# Patient Record
Sex: FEMALE | Race: WHITE | NOT HISPANIC OR LATINO | Employment: FULL TIME | URBAN - METROPOLITAN AREA
[De-identification: names, ages, dates, MRNs, and addresses within clinical notes are randomized per-mention and may not be internally consistent; named-entity substitution may affect disease eponyms.]

---

## 2022-01-01 ENCOUNTER — ANESTHESIA EVENT (OUTPATIENT)
Dept: SURGERY | Facility: MEDICAL CENTER | Age: 67
DRG: 871 | End: 2022-01-01
Payer: COMMERCIAL

## 2022-01-01 ENCOUNTER — APPOINTMENT (OUTPATIENT)
Dept: ONCOLOGY | Facility: MEDICAL CENTER | Age: 67
End: 2022-01-01
Attending: STUDENT IN AN ORGANIZED HEALTH CARE EDUCATION/TRAINING PROGRAM
Payer: MEDICARE

## 2022-01-01 ENCOUNTER — OUTPATIENT INFUSION SERVICES (OUTPATIENT)
Dept: ONCOLOGY | Facility: MEDICAL CENTER | Age: 67
End: 2022-01-01
Attending: INTERNAL MEDICINE
Payer: COMMERCIAL

## 2022-01-01 ENCOUNTER — DOCUMENTATION (OUTPATIENT)
Dept: ONCOLOGY | Facility: MEDICAL CENTER | Age: 67
End: 2022-01-01
Payer: COMMERCIAL

## 2022-01-01 ENCOUNTER — TELEPHONE (OUTPATIENT)
Dept: HEMATOLOGY ONCOLOGY | Facility: MEDICAL CENTER | Age: 67
End: 2022-01-01
Payer: MEDICARE

## 2022-01-01 ENCOUNTER — OUTPATIENT INFUSION SERVICES (OUTPATIENT)
Dept: ONCOLOGY | Facility: MEDICAL CENTER | Age: 67
End: 2022-01-01
Attending: STUDENT IN AN ORGANIZED HEALTH CARE EDUCATION/TRAINING PROGRAM
Payer: COMMERCIAL

## 2022-01-01 ENCOUNTER — PATIENT OUTREACH (OUTPATIENT)
Dept: ONCOLOGY | Facility: MEDICAL CENTER | Age: 67
End: 2022-01-01
Payer: MEDICARE

## 2022-01-01 ENCOUNTER — PHARMACY VISIT (OUTPATIENT)
Dept: PHARMACY | Facility: MEDICAL CENTER | Age: 67
End: 2022-01-01
Payer: COMMERCIAL

## 2022-01-01 ENCOUNTER — HOSPITAL ENCOUNTER (OUTPATIENT)
Facility: MEDICAL CENTER | Age: 67
End: 2022-06-30
Attending: INTERNAL MEDICINE
Payer: COMMERCIAL

## 2022-01-01 ENCOUNTER — OUTPATIENT INFUSION SERVICES (OUTPATIENT)
Dept: ONCOLOGY | Facility: MEDICAL CENTER | Age: 67
End: 2022-01-01
Attending: INTERNAL MEDICINE
Payer: MEDICARE

## 2022-01-01 ENCOUNTER — HOSPITAL ENCOUNTER (OUTPATIENT)
Dept: RADIOLOGY | Facility: MEDICAL CENTER | Age: 67
End: 2022-06-29
Attending: SURGERY
Payer: COMMERCIAL

## 2022-01-01 ENCOUNTER — APPOINTMENT (OUTPATIENT)
Dept: HEMATOLOGY ONCOLOGY | Facility: MEDICAL CENTER | Age: 67
End: 2022-01-01
Payer: COMMERCIAL

## 2022-01-01 ENCOUNTER — PRE-ADMISSION TESTING (OUTPATIENT)
Dept: ADMISSIONS | Facility: MEDICAL CENTER | Age: 67
End: 2022-01-01
Attending: INTERNAL MEDICINE
Payer: COMMERCIAL

## 2022-01-01 ENCOUNTER — HOSPITAL ENCOUNTER (OUTPATIENT)
Dept: HEMATOLOGY ONCOLOGY | Facility: MEDICAL CENTER | Age: 67
End: 2022-07-15
Attending: INTERNAL MEDICINE
Payer: COMMERCIAL

## 2022-01-01 ENCOUNTER — APPOINTMENT (OUTPATIENT)
Dept: RADIOLOGY | Facility: MEDICAL CENTER | Age: 67
DRG: 871 | End: 2022-01-01
Attending: INTERNAL MEDICINE
Payer: COMMERCIAL

## 2022-01-01 ENCOUNTER — HOSPITAL ENCOUNTER (INPATIENT)
Facility: MEDICAL CENTER | Age: 67
LOS: 1 days | DRG: 424 | End: 2022-06-30
Attending: SURGERY | Admitting: SURGERY
Payer: COMMERCIAL

## 2022-01-01 ENCOUNTER — PATIENT OUTREACH (OUTPATIENT)
Dept: OTHER | Facility: MEDICAL CENTER | Age: 67
End: 2022-01-01
Payer: COMMERCIAL

## 2022-01-01 ENCOUNTER — OFFICE VISIT (OUTPATIENT)
Dept: HEMATOLOGY ONCOLOGY | Facility: MEDICAL CENTER | Age: 67
End: 2022-01-01
Payer: COMMERCIAL

## 2022-01-01 ENCOUNTER — HOSPITAL ENCOUNTER (OUTPATIENT)
Dept: RADIOLOGY | Facility: MEDICAL CENTER | Age: 67
End: 2022-10-14
Attending: STUDENT IN AN ORGANIZED HEALTH CARE EDUCATION/TRAINING PROGRAM
Payer: COMMERCIAL

## 2022-01-01 ENCOUNTER — HOSPITAL ENCOUNTER (OUTPATIENT)
Dept: RADIOLOGY | Facility: MEDICAL CENTER | Age: 67
End: 2022-06-14

## 2022-01-01 ENCOUNTER — HOSPITAL ENCOUNTER (OUTPATIENT)
Dept: HEMATOLOGY ONCOLOGY | Facility: MEDICAL CENTER | Age: 67
End: 2022-07-29
Attending: NURSE PRACTITIONER
Payer: COMMERCIAL

## 2022-01-01 ENCOUNTER — HOSPITAL ENCOUNTER (OUTPATIENT)
Dept: HEMATOLOGY ONCOLOGY | Facility: MEDICAL CENTER | Age: 67
End: 2022-08-26
Attending: STUDENT IN AN ORGANIZED HEALTH CARE EDUCATION/TRAINING PROGRAM
Payer: COMMERCIAL

## 2022-01-01 ENCOUNTER — APPOINTMENT (OUTPATIENT)
Dept: RADIOLOGY | Facility: MEDICAL CENTER | Age: 67
DRG: 871 | End: 2022-01-01
Attending: EMERGENCY MEDICINE
Payer: COMMERCIAL

## 2022-01-01 ENCOUNTER — TELEPHONE (OUTPATIENT)
Dept: HEMATOLOGY ONCOLOGY | Facility: MEDICAL CENTER | Age: 67
End: 2022-01-01
Payer: COMMERCIAL

## 2022-01-01 ENCOUNTER — HOSPITAL ENCOUNTER (OUTPATIENT)
Dept: HEMATOLOGY ONCOLOGY | Facility: MEDICAL CENTER | Age: 67
End: 2022-10-19
Attending: STUDENT IN AN ORGANIZED HEALTH CARE EDUCATION/TRAINING PROGRAM
Payer: COMMERCIAL

## 2022-01-01 ENCOUNTER — HOSPITAL ENCOUNTER (OUTPATIENT)
Facility: MEDICAL CENTER | Age: 67
End: 2022-05-20
Attending: INTERNAL MEDICINE | Admitting: INTERNAL MEDICINE
Payer: COMMERCIAL

## 2022-01-01 ENCOUNTER — ANESTHESIA EVENT (OUTPATIENT)
Dept: SURGERY | Facility: MEDICAL CENTER | Age: 67
End: 2022-01-01
Payer: COMMERCIAL

## 2022-01-01 ENCOUNTER — DOCUMENTATION (OUTPATIENT)
Dept: ONCOLOGY | Facility: MEDICAL CENTER | Age: 67
End: 2022-01-01

## 2022-01-01 ENCOUNTER — HOSPITAL ENCOUNTER (INPATIENT)
Facility: MEDICAL CENTER | Age: 67
LOS: 7 days | DRG: 871 | End: 2022-10-28
Attending: EMERGENCY MEDICINE | Admitting: INTERNAL MEDICINE
Payer: COMMERCIAL

## 2022-01-01 ENCOUNTER — TELEPHONE (OUTPATIENT)
Dept: SURGICAL ONCOLOGY | Facility: MEDICAL CENTER | Age: 67
End: 2022-01-01
Payer: COMMERCIAL

## 2022-01-01 ENCOUNTER — APPOINTMENT (OUTPATIENT)
Dept: RADIOLOGY | Facility: MEDICAL CENTER | Age: 67
DRG: 871 | End: 2022-01-01
Attending: NURSE PRACTITIONER
Payer: COMMERCIAL

## 2022-01-01 ENCOUNTER — ANESTHESIA (OUTPATIENT)
Dept: SURGERY | Facility: MEDICAL CENTER | Age: 67
DRG: 871 | End: 2022-01-01
Payer: COMMERCIAL

## 2022-01-01 ENCOUNTER — ANESTHESIA EVENT (OUTPATIENT)
Dept: SURGERY | Facility: MEDICAL CENTER | Age: 67
DRG: 424 | End: 2022-01-01
Payer: COMMERCIAL

## 2022-01-01 ENCOUNTER — APPOINTMENT (OUTPATIENT)
Dept: RADIOLOGY | Facility: MEDICAL CENTER | Age: 67
DRG: 871 | End: 2022-01-01
Attending: HOSPITALIST
Payer: COMMERCIAL

## 2022-01-01 ENCOUNTER — PRE-ADMISSION TESTING (OUTPATIENT)
Dept: ADMISSIONS | Facility: MEDICAL CENTER | Age: 67
DRG: 424 | End: 2022-01-01
Attending: SURGERY
Payer: COMMERCIAL

## 2022-01-01 ENCOUNTER — ANESTHESIA (OUTPATIENT)
Dept: SURGERY | Facility: MEDICAL CENTER | Age: 67
DRG: 424 | End: 2022-01-01
Payer: COMMERCIAL

## 2022-01-01 ENCOUNTER — APPOINTMENT (OUTPATIENT)
Dept: RADIOLOGY | Facility: MEDICAL CENTER | Age: 67
DRG: 424 | End: 2022-01-01
Attending: SURGERY
Payer: COMMERCIAL

## 2022-01-01 ENCOUNTER — OFFICE VISIT (OUTPATIENT)
Dept: SURGICAL ONCOLOGY | Facility: MEDICAL CENTER | Age: 67
End: 2022-01-01
Payer: COMMERCIAL

## 2022-01-01 ENCOUNTER — ANESTHESIA (OUTPATIENT)
Dept: SURGERY | Facility: MEDICAL CENTER | Age: 67
End: 2022-01-01
Payer: COMMERCIAL

## 2022-01-01 ENCOUNTER — PATIENT MESSAGE (OUTPATIENT)
Dept: HEALTH INFORMATION MANAGEMENT | Facility: OTHER | Age: 67
End: 2022-01-01

## 2022-01-01 ENCOUNTER — HOSPITAL ENCOUNTER (OUTPATIENT)
Dept: HEMATOLOGY ONCOLOGY | Facility: MEDICAL CENTER | Age: 67
End: 2022-08-12
Payer: COMMERCIAL

## 2022-01-01 ENCOUNTER — HOSPITAL ENCOUNTER (OUTPATIENT)
Dept: HEMATOLOGY ONCOLOGY | Facility: MEDICAL CENTER | Age: 67
End: 2022-09-23
Attending: NURSE PRACTITIONER
Payer: COMMERCIAL

## 2022-01-01 ENCOUNTER — HOSPITAL ENCOUNTER (OUTPATIENT)
Dept: HEMATOLOGY ONCOLOGY | Facility: MEDICAL CENTER | Age: 67
End: 2022-09-09
Attending: STUDENT IN AN ORGANIZED HEALTH CARE EDUCATION/TRAINING PROGRAM
Payer: COMMERCIAL

## 2022-01-01 VITALS
BODY MASS INDEX: 21.87 KG/M2 | DIASTOLIC BLOOD PRESSURE: 92 MMHG | WEIGHT: 108.47 LBS | TEMPERATURE: 96.7 F | HEIGHT: 59 IN | RESPIRATION RATE: 18 BRPM | HEART RATE: 102 BPM | OXYGEN SATURATION: 95 % | SYSTOLIC BLOOD PRESSURE: 132 MMHG

## 2022-01-01 VITALS
RESPIRATION RATE: 18 BRPM | BODY MASS INDEX: 20.18 KG/M2 | DIASTOLIC BLOOD PRESSURE: 95 MMHG | HEIGHT: 59 IN | SYSTOLIC BLOOD PRESSURE: 148 MMHG | TEMPERATURE: 97.1 F | HEART RATE: 106 BPM | OXYGEN SATURATION: 97 % | WEIGHT: 100.09 LBS

## 2022-01-01 VITALS
RESPIRATION RATE: 18 BRPM | WEIGHT: 102.29 LBS | SYSTOLIC BLOOD PRESSURE: 124 MMHG | BODY MASS INDEX: 20.62 KG/M2 | TEMPERATURE: 96.8 F | DIASTOLIC BLOOD PRESSURE: 89 MMHG | OXYGEN SATURATION: 97 % | HEART RATE: 101 BPM | HEIGHT: 59 IN

## 2022-01-01 VITALS
WEIGHT: 124.01 LBS | HEART RATE: 71 BPM | DIASTOLIC BLOOD PRESSURE: 103 MMHG | TEMPERATURE: 97.2 F | BODY MASS INDEX: 25 KG/M2 | RESPIRATION RATE: 18 BRPM | OXYGEN SATURATION: 93 % | SYSTOLIC BLOOD PRESSURE: 154 MMHG | HEIGHT: 59 IN

## 2022-01-01 VITALS
TEMPERATURE: 98.5 F | WEIGHT: 128.53 LBS | BODY MASS INDEX: 26.98 KG/M2 | HEART RATE: 87 BPM | HEIGHT: 58 IN | DIASTOLIC BLOOD PRESSURE: 38 MMHG | RESPIRATION RATE: 18 BRPM | SYSTOLIC BLOOD PRESSURE: 63 MMHG | OXYGEN SATURATION: 92 %

## 2022-01-01 VITALS
RESPIRATION RATE: 16 BRPM | WEIGHT: 98.11 LBS | TEMPERATURE: 96.9 F | HEIGHT: 58 IN | BODY MASS INDEX: 20.59 KG/M2 | OXYGEN SATURATION: 96 % | SYSTOLIC BLOOD PRESSURE: 122 MMHG | HEART RATE: 104 BPM | DIASTOLIC BLOOD PRESSURE: 84 MMHG

## 2022-01-01 VITALS
HEART RATE: 93 BPM | WEIGHT: 113.2 LBS | SYSTOLIC BLOOD PRESSURE: 118 MMHG | DIASTOLIC BLOOD PRESSURE: 76 MMHG | OXYGEN SATURATION: 97 % | TEMPERATURE: 97.3 F | RESPIRATION RATE: 14 BRPM | BODY MASS INDEX: 22.82 KG/M2 | HEIGHT: 59 IN

## 2022-01-01 VITALS
OXYGEN SATURATION: 96 % | TEMPERATURE: 97.4 F | HEART RATE: 107 BPM | SYSTOLIC BLOOD PRESSURE: 128 MMHG | RESPIRATION RATE: 15 BRPM | WEIGHT: 104.72 LBS | DIASTOLIC BLOOD PRESSURE: 96 MMHG | HEIGHT: 59 IN | BODY MASS INDEX: 21.11 KG/M2

## 2022-01-01 VITALS
HEART RATE: 80 BPM | DIASTOLIC BLOOD PRESSURE: 97 MMHG | OXYGEN SATURATION: 94 % | RESPIRATION RATE: 18 BRPM | SYSTOLIC BLOOD PRESSURE: 143 MMHG | TEMPERATURE: 98 F

## 2022-01-01 VITALS
SYSTOLIC BLOOD PRESSURE: 108 MMHG | BODY MASS INDEX: 20.68 KG/M2 | TEMPERATURE: 97.6 F | DIASTOLIC BLOOD PRESSURE: 62 MMHG | HEART RATE: 104 BPM | OXYGEN SATURATION: 92 % | WEIGHT: 102.6 LBS | RESPIRATION RATE: 16 BRPM | HEIGHT: 59 IN

## 2022-01-01 VITALS
RESPIRATION RATE: 18 BRPM | BODY MASS INDEX: 22.8 KG/M2 | DIASTOLIC BLOOD PRESSURE: 95 MMHG | OXYGEN SATURATION: 97 % | WEIGHT: 113.1 LBS | TEMPERATURE: 96.3 F | HEART RATE: 82 BPM | HEIGHT: 59 IN | SYSTOLIC BLOOD PRESSURE: 131 MMHG

## 2022-01-01 VITALS
OXYGEN SATURATION: 97 % | WEIGHT: 124 LBS | HEART RATE: 93 BPM | DIASTOLIC BLOOD PRESSURE: 80 MMHG | BODY MASS INDEX: 26.03 KG/M2 | HEIGHT: 58 IN | SYSTOLIC BLOOD PRESSURE: 118 MMHG | TEMPERATURE: 97.3 F

## 2022-01-01 VITALS
SYSTOLIC BLOOD PRESSURE: 133 MMHG | DIASTOLIC BLOOD PRESSURE: 89 MMHG | HEART RATE: 81 BPM | TEMPERATURE: 97 F | WEIGHT: 102.51 LBS | BODY MASS INDEX: 21.52 KG/M2 | RESPIRATION RATE: 18 BRPM | HEIGHT: 58 IN | OXYGEN SATURATION: 96 %

## 2022-01-01 VITALS
TEMPERATURE: 97.5 F | DIASTOLIC BLOOD PRESSURE: 66 MMHG | SYSTOLIC BLOOD PRESSURE: 110 MMHG | WEIGHT: 128.97 LBS | HEIGHT: 58 IN | BODY MASS INDEX: 27.07 KG/M2 | OXYGEN SATURATION: 95 % | HEART RATE: 95 BPM | RESPIRATION RATE: 16 BRPM

## 2022-01-01 VITALS
BODY MASS INDEX: 21.09 KG/M2 | SYSTOLIC BLOOD PRESSURE: 110 MMHG | WEIGHT: 104.6 LBS | OXYGEN SATURATION: 97 % | RESPIRATION RATE: 16 BRPM | HEART RATE: 100 BPM | TEMPERATURE: 96.6 F | DIASTOLIC BLOOD PRESSURE: 52 MMHG | HEIGHT: 59 IN

## 2022-01-01 VITALS
TEMPERATURE: 97 F | HEIGHT: 58 IN | OXYGEN SATURATION: 96 % | HEART RATE: 110 BPM | WEIGHT: 98.4 LBS | SYSTOLIC BLOOD PRESSURE: 98 MMHG | BODY MASS INDEX: 20.65 KG/M2 | DIASTOLIC BLOOD PRESSURE: 60 MMHG | RESPIRATION RATE: 14 BRPM

## 2022-01-01 VITALS
SYSTOLIC BLOOD PRESSURE: 139 MMHG | HEART RATE: 99 BPM | DIASTOLIC BLOOD PRESSURE: 87 MMHG | OXYGEN SATURATION: 95 % | TEMPERATURE: 97.1 F | BODY MASS INDEX: 23.78 KG/M2 | WEIGHT: 117.95 LBS | HEIGHT: 59 IN | RESPIRATION RATE: 18 BRPM

## 2022-01-01 VITALS
RESPIRATION RATE: 18 BRPM | SYSTOLIC BLOOD PRESSURE: 130 MMHG | HEART RATE: 112 BPM | OXYGEN SATURATION: 100 % | DIASTOLIC BLOOD PRESSURE: 94 MMHG | TEMPERATURE: 97.1 F

## 2022-01-01 VITALS
HEART RATE: 101 BPM | WEIGHT: 103 LBS | OXYGEN SATURATION: 96 % | TEMPERATURE: 97.4 F | DIASTOLIC BLOOD PRESSURE: 60 MMHG | RESPIRATION RATE: 14 BRPM | BODY MASS INDEX: 21.62 KG/M2 | HEIGHT: 58 IN | SYSTOLIC BLOOD PRESSURE: 112 MMHG

## 2022-01-01 VITALS
OXYGEN SATURATION: 98 % | RESPIRATION RATE: 18 BRPM | TEMPERATURE: 98 F | DIASTOLIC BLOOD PRESSURE: 92 MMHG | SYSTOLIC BLOOD PRESSURE: 125 MMHG | HEART RATE: 133 BPM

## 2022-01-01 VITALS
HEIGHT: 58 IN | SYSTOLIC BLOOD PRESSURE: 144 MMHG | BODY MASS INDEX: 25.59 KG/M2 | RESPIRATION RATE: 16 BRPM | DIASTOLIC BLOOD PRESSURE: 87 MMHG | HEART RATE: 68 BPM | WEIGHT: 121.91 LBS | OXYGEN SATURATION: 96 % | TEMPERATURE: 97.6 F

## 2022-01-01 VITALS
TEMPERATURE: 97.6 F | SYSTOLIC BLOOD PRESSURE: 141 MMHG | HEART RATE: 109 BPM | BODY MASS INDEX: 23.78 KG/M2 | OXYGEN SATURATION: 98 % | RESPIRATION RATE: 18 BRPM | WEIGHT: 117.95 LBS | DIASTOLIC BLOOD PRESSURE: 108 MMHG

## 2022-01-01 VITALS
BODY MASS INDEX: 21.89 KG/M2 | RESPIRATION RATE: 16 BRPM | SYSTOLIC BLOOD PRESSURE: 122 MMHG | TEMPERATURE: 96.9 F | HEART RATE: 96 BPM | DIASTOLIC BLOOD PRESSURE: 82 MMHG | HEIGHT: 59 IN | OXYGEN SATURATION: 97 % | WEIGHT: 108.6 LBS

## 2022-01-01 DIAGNOSIS — C25.9 MALIGNANT NEOPLASM DETERMINED BY BIOPSY OF PANCREAS (HCC): ICD-10-CM

## 2022-01-01 DIAGNOSIS — C25.9 PANCREATIC ADENOCARCINOMA (HCC): ICD-10-CM

## 2022-01-01 DIAGNOSIS — C25.8 OVERLAPPING MALIGNANT NEOPLASM OF PANCREAS (HCC): ICD-10-CM

## 2022-01-01 DIAGNOSIS — R93.5 ABDOMINAL ULTRASOUND, ABNORMAL: ICD-10-CM

## 2022-01-01 DIAGNOSIS — Z79.899 ENCOUNTER FOR LONG-TERM CURRENT USE OF HIGH RISK MEDICATION: ICD-10-CM

## 2022-01-01 DIAGNOSIS — C25.9 PANCREATIC CARCINOMA (HCC): ICD-10-CM

## 2022-01-01 DIAGNOSIS — R11.2 CHEMOTHERAPY INDUCED NAUSEA AND VOMITING: ICD-10-CM

## 2022-01-01 DIAGNOSIS — R63.4 WEIGHT LOSS: ICD-10-CM

## 2022-01-01 DIAGNOSIS — Z01.810 PRE-OPERATIVE CARDIOVASCULAR EXAMINATION: ICD-10-CM

## 2022-01-01 DIAGNOSIS — Z95.828 PORT-A-CATH IN PLACE: ICD-10-CM

## 2022-01-01 DIAGNOSIS — C78.7 LIVER METASTASES: ICD-10-CM

## 2022-01-01 DIAGNOSIS — R10.10 PAIN OF UPPER ABDOMEN: ICD-10-CM

## 2022-01-01 DIAGNOSIS — C25.9 PANCREATIC CARCINOMA (HCC): Primary | ICD-10-CM

## 2022-01-01 DIAGNOSIS — Z85.89 HISTORY OF KNOWN METASTASIS TO LIVER: ICD-10-CM

## 2022-01-01 DIAGNOSIS — K52.1 CHEMOTHERAPY INDUCED DIARRHEA: ICD-10-CM

## 2022-01-01 DIAGNOSIS — Z01.812 PRE-OPERATIVE LABORATORY EXAMINATION: ICD-10-CM

## 2022-01-01 DIAGNOSIS — T45.1X5A CHEMOTHERAPY INDUCED DIARRHEA: ICD-10-CM

## 2022-01-01 DIAGNOSIS — T45.1X5A CHEMOTHERAPY INDUCED NAUSEA AND VOMITING: ICD-10-CM

## 2022-01-01 DIAGNOSIS — K83.1 BILIARY OBSTRUCTION: ICD-10-CM

## 2022-01-01 LAB
ABO + RH BLD: NORMAL
ABO GROUP BLD: NORMAL
ALBUMIN SERPL BCP-MCNC: 2 G/DL (ref 3.2–4.9)
ALBUMIN SERPL BCP-MCNC: 2.1 G/DL (ref 3.2–4.9)
ALBUMIN SERPL BCP-MCNC: 2.3 G/DL (ref 3.2–4.9)
ALBUMIN SERPL BCP-MCNC: 2.5 G/DL (ref 3.2–4.9)
ALBUMIN SERPL BCP-MCNC: 2.6 G/DL (ref 3.2–4.9)
ALBUMIN SERPL BCP-MCNC: 2.9 G/DL (ref 3.2–4.9)
ALBUMIN SERPL BCP-MCNC: 3 G/DL (ref 3.2–4.9)
ALBUMIN SERPL BCP-MCNC: 3.6 G/DL (ref 3.2–4.9)
ALBUMIN SERPL BCP-MCNC: 3.8 G/DL (ref 3.2–4.9)
ALBUMIN SERPL BCP-MCNC: 3.9 G/DL (ref 3.2–4.9)
ALBUMIN SERPL BCP-MCNC: 4.3 G/DL (ref 3.2–4.9)
ALBUMIN SERPL BCP-MCNC: 4.4 G/DL (ref 3.2–4.9)
ALBUMIN/GLOB SERPL: 0.6 G/DL
ALBUMIN/GLOB SERPL: 0.7 G/DL
ALBUMIN/GLOB SERPL: 0.8 G/DL
ALBUMIN/GLOB SERPL: 0.9 G/DL
ALBUMIN/GLOB SERPL: 0.9 G/DL
ALBUMIN/GLOB SERPL: 1 G/DL
ALBUMIN/GLOB SERPL: 1.2 G/DL
ALBUMIN/GLOB SERPL: 1.2 G/DL
ALBUMIN/GLOB SERPL: 1.3 G/DL
ALP SERPL-CCNC: 137 U/L (ref 30–99)
ALP SERPL-CCNC: 209 U/L (ref 30–99)
ALP SERPL-CCNC: 306 U/L (ref 30–99)
ALP SERPL-CCNC: 420 U/L (ref 30–99)
ALP SERPL-CCNC: 475 U/L (ref 30–99)
ALP SERPL-CCNC: 477 U/L (ref 30–99)
ALP SERPL-CCNC: 521 U/L (ref 30–99)
ALP SERPL-CCNC: 600 U/L (ref 30–99)
ALP SERPL-CCNC: 623 U/L (ref 30–99)
ALP SERPL-CCNC: 677 U/L (ref 30–99)
ALP SERPL-CCNC: 696 U/L (ref 30–99)
ALP SERPL-CCNC: 698 U/L (ref 30–99)
ALP SERPL-CCNC: 764 U/L (ref 30–99)
ALP SERPL-CCNC: 812 U/L (ref 30–99)
ALT SERPL-CCNC: 30 U/L (ref 2–50)
ALT SERPL-CCNC: 36 U/L (ref 2–50)
ALT SERPL-CCNC: 44 U/L (ref 2–50)
ALT SERPL-CCNC: 47 U/L (ref 2–50)
ALT SERPL-CCNC: 47 U/L (ref 2–50)
ALT SERPL-CCNC: 49 U/L (ref 2–50)
ALT SERPL-CCNC: 51 U/L (ref 2–50)
ALT SERPL-CCNC: 53 U/L (ref 2–50)
ALT SERPL-CCNC: 54 U/L (ref 2–50)
ALT SERPL-CCNC: 55 U/L (ref 2–50)
ALT SERPL-CCNC: 56 U/L (ref 2–50)
ALT SERPL-CCNC: 58 U/L (ref 2–50)
ALT SERPL-CCNC: 59 U/L (ref 2–50)
ALT SERPL-CCNC: 62 U/L (ref 2–50)
ANION GAP SERPL CALC-SCNC: 13 MMOL/L (ref 7–16)
ANION GAP SERPL CALC-SCNC: 13 MMOL/L (ref 7–16)
ANION GAP SERPL CALC-SCNC: 16 MMOL/L (ref 7–16)
ANION GAP SERPL CALC-SCNC: 17 MMOL/L (ref 7–16)
ANION GAP SERPL CALC-SCNC: 18 MMOL/L (ref 7–16)
ANION GAP SERPL CALC-SCNC: 18 MMOL/L (ref 7–16)
ANION GAP SERPL CALC-SCNC: 19 MMOL/L (ref 7–16)
ANISOCYTOSIS BLD QL SMEAR: ABNORMAL
ANISOCYTOSIS BLD QL SMEAR: ABNORMAL
AST SERPL-CCNC: 100 U/L (ref 12–45)
AST SERPL-CCNC: 122 U/L (ref 12–45)
AST SERPL-CCNC: 124 U/L (ref 12–45)
AST SERPL-CCNC: 142 U/L (ref 12–45)
AST SERPL-CCNC: 145 U/L (ref 12–45)
AST SERPL-CCNC: 150 U/L (ref 12–45)
AST SERPL-CCNC: 155 U/L (ref 12–45)
AST SERPL-CCNC: 167 U/L (ref 12–45)
AST SERPL-CCNC: 43 U/L (ref 12–45)
AST SERPL-CCNC: 47 U/L (ref 12–45)
AST SERPL-CCNC: 72 U/L (ref 12–45)
AST SERPL-CCNC: 78 U/L (ref 12–45)
AST SERPL-CCNC: 81 U/L (ref 12–45)
AST SERPL-CCNC: 85 U/L (ref 12–45)
BACTERIA BLD CULT: NORMAL
BACTERIA BLD CULT: NORMAL
BASOPHILS # BLD AUTO: 0 % (ref 0–1.8)
BASOPHILS # BLD AUTO: 0.2 % (ref 0–1.8)
BASOPHILS # BLD AUTO: 0.2 % (ref 0–1.8)
BASOPHILS # BLD AUTO: 0.3 % (ref 0–1.8)
BASOPHILS # BLD AUTO: 0.3 % (ref 0–1.8)
BASOPHILS # BLD AUTO: 0.5 % (ref 0–1.8)
BASOPHILS # BLD AUTO: 0.5 % (ref 0–1.8)
BASOPHILS # BLD AUTO: 0.6 % (ref 0–1.8)
BASOPHILS # BLD AUTO: 0.6 % (ref 0–1.8)
BASOPHILS # BLD AUTO: 0.9 % (ref 0–1.8)
BASOPHILS # BLD: 0 K/UL (ref 0–0.12)
BASOPHILS # BLD: 0.01 K/UL (ref 0–0.12)
BASOPHILS # BLD: 0.04 K/UL (ref 0–0.12)
BASOPHILS # BLD: 0.05 K/UL (ref 0–0.12)
BASOPHILS # BLD: 0.06 K/UL (ref 0–0.12)
BASOPHILS # BLD: 0.07 K/UL (ref 0–0.12)
BASOPHILS # BLD: 0.07 K/UL (ref 0–0.12)
BASOPHILS # BLD: 0.08 K/UL (ref 0–0.12)
BASOPHILS # BLD: 0.1 K/UL (ref 0–0.12)
BASOPHILS # BLD: 0.15 K/UL (ref 0–0.12)
BILIRUB CONJ SERPL-MCNC: 7.9 MG/DL (ref 0.1–0.5)
BILIRUB INDIRECT SERPL-MCNC: 0.9 MG/DL (ref 0–1)
BILIRUB SERPL-MCNC: 0.7 MG/DL (ref 0.1–1.5)
BILIRUB SERPL-MCNC: 0.7 MG/DL (ref 0.1–1.5)
BILIRUB SERPL-MCNC: 0.8 MG/DL (ref 0.1–1.5)
BILIRUB SERPL-MCNC: 0.8 MG/DL (ref 0.1–1.5)
BILIRUB SERPL-MCNC: 0.9 MG/DL (ref 0.1–1.5)
BILIRUB SERPL-MCNC: 1.2 MG/DL (ref 0.1–1.5)
BILIRUB SERPL-MCNC: 1.5 MG/DL (ref 0.1–1.5)
BILIRUB SERPL-MCNC: 10 MG/DL (ref 0.1–1.5)
BILIRUB SERPL-MCNC: 10.1 MG/DL (ref 0.1–1.5)
BILIRUB SERPL-MCNC: 10.2 MG/DL (ref 0.1–1.5)
BILIRUB SERPL-MCNC: 8 MG/DL (ref 0.1–1.5)
BILIRUB SERPL-MCNC: 8.7 MG/DL (ref 0.1–1.5)
BILIRUB SERPL-MCNC: 8.8 MG/DL (ref 0.1–1.5)
BILIRUB SERPL-MCNC: 9.1 MG/DL (ref 0.1–1.5)
BLD GP AB SCN SERPL QL: NORMAL
BUN SERPL-MCNC: 10 MG/DL (ref 8–22)
BUN SERPL-MCNC: 14 MG/DL (ref 8–22)
BUN SERPL-MCNC: 16 MG/DL (ref 8–22)
BUN SERPL-MCNC: 18 MG/DL (ref 8–22)
BUN SERPL-MCNC: 18 MG/DL (ref 8–22)
BUN SERPL-MCNC: 19 MG/DL (ref 8–22)
BUN SERPL-MCNC: 22 MG/DL (ref 8–22)
BUN SERPL-MCNC: 24 MG/DL (ref 8–22)
BUN SERPL-MCNC: 31 MG/DL (ref 8–22)
BUN SERPL-MCNC: 37 MG/DL (ref 8–22)
BUN SERPL-MCNC: 7 MG/DL (ref 8–22)
BUN SERPL-MCNC: 9 MG/DL (ref 8–22)
BURR CELLS BLD QL SMEAR: NORMAL
CALCIUM SERPL-MCNC: 8 MG/DL (ref 8.4–10.2)
CALCIUM SERPL-MCNC: 8.2 MG/DL (ref 8.4–10.2)
CALCIUM SERPL-MCNC: 8.5 MG/DL (ref 8.4–10.2)
CALCIUM SERPL-MCNC: 8.8 MG/DL (ref 8.4–10.2)
CALCIUM SERPL-MCNC: 8.8 MG/DL (ref 8.5–10.5)
CALCIUM SERPL-MCNC: 8.8 MG/DL (ref 8.5–10.5)
CALCIUM SERPL-MCNC: 9 MG/DL (ref 8.5–10.5)
CALCIUM SERPL-MCNC: 9.3 MG/DL (ref 8.5–10.5)
CALCIUM SERPL-MCNC: 9.6 MG/DL (ref 8.5–10.5)
CALCIUM SERPL-MCNC: 9.6 MG/DL (ref 8.5–10.5)
CALCIUM SERPL-MCNC: 9.7 MG/DL (ref 8.5–10.5)
CALCIUM SERPL-MCNC: 9.9 MG/DL (ref 8.5–10.5)
CANCER AG19-9 SERPL-ACNC: 228 U/ML (ref 0–35)
CANCER AG19-9 SERPL-ACNC: 284 U/ML (ref 0–35)
CANCER AG19-9 SERPL-ACNC: 312 U/ML (ref 0–35)
CANCER AG19-9 SERPL-ACNC: 418 U/ML (ref 0–35)
CANCER AG19-9 SERPL-ACNC: 476 U/ML (ref 0–35)
CANCER AG19-9 SERPL-ACNC: 513 U/ML (ref 0–35)
CANCER AG19-9 SERPL-ACNC: 575 U/ML (ref 0–35)
CEA SERPL-MCNC: 2172 NG/ML (ref 0–3)
CEA SERPL-MCNC: 3117 NG/ML (ref 0–3)
CEA SERPL-MCNC: 3641 NG/ML (ref 0–3)
CEA SERPL-MCNC: 3821 NG/ML (ref 0–3)
CEA SERPL-MCNC: 4190 NG/ML (ref 0–3)
CEA SERPL-MCNC: 5576 NG/ML (ref 0–3)
CHLORIDE SERPL-SCNC: 100 MMOL/L (ref 96–112)
CHLORIDE SERPL-SCNC: 87 MMOL/L (ref 96–112)
CHLORIDE SERPL-SCNC: 89 MMOL/L (ref 96–112)
CHLORIDE SERPL-SCNC: 89 MMOL/L (ref 96–112)
CHLORIDE SERPL-SCNC: 93 MMOL/L (ref 96–112)
CHLORIDE SERPL-SCNC: 93 MMOL/L (ref 96–112)
CHLORIDE SERPL-SCNC: 95 MMOL/L (ref 96–112)
CHLORIDE SERPL-SCNC: 96 MMOL/L (ref 96–112)
CHLORIDE SERPL-SCNC: 97 MMOL/L (ref 96–112)
CHLORIDE SERPL-SCNC: 97 MMOL/L (ref 96–112)
CHLORIDE SERPL-SCNC: 98 MMOL/L (ref 96–112)
CHLORIDE SERPL-SCNC: 99 MMOL/L (ref 96–112)
CO2 SERPL-SCNC: 13 MMOL/L (ref 20–33)
CO2 SERPL-SCNC: 14 MMOL/L (ref 20–33)
CO2 SERPL-SCNC: 15 MMOL/L (ref 20–33)
CO2 SERPL-SCNC: 16 MMOL/L (ref 20–33)
CO2 SERPL-SCNC: 20 MMOL/L (ref 20–33)
CO2 SERPL-SCNC: 21 MMOL/L (ref 20–33)
CO2 SERPL-SCNC: 22 MMOL/L (ref 20–33)
CO2 SERPL-SCNC: 22 MMOL/L (ref 20–33)
CO2 SERPL-SCNC: 24 MMOL/L (ref 20–33)
CO2 SERPL-SCNC: 25 MMOL/L (ref 20–33)
CO2 SERPL-SCNC: 25 MMOL/L (ref 20–33)
CO2 SERPL-SCNC: 26 MMOL/L (ref 20–33)
COMMENT 1642: NORMAL
CREAT SERPL-MCNC: 0.39 MG/DL (ref 0.5–1.4)
CREAT SERPL-MCNC: 0.47 MG/DL (ref 0.5–1.4)
CREAT SERPL-MCNC: 0.49 MG/DL (ref 0.5–1.4)
CREAT SERPL-MCNC: 0.51 MG/DL (ref 0.5–1.4)
CREAT SERPL-MCNC: 0.53 MG/DL (ref 0.5–1.4)
CREAT SERPL-MCNC: 0.53 MG/DL (ref 0.5–1.4)
CREAT SERPL-MCNC: 0.54 MG/DL (ref 0.5–1.4)
CREAT SERPL-MCNC: 0.6 MG/DL (ref 0.5–1.4)
CREAT SERPL-MCNC: 0.64 MG/DL (ref 0.5–1.4)
CREAT SERPL-MCNC: 0.64 MG/DL (ref 0.5–1.4)
CREAT SERPL-MCNC: 0.68 MG/DL (ref 0.5–1.4)
CREAT SERPL-MCNC: 0.76 MG/DL (ref 0.5–1.4)
CREAT SERPL-MCNC: 0.95 MG/DL (ref 0.5–1.4)
CREAT SERPL-MCNC: 1.19 MG/DL (ref 0.5–1.4)
EKG IMPRESSION: NORMAL
EOSINOPHIL # BLD AUTO: 0 K/UL (ref 0–0.51)
EOSINOPHIL # BLD AUTO: 0 K/UL (ref 0–0.51)
EOSINOPHIL # BLD AUTO: 0.01 K/UL (ref 0–0.51)
EOSINOPHIL # BLD AUTO: 0.04 K/UL (ref 0–0.51)
EOSINOPHIL # BLD AUTO: 0.11 K/UL (ref 0–0.51)
EOSINOPHIL # BLD AUTO: 0.13 K/UL (ref 0–0.51)
EOSINOPHIL # BLD AUTO: 0.16 K/UL (ref 0–0.51)
EOSINOPHIL # BLD AUTO: 0.19 K/UL (ref 0–0.51)
EOSINOPHIL # BLD AUTO: 0.25 K/UL (ref 0–0.51)
EOSINOPHIL # BLD AUTO: 0.27 K/UL (ref 0–0.51)
EOSINOPHIL NFR BLD: 0 % (ref 0–6.9)
EOSINOPHIL NFR BLD: 0 % (ref 0–6.9)
EOSINOPHIL NFR BLD: 0.2 % (ref 0–6.9)
EOSINOPHIL NFR BLD: 0.4 % (ref 0–6.9)
EOSINOPHIL NFR BLD: 0.5 % (ref 0–6.9)
EOSINOPHIL NFR BLD: 0.6 % (ref 0–6.9)
EOSINOPHIL NFR BLD: 0.8 % (ref 0–6.9)
EOSINOPHIL NFR BLD: 1 % (ref 0–6.9)
EOSINOPHIL NFR BLD: 1.5 % (ref 0–6.9)
EOSINOPHIL NFR BLD: 3 % (ref 0–6.9)
ERYTHROCYTE [DISTWIDTH] IN BLOOD BY AUTOMATED COUNT: 47.5 FL (ref 35.9–50)
ERYTHROCYTE [DISTWIDTH] IN BLOOD BY AUTOMATED COUNT: 47.7 FL (ref 35.9–50)
ERYTHROCYTE [DISTWIDTH] IN BLOOD BY AUTOMATED COUNT: 51.8 FL (ref 35.9–50)
ERYTHROCYTE [DISTWIDTH] IN BLOOD BY AUTOMATED COUNT: 57.4 FL (ref 35.9–50)
ERYTHROCYTE [DISTWIDTH] IN BLOOD BY AUTOMATED COUNT: 59 FL (ref 35.9–50)
ERYTHROCYTE [DISTWIDTH] IN BLOOD BY AUTOMATED COUNT: 61.2 FL (ref 35.9–50)
ERYTHROCYTE [DISTWIDTH] IN BLOOD BY AUTOMATED COUNT: 69.4 FL (ref 35.9–50)
ERYTHROCYTE [DISTWIDTH] IN BLOOD BY AUTOMATED COUNT: 86.6 FL (ref 35.9–50)
ERYTHROCYTE [DISTWIDTH] IN BLOOD BY AUTOMATED COUNT: 87.7 FL (ref 35.9–50)
ERYTHROCYTE [DISTWIDTH] IN BLOOD BY AUTOMATED COUNT: 92.8 FL (ref 35.9–50)
ERYTHROCYTE [DISTWIDTH] IN BLOOD BY AUTOMATED COUNT: 93.9 FL (ref 35.9–50)
ERYTHROCYTE [DISTWIDTH] IN BLOOD BY AUTOMATED COUNT: 94 FL (ref 35.9–50)
ERYTHROCYTE [DISTWIDTH] IN BLOOD BY AUTOMATED COUNT: 94.4 FL (ref 35.9–50)
ERYTHROCYTE [DISTWIDTH] IN BLOOD BY AUTOMATED COUNT: 94.6 FL (ref 35.9–50)
GFR SERPLBLD CREATININE-BSD FMLA CKD-EPI: 101 ML/MIN/1.73 M 2
GFR SERPLBLD CREATININE-BSD FMLA CKD-EPI: 102 ML/MIN/1.73 M 2
GFR SERPLBLD CREATININE-BSD FMLA CKD-EPI: 103 ML/MIN/1.73 M 2
GFR SERPLBLD CREATININE-BSD FMLA CKD-EPI: 104 ML/MIN/1.73 M 2
GFR SERPLBLD CREATININE-BSD FMLA CKD-EPI: 109 ML/MIN/1.73 M 2
GFR SERPLBLD CREATININE-BSD FMLA CKD-EPI: 50 ML/MIN/1.73 M 2
GFR SERPLBLD CREATININE-BSD FMLA CKD-EPI: 66 ML/MIN/1.73 M 2
GFR SERPLBLD CREATININE-BSD FMLA CKD-EPI: 86 ML/MIN/1.73 M 2
GFR SERPLBLD CREATININE-BSD FMLA CKD-EPI: 96 ML/MIN/1.73 M 2
GFR SERPLBLD CREATININE-BSD FMLA CKD-EPI: 97 ML/MIN/1.73 M 2
GFR SERPLBLD CREATININE-BSD FMLA CKD-EPI: 97 ML/MIN/1.73 M 2
GFR SERPLBLD CREATININE-BSD FMLA CKD-EPI: 98 ML/MIN/1.73 M 2
GLOBULIN SER CALC-MCNC: 2.7 G/DL (ref 1.9–3.5)
GLOBULIN SER CALC-MCNC: 3 G/DL (ref 1.9–3.5)
GLOBULIN SER CALC-MCNC: 3.1 G/DL (ref 1.9–3.5)
GLOBULIN SER CALC-MCNC: 3.2 G/DL (ref 1.9–3.5)
GLOBULIN SER CALC-MCNC: 3.3 G/DL (ref 1.9–3.5)
GLOBULIN SER CALC-MCNC: 3.4 G/DL (ref 1.9–3.5)
GLUCOSE SERPL-MCNC: 105 MG/DL (ref 65–99)
GLUCOSE SERPL-MCNC: 108 MG/DL (ref 65–99)
GLUCOSE SERPL-MCNC: 136 MG/DL (ref 65–99)
GLUCOSE SERPL-MCNC: 143 MG/DL (ref 65–99)
GLUCOSE SERPL-MCNC: 147 MG/DL (ref 65–99)
GLUCOSE SERPL-MCNC: 150 MG/DL (ref 65–99)
GLUCOSE SERPL-MCNC: 153 MG/DL (ref 65–99)
GLUCOSE SERPL-MCNC: 163 MG/DL (ref 65–99)
GLUCOSE SERPL-MCNC: 165 MG/DL (ref 65–99)
GLUCOSE SERPL-MCNC: 67 MG/DL (ref 65–99)
GLUCOSE SERPL-MCNC: 74 MG/DL (ref 65–99)
GLUCOSE SERPL-MCNC: 78 MG/DL (ref 65–99)
GLUCOSE SERPL-MCNC: 78 MG/DL (ref 65–99)
GLUCOSE SERPL-MCNC: 92 MG/DL (ref 65–99)
HCT VFR BLD AUTO: 33.8 % (ref 37–47)
HCT VFR BLD AUTO: 35.9 % (ref 37–47)
HCT VFR BLD AUTO: 37.3 % (ref 37–47)
HCT VFR BLD AUTO: 38 % (ref 37–47)
HCT VFR BLD AUTO: 38.1 % (ref 37–47)
HCT VFR BLD AUTO: 38.2 % (ref 37–47)
HCT VFR BLD AUTO: 38.4 % (ref 37–47)
HCT VFR BLD AUTO: 39.4 % (ref 37–47)
HCT VFR BLD AUTO: 41.4 % (ref 37–47)
HCT VFR BLD AUTO: 41.9 % (ref 37–47)
HCT VFR BLD AUTO: 42.5 % (ref 37–47)
HCT VFR BLD AUTO: 42.8 % (ref 37–47)
HGB BLD-MCNC: 11.4 G/DL (ref 12–16)
HGB BLD-MCNC: 11.7 G/DL (ref 12–16)
HGB BLD-MCNC: 12.1 G/DL (ref 12–16)
HGB BLD-MCNC: 12.6 G/DL (ref 12–16)
HGB BLD-MCNC: 12.7 G/DL (ref 12–16)
HGB BLD-MCNC: 12.8 G/DL (ref 12–16)
HGB BLD-MCNC: 13 G/DL (ref 12–16)
HGB BLD-MCNC: 13 G/DL (ref 12–16)
HGB BLD-MCNC: 13.6 G/DL (ref 12–16)
HGB BLD-MCNC: 13.8 G/DL (ref 12–16)
HGB BLD-MCNC: 14.5 G/DL (ref 12–16)
HGB BLD-MCNC: 14.6 G/DL (ref 12–16)
IMM GRANULOCYTES # BLD AUTO: 0.01 K/UL (ref 0–0.11)
IMM GRANULOCYTES # BLD AUTO: 0.03 K/UL (ref 0–0.11)
IMM GRANULOCYTES # BLD AUTO: 0.04 K/UL (ref 0–0.11)
IMM GRANULOCYTES # BLD AUTO: 0.09 K/UL (ref 0–0.11)
IMM GRANULOCYTES # BLD AUTO: 0.14 K/UL (ref 0–0.11)
IMM GRANULOCYTES # BLD AUTO: 0.3 K/UL (ref 0–0.11)
IMM GRANULOCYTES # BLD AUTO: 0.51 K/UL (ref 0–0.11)
IMM GRANULOCYTES # BLD AUTO: 0.79 K/UL (ref 0–0.11)
IMM GRANULOCYTES # BLD AUTO: 2.12 K/UL (ref 0–0.11)
IMM GRANULOCYTES NFR BLD AUTO: 0.4 % (ref 0–0.9)
IMM GRANULOCYTES NFR BLD AUTO: 0.4 % (ref 0–0.9)
IMM GRANULOCYTES NFR BLD AUTO: 0.5 % (ref 0–0.9)
IMM GRANULOCYTES NFR BLD AUTO: 0.6 % (ref 0–0.9)
IMM GRANULOCYTES NFR BLD AUTO: 0.7 % (ref 0–0.9)
IMM GRANULOCYTES NFR BLD AUTO: 1.2 % (ref 0–0.9)
IMM GRANULOCYTES NFR BLD AUTO: 2.4 % (ref 0–0.9)
IMM GRANULOCYTES NFR BLD AUTO: 3.1 % (ref 0–0.9)
IMM GRANULOCYTES NFR BLD AUTO: 6.3 % (ref 0–0.9)
INR PPP: 1.05 (ref 0.87–1.13)
INR PPP: 1.5 (ref 0.87–1.13)
LACTATE SERPL-SCNC: 3.5 MMOL/L (ref 0.5–2)
LACTATE SERPL-SCNC: 3.6 MMOL/L (ref 0.5–2)
LACTATE SERPL-SCNC: 3.7 MMOL/L (ref 0.5–2)
LACTATE SERPL-SCNC: 3.8 MMOL/L (ref 0.5–2)
LACTATE SERPL-SCNC: 3.9 MMOL/L (ref 0.5–2)
LACTATE SERPL-SCNC: 4.3 MMOL/L (ref 0.5–2)
LACTATE SERPL-SCNC: 4.7 MMOL/L (ref 0.5–2)
LG PLATELETS BLD QL SMEAR: NORMAL
LIPASE SERPL-CCNC: 223 U/L (ref 7–58)
LIPASE SERPL-CCNC: 28 U/L (ref 7–58)
LYMPHOCYTES # BLD AUTO: 0.55 K/UL (ref 1–4.8)
LYMPHOCYTES # BLD AUTO: 1.29 K/UL (ref 1–4.8)
LYMPHOCYTES # BLD AUTO: 1.34 K/UL (ref 1–4.8)
LYMPHOCYTES # BLD AUTO: 1.38 K/UL (ref 1–4.8)
LYMPHOCYTES # BLD AUTO: 1.48 K/UL (ref 1–4.8)
LYMPHOCYTES # BLD AUTO: 1.71 K/UL (ref 1–4.8)
LYMPHOCYTES # BLD AUTO: 2.03 K/UL (ref 1–4.8)
LYMPHOCYTES # BLD AUTO: 2.23 K/UL (ref 1–4.8)
LYMPHOCYTES # BLD AUTO: 2.25 K/UL (ref 1–4.8)
LYMPHOCYTES # BLD AUTO: 2.48 K/UL (ref 1–4.8)
LYMPHOCYTES NFR BLD: 12.9 % (ref 22–41)
LYMPHOCYTES NFR BLD: 24.8 % (ref 22–41)
LYMPHOCYTES NFR BLD: 3.8 % (ref 22–41)
LYMPHOCYTES NFR BLD: 30.1 % (ref 22–41)
LYMPHOCYTES NFR BLD: 5.3 % (ref 22–41)
LYMPHOCYTES NFR BLD: 6.7 % (ref 22–41)
LYMPHOCYTES NFR BLD: 7 % (ref 22–41)
LYMPHOCYTES NFR BLD: 8.4 % (ref 22–41)
LYMPHOCYTES NFR BLD: 8.7 % (ref 22–41)
LYMPHOCYTES NFR BLD: 9.9 % (ref 22–41)
MACROCYTES BLD QL SMEAR: ABNORMAL
MACROCYTES BLD QL SMEAR: ABNORMAL
MANUAL DIFF BLD: NORMAL
MCH RBC QN AUTO: 31.9 PG (ref 27–33)
MCH RBC QN AUTO: 32.2 PG (ref 27–33)
MCH RBC QN AUTO: 33.2 PG (ref 27–33)
MCH RBC QN AUTO: 33.8 PG (ref 27–33)
MCH RBC QN AUTO: 33.9 PG (ref 27–33)
MCH RBC QN AUTO: 34.4 PG (ref 27–33)
MCH RBC QN AUTO: 34.5 PG (ref 27–33)
MCH RBC QN AUTO: 36 PG (ref 27–33)
MCH RBC QN AUTO: 36.2 PG (ref 27–33)
MCH RBC QN AUTO: 36.2 PG (ref 27–33)
MCH RBC QN AUTO: 36.6 PG (ref 27–33)
MCH RBC QN AUTO: 36.7 PG (ref 27–33)
MCH RBC QN AUTO: 36.8 PG (ref 27–33)
MCH RBC QN AUTO: 37 PG (ref 27–33)
MCHC RBC AUTO-ENTMCNC: 32.4 G/DL (ref 33.6–35)
MCHC RBC AUTO-ENTMCNC: 32.6 G/DL (ref 33.6–35)
MCHC RBC AUTO-ENTMCNC: 33 G/DL (ref 33.6–35)
MCHC RBC AUTO-ENTMCNC: 33.3 G/DL (ref 33.6–35)
MCHC RBC AUTO-ENTMCNC: 33.3 G/DL (ref 33.6–35)
MCHC RBC AUTO-ENTMCNC: 33.7 G/DL (ref 33.6–35)
MCHC RBC AUTO-ENTMCNC: 33.9 G/DL (ref 33.6–35)
MCHC RBC AUTO-ENTMCNC: 34.1 G/DL (ref 33.6–35)
MCHC RBC AUTO-ENTMCNC: 34.1 G/DL (ref 33.6–35)
MCHC RBC AUTO-ENTMCNC: 34.2 G/DL (ref 33.6–35)
MCHC RBC AUTO-ENTMCNC: 34.5 G/DL (ref 33.6–35)
MCHC RBC AUTO-ENTMCNC: 34.6 G/DL (ref 33.6–35)
MCHC RBC AUTO-ENTMCNC: 35 G/DL (ref 33.6–35)
MCHC RBC AUTO-ENTMCNC: 35.9 G/DL (ref 33.6–35)
MCV RBC AUTO: 101.6 FL (ref 81.4–97.8)
MCV RBC AUTO: 107.3 FL (ref 81.4–97.8)
MCV RBC AUTO: 107.3 FL (ref 81.4–97.8)
MCV RBC AUTO: 110.4 FL (ref 81.4–97.8)
MCV RBC AUTO: 110.5 FL (ref 81.4–97.8)
MCV RBC AUTO: 111 FL (ref 81.4–97.8)
MCV RBC AUTO: 111 FL (ref 81.4–97.8)
MCV RBC AUTO: 111.7 FL (ref 81.4–97.8)
MCV RBC AUTO: 92.1 FL (ref 81.4–97.8)
MCV RBC AUTO: 94.4 FL (ref 81.4–97.8)
MCV RBC AUTO: 94.7 FL (ref 81.4–97.8)
MCV RBC AUTO: 96 FL (ref 81.4–97.8)
MCV RBC AUTO: 98.3 FL (ref 81.4–97.8)
MCV RBC AUTO: 99.1 FL (ref 81.4–97.8)
METAMYELOCYTES NFR BLD MANUAL: 0.9 %
MONOCYTES # BLD AUTO: 0.36 K/UL (ref 0–0.85)
MONOCYTES # BLD AUTO: 0.76 K/UL (ref 0–0.85)
MONOCYTES # BLD AUTO: 0.83 K/UL (ref 0–0.85)
MONOCYTES # BLD AUTO: 1.11 K/UL (ref 0–0.85)
MONOCYTES # BLD AUTO: 1.45 K/UL (ref 0–0.85)
MONOCYTES # BLD AUTO: 1.48 K/UL (ref 0–0.85)
MONOCYTES # BLD AUTO: 1.6 K/UL (ref 0–0.85)
MONOCYTES # BLD AUTO: 1.95 K/UL (ref 0–0.85)
MONOCYTES # BLD AUTO: 2.31 K/UL (ref 0–0.85)
MONOCYTES # BLD AUTO: 4.75 K/UL (ref 0–0.85)
MONOCYTES NFR BLD AUTO: 10.1 % (ref 0–13.4)
MONOCYTES NFR BLD AUTO: 19.7 % (ref 0–13.4)
MONOCYTES NFR BLD AUTO: 5.8 % (ref 0–13.4)
MONOCYTES NFR BLD AUTO: 6.9 % (ref 0–13.4)
MONOCYTES NFR BLD AUTO: 7 % (ref 0–13.4)
MONOCYTES NFR BLD AUTO: 7 % (ref 0–13.4)
MONOCYTES NFR BLD AUTO: 7.1 % (ref 0–13.4)
MONOCYTES NFR BLD AUTO: 8 % (ref 0–13.4)
MONOCYTES NFR BLD AUTO: 8.1 % (ref 0–13.4)
MONOCYTES NFR BLD AUTO: 8.1 % (ref 0–13.4)
MORPHOLOGY BLD-IMP: NORMAL
MYELOCYTES NFR BLD MANUAL: 2.1 %
NEUTROPHILS # BLD AUTO: 0.89 K/UL (ref 2–7.15)
NEUTROPHILS # BLD AUTO: 13.35 K/UL (ref 2–7.15)
NEUTROPHILS # BLD AUTO: 16.07 K/UL (ref 2–7.15)
NEUTROPHILS # BLD AUTO: 17.52 K/UL (ref 2–7.15)
NEUTROPHILS # BLD AUTO: 20.17 K/UL (ref 2–7.15)
NEUTROPHILS # BLD AUTO: 20.71 K/UL (ref 2–7.15)
NEUTROPHILS # BLD AUTO: 26.7 K/UL (ref 2–7.15)
NEUTROPHILS # BLD AUTO: 49.63 K/UL (ref 2–7.15)
NEUTROPHILS # BLD AUTO: 5.01 K/UL (ref 2–7.15)
NEUTROPHILS # BLD AUTO: 8.25 K/UL (ref 2–7.15)
NEUTROPHILS NFR BLD: 48.7 % (ref 44–72)
NEUTROPHILS NFR BLD: 61.1 % (ref 44–72)
NEUTROPHILS NFR BLD: 77.2 % (ref 44–72)
NEUTROPHILS NFR BLD: 79.1 % (ref 44–72)
NEUTROPHILS NFR BLD: 80.2 % (ref 44–72)
NEUTROPHILS NFR BLD: 81.3 % (ref 44–72)
NEUTROPHILS NFR BLD: 82.7 % (ref 44–72)
NEUTROPHILS NFR BLD: 83.5 % (ref 44–72)
NEUTROPHILS NFR BLD: 84.7 % (ref 44–72)
NEUTROPHILS NFR BLD: 85 % (ref 44–72)
NRBC # BLD AUTO: 0 K/UL
NRBC # BLD AUTO: 0.02 K/UL
NRBC # BLD AUTO: 0.06 K/UL
NRBC # BLD AUTO: 0.17 K/UL
NRBC # BLD AUTO: 0.29 K/UL
NRBC # BLD AUTO: 0.35 K/UL
NRBC # BLD AUTO: 0.72 K/UL
NRBC BLD-RTO: 0 /100 WBC
NRBC BLD-RTO: 0.1 /100 WBC
NRBC BLD-RTO: 0.2 /100 WBC
NRBC BLD-RTO: 0.5 /100 WBC
NRBC BLD-RTO: 0.6 /100 WBC
NRBC BLD-RTO: 1.2 /100 WBC
NRBC BLD-RTO: 3.4 /100 WBC
OUTPT INFUS CBC COMMENT OICOM: ABNORMAL
PATH REV: NORMAL
PATH REV: NORMAL
PATHOLOGY CONSULT NOTE: NORMAL
PATHOLOGY CONSULT NOTE: NORMAL
PLATELET # BLD AUTO: 101 K/UL (ref 164–446)
PLATELET # BLD AUTO: 116 K/UL (ref 164–446)
PLATELET # BLD AUTO: 153 K/UL (ref 164–446)
PLATELET # BLD AUTO: 164 K/UL (ref 164–446)
PLATELET # BLD AUTO: 168 K/UL (ref 164–446)
PLATELET # BLD AUTO: 227 K/UL (ref 164–446)
PLATELET # BLD AUTO: 248 K/UL (ref 164–446)
PLATELET # BLD AUTO: 276 K/UL (ref 164–446)
PLATELET # BLD AUTO: 287 K/UL (ref 164–446)
PLATELET # BLD AUTO: 59 K/UL (ref 164–446)
PLATELET # BLD AUTO: 66 K/UL (ref 164–446)
PLATELET # BLD AUTO: 89 K/UL (ref 164–446)
PLATELET # BLD AUTO: 97 K/UL (ref 164–446)
PLATELET # BLD AUTO: 99 K/UL (ref 164–446)
PLATELET BLD QL SMEAR: NORMAL
PLATELET BLD QL SMEAR: NORMAL
PMV BLD AUTO: 10 FL (ref 9–12.9)
PMV BLD AUTO: 10.1 FL (ref 9–12.9)
PMV BLD AUTO: 10.3 FL (ref 9–12.9)
PMV BLD AUTO: 10.4 FL (ref 9–12.9)
PMV BLD AUTO: 10.4 FL (ref 9–12.9)
PMV BLD AUTO: 10.5 FL (ref 9–12.9)
PMV BLD AUTO: 10.5 FL (ref 9–12.9)
PMV BLD AUTO: 10.8 FL (ref 9–12.9)
PMV BLD AUTO: 9.6 FL (ref 9–12.9)
PMV BLD AUTO: 9.8 FL (ref 9–12.9)
PMV BLD AUTO: 9.9 FL (ref 9–12.9)
PMV BLD AUTO: 9.9 FL (ref 9–12.9)
POIKILOCYTOSIS BLD QL SMEAR: NORMAL
POLYCHROMASIA BLD QL SMEAR: NORMAL
POLYCHROMASIA BLD QL SMEAR: NORMAL
POTASSIUM SERPL-SCNC: 2.5 MMOL/L (ref 3.6–5.5)
POTASSIUM SERPL-SCNC: 2.9 MMOL/L (ref 3.6–5.5)
POTASSIUM SERPL-SCNC: 3.1 MMOL/L (ref 3.6–5.5)
POTASSIUM SERPL-SCNC: 3.2 MMOL/L (ref 3.6–5.5)
POTASSIUM SERPL-SCNC: 3.4 MMOL/L (ref 3.6–5.5)
POTASSIUM SERPL-SCNC: 3.5 MMOL/L (ref 3.6–5.5)
POTASSIUM SERPL-SCNC: 3.8 MMOL/L (ref 3.6–5.5)
POTASSIUM SERPL-SCNC: 3.9 MMOL/L (ref 3.6–5.5)
POTASSIUM SERPL-SCNC: 4 MMOL/L (ref 3.6–5.5)
PROCALCITONIN SERPL-MCNC: 0.91 NG/ML
PROMYELOCYTES NFR BLD MANUAL: 0.4 %
PROT SERPL-MCNC: 5 G/DL (ref 6–8.2)
PROT SERPL-MCNC: 5.1 G/DL (ref 6–8.2)
PROT SERPL-MCNC: 5.2 G/DL (ref 6–8.2)
PROT SERPL-MCNC: 5.2 G/DL (ref 6–8.2)
PROT SERPL-MCNC: 5.3 G/DL (ref 6–8.2)
PROT SERPL-MCNC: 5.7 G/DL (ref 6–8.2)
PROT SERPL-MCNC: 5.7 G/DL (ref 6–8.2)
PROT SERPL-MCNC: 6.1 G/DL (ref 6–8.2)
PROT SERPL-MCNC: 6.1 G/DL (ref 6–8.2)
PROT SERPL-MCNC: 6.6 G/DL (ref 6–8.2)
PROT SERPL-MCNC: 6.8 G/DL (ref 6–8.2)
PROT SERPL-MCNC: 7.1 G/DL (ref 6–8.2)
PROT SERPL-MCNC: 7.7 G/DL (ref 6–8.2)
PROT SERPL-MCNC: 7.7 G/DL (ref 6–8.2)
PROTHROMBIN TIME: 13.4 SEC (ref 12–14.6)
PROTHROMBIN TIME: 17.4 SEC (ref 12–14.6)
RBC # BLD AUTO: 3.15 M/UL (ref 4.2–5.4)
RBC # BLD AUTO: 3.25 M/UL (ref 4.2–5.4)
RBC # BLD AUTO: 3.34 M/UL (ref 4.2–5.4)
RBC # BLD AUTO: 3.44 M/UL (ref 4.2–5.4)
RBC # BLD AUTO: 3.45 M/UL (ref 4.2–5.4)
RBC # BLD AUTO: 3.46 M/UL (ref 4.2–5.4)
RBC # BLD AUTO: 3.54 M/UL (ref 4.2–5.4)
RBC # BLD AUTO: 3.78 M/UL (ref 4.2–5.4)
RBC # BLD AUTO: 4 M/UL (ref 4.2–5.4)
RBC # BLD AUTO: 4.01 M/UL (ref 4.2–5.4)
RBC # BLD AUTO: 4.32 M/UL (ref 4.2–5.4)
RBC # BLD AUTO: 4.37 M/UL (ref 4.2–5.4)
RBC # BLD AUTO: 4.5 M/UL (ref 4.2–5.4)
RBC # BLD AUTO: 4.55 M/UL (ref 4.2–5.4)
RBC BLD AUTO: PRESENT
RBC BLD AUTO: PRESENT
RH BLD: NORMAL
SIGNIFICANT IND 70042: NORMAL
SIGNIFICANT IND 70042: NORMAL
SITE SITE: NORMAL
SITE SITE: NORMAL
SODIUM SERPL-SCNC: 127 MMOL/L (ref 135–145)
SODIUM SERPL-SCNC: 128 MMOL/L (ref 135–145)
SODIUM SERPL-SCNC: 129 MMOL/L (ref 135–145)
SODIUM SERPL-SCNC: 130 MMOL/L (ref 135–145)
SODIUM SERPL-SCNC: 132 MMOL/L (ref 135–145)
SODIUM SERPL-SCNC: 133 MMOL/L (ref 135–145)
SODIUM SERPL-SCNC: 135 MMOL/L (ref 135–145)
SODIUM SERPL-SCNC: 137 MMOL/L (ref 135–145)
SOURCE SOURCE: NORMAL
SOURCE SOURCE: NORMAL
WBC # BLD AUTO: 1.8 K/UL (ref 4.8–10.8)
WBC # BLD AUTO: 10.7 K/UL (ref 4.8–10.8)
WBC # BLD AUTO: 14.7 K/UL (ref 4.8–10.8)
WBC # BLD AUTO: 16 K/UL (ref 4.8–10.8)
WBC # BLD AUTO: 16.2 K/UL (ref 4.8–10.8)
WBC # BLD AUTO: 16.8 K/UL (ref 4.8–10.8)
WBC # BLD AUTO: 19.8 K/UL (ref 4.8–10.8)
WBC # BLD AUTO: 21.2 K/UL (ref 4.8–10.8)
WBC # BLD AUTO: 21.3 K/UL (ref 4.8–10.8)
WBC # BLD AUTO: 24.4 K/UL (ref 4.8–10.8)
WBC # BLD AUTO: 25.2 K/UL (ref 4.8–10.8)
WBC # BLD AUTO: 33.7 K/UL (ref 4.8–10.8)
WBC # BLD AUTO: 58.6 K/UL (ref 4.8–10.8)
WBC # BLD AUTO: 8.2 K/UL (ref 4.8–10.8)

## 2022-01-01 PROCEDURE — 160046 HCHG PACU - 1ST 60 MINS PHASE II: Performed by: SURGERY

## 2022-01-01 PROCEDURE — 86301 IMMUNOASSAY TUMOR CA 19-9: CPT

## 2022-01-01 PROCEDURE — 700111 HCHG RX REV CODE 636 W/ 250 OVERRIDE (IP): Performed by: INTERNAL MEDICINE

## 2022-01-01 PROCEDURE — 99204 OFFICE O/P NEW MOD 45 MIN: CPT | Performed by: INTERNAL MEDICINE

## 2022-01-01 PROCEDURE — G0498 CHEMO EXTEND IV INFUS W/PUMP: HCPCS

## 2022-01-01 PROCEDURE — 160002 HCHG RECOVERY MINUTES (STAT): Performed by: SURGERY

## 2022-01-01 PROCEDURE — 96415 CHEMO IV INFUSION ADDL HR: CPT

## 2022-01-01 PROCEDURE — 85027 COMPLETE CBC AUTOMATED: CPT

## 2022-01-01 PROCEDURE — 83605 ASSAY OF LACTIC ACID: CPT | Mod: 91

## 2022-01-01 PROCEDURE — A9270 NON-COVERED ITEM OR SERVICE: HCPCS | Performed by: INTERNAL MEDICINE

## 2022-01-01 PROCEDURE — 700117 HCHG RX CONTRAST REV CODE 255: Performed by: EMERGENCY MEDICINE

## 2022-01-01 PROCEDURE — 700105 HCHG RX REV CODE 258: Performed by: INTERNAL MEDICINE

## 2022-01-01 PROCEDURE — RXMED WILLOW AMBULATORY MEDICATION CHARGE: Performed by: INTERNAL MEDICINE

## 2022-01-01 PROCEDURE — 85025 COMPLETE CBC W/AUTO DIFF WBC: CPT

## 2022-01-01 PROCEDURE — 700111 HCHG RX REV CODE 636 W/ 250 OVERRIDE (IP)

## 2022-01-01 PROCEDURE — 80053 COMPREHEN METABOLIC PANEL: CPT

## 2022-01-01 PROCEDURE — 86850 RBC ANTIBODY SCREEN: CPT

## 2022-01-01 PROCEDURE — 160048 HCHG OR STATISTICAL LEVEL 1-5: Performed by: INTERNAL MEDICINE

## 2022-01-01 PROCEDURE — 700102 HCHG RX REV CODE 250 W/ 637 OVERRIDE(OP): Performed by: INTERNAL MEDICINE

## 2022-01-01 PROCEDURE — 99233 SBSQ HOSP IP/OBS HIGH 50: CPT | Performed by: HOSPITALIST

## 2022-01-01 PROCEDURE — 700101 HCHG RX REV CODE 250: Performed by: SURGERY

## 2022-01-01 PROCEDURE — 99212 OFFICE O/P EST SF 10 MIN: CPT | Performed by: STUDENT IN AN ORGANIZED HEALTH CARE EDUCATION/TRAINING PROGRAM

## 2022-01-01 PROCEDURE — 770020 HCHG ROOM/CARE - TELE (206)

## 2022-01-01 PROCEDURE — 0JH60WZ INSERTION OF TOTALLY IMPLANTABLE VASCULAR ACCESS DEVICE INTO CHEST SUBCUTANEOUS TISSUE AND FASCIA, OPEN APPROACH: ICD-10-PCS | Performed by: SURGERY

## 2022-01-01 PROCEDURE — 160208 HCHG ENDO MINUTES - EA ADDL 1 MIN LEVEL 4: Performed by: INTERNAL MEDICINE

## 2022-01-01 PROCEDURE — 99239 HOSP IP/OBS DSCHRG MGMT >30: CPT | Performed by: HOSPITALIST

## 2022-01-01 PROCEDURE — 86900 BLOOD TYPING SEROLOGIC ABO: CPT

## 2022-01-01 PROCEDURE — 160009 HCHG ANES TIME/MIN: Performed by: SURGERY

## 2022-01-01 PROCEDURE — 700105 HCHG RX REV CODE 258: Performed by: NURSE PRACTITIONER

## 2022-01-01 PROCEDURE — 96411 CHEMO IV PUSH ADDL DRUG: CPT

## 2022-01-01 PROCEDURE — 700111 HCHG RX REV CODE 636 W/ 250 OVERRIDE (IP): Performed by: ANESTHESIOLOGY

## 2022-01-01 PROCEDURE — 93005 ELECTROCARDIOGRAM TRACING: CPT

## 2022-01-01 PROCEDURE — 700111 HCHG RX REV CODE 636 W/ 250 OVERRIDE (IP): Performed by: STUDENT IN AN ORGANIZED HEALTH CARE EDUCATION/TRAINING PROGRAM

## 2022-01-01 PROCEDURE — 96365 THER/PROPH/DIAG IV INF INIT: CPT

## 2022-01-01 PROCEDURE — 74177 CT ABD & PELVIS W/CONTRAST: CPT

## 2022-01-01 PROCEDURE — 80503 PATH CLIN CONSLTJ SF 5-20: CPT

## 2022-01-01 PROCEDURE — 770006 HCHG ROOM/CARE - MED/SURG/GYN SEMI*

## 2022-01-01 PROCEDURE — 160035 HCHG PACU - 1ST 60 MINS PHASE I: Performed by: INTERNAL MEDICINE

## 2022-01-01 PROCEDURE — 82378 CARCINOEMBRYONIC ANTIGEN: CPT

## 2022-01-01 PROCEDURE — 96413 CHEMO IV INFUSION 1 HR: CPT

## 2022-01-01 PROCEDURE — 160048 HCHG OR STATISTICAL LEVEL 1-5: Performed by: SURGERY

## 2022-01-01 PROCEDURE — 99233 SBSQ HOSP IP/OBS HIGH 50: CPT | Performed by: INTERNAL MEDICINE

## 2022-01-01 PROCEDURE — 88312 SPECIAL STAINS GROUP 1: CPT

## 2022-01-01 PROCEDURE — 700111 HCHG RX REV CODE 636 W/ 250 OVERRIDE (IP): Performed by: NURSE PRACTITIONER

## 2022-01-01 PROCEDURE — 99223 1ST HOSP IP/OBS HIGH 75: CPT | Performed by: INTERNAL MEDICINE

## 2022-01-01 PROCEDURE — A4212 NON CORING NEEDLE OR STYLET: HCPCS

## 2022-01-01 PROCEDURE — 80076 HEPATIC FUNCTION PANEL: CPT

## 2022-01-01 PROCEDURE — 0DB68ZX EXCISION OF STOMACH, VIA NATURAL OR ARTIFICIAL OPENING ENDOSCOPIC, DIAGNOSTIC: ICD-10-PCS | Performed by: INTERNAL MEDICINE

## 2022-01-01 PROCEDURE — 85610 PROTHROMBIN TIME: CPT

## 2022-01-01 PROCEDURE — 84145 PROCALCITONIN (PCT): CPT

## 2022-01-01 PROCEDURE — 700101 HCHG RX REV CODE 250: Performed by: ANESTHESIOLOGY

## 2022-01-01 PROCEDURE — 36415 COLL VENOUS BLD VENIPUNCTURE: CPT

## 2022-01-01 PROCEDURE — 83690 ASSAY OF LIPASE: CPT

## 2022-01-01 PROCEDURE — 88305 TISSUE EXAM BY PATHOLOGIST: CPT

## 2022-01-01 PROCEDURE — 94760 N-INVAS EAR/PLS OXIMETRY 1: CPT

## 2022-01-01 PROCEDURE — 36561 INSERT TUNNELED CV CATH: CPT | Mod: RT | Performed by: SURGERY

## 2022-01-01 PROCEDURE — 96417 CHEMO IV INFUS EACH ADDL SEQ: CPT

## 2022-01-01 PROCEDURE — C1769 GUIDE WIRE: HCPCS | Performed by: INTERNAL MEDICINE

## 2022-01-01 PROCEDURE — 700105 HCHG RX REV CODE 258: Performed by: EMERGENCY MEDICINE

## 2022-01-01 PROCEDURE — 700105 HCHG RX REV CODE 258: Performed by: STUDENT IN AN ORGANIZED HEALTH CARE EDUCATION/TRAINING PROGRAM

## 2022-01-01 PROCEDURE — 160025 RECOVERY II MINUTES (STATS): Performed by: SURGERY

## 2022-01-01 PROCEDURE — 700102 HCHG RX REV CODE 250 W/ 637 OVERRIDE(OP): Performed by: NURSE PRACTITIONER

## 2022-01-01 PROCEDURE — 99214 OFFICE O/P EST MOD 30 MIN: CPT | Performed by: NURSE PRACTITIONER

## 2022-01-01 PROCEDURE — 74328 X-RAY BILE DUCT ENDOSCOPY: CPT

## 2022-01-01 PROCEDURE — 0DB98ZX EXCISION OF DUODENUM, VIA NATURAL OR ARTIFICIAL OPENING ENDOSCOPIC, DIAGNOSTIC: ICD-10-PCS | Performed by: INTERNAL MEDICINE

## 2022-01-01 PROCEDURE — 02HV33Z INSERTION OF INFUSION DEVICE INTO SUPERIOR VENA CAVA, PERCUTANEOUS APPROACH: ICD-10-PCS | Performed by: SURGERY

## 2022-01-01 PROCEDURE — 160036 HCHG PACU - EA ADDL 30 MINS PHASE I: Performed by: INTERNAL MEDICINE

## 2022-01-01 PROCEDURE — 00731 ANES UPR GI NDSC PX NOS: CPT | Performed by: ANESTHESIOLOGY

## 2022-01-01 PROCEDURE — A9552 F18 FDG: HCPCS

## 2022-01-01 PROCEDURE — 88172 CYTP DX EVAL FNA 1ST EA SITE: CPT

## 2022-01-01 PROCEDURE — 99213 OFFICE O/P EST LOW 20 MIN: CPT | Performed by: STUDENT IN AN ORGANIZED HEALTH CARE EDUCATION/TRAINING PROGRAM

## 2022-01-01 PROCEDURE — 700111 HCHG RX REV CODE 636 W/ 250 OVERRIDE (IP): Performed by: SURGERY

## 2022-01-01 PROCEDURE — 700111 HCHG RX REV CODE 636 W/ 250 OVERRIDE (IP): Performed by: HOSPITALIST

## 2022-01-01 PROCEDURE — 700111 HCHG RX REV CODE 636 W/ 250 OVERRIDE (IP): Performed by: EMERGENCY MEDICINE

## 2022-01-01 PROCEDURE — 160025 RECOVERY II MINUTES (STATS): Performed by: INTERNAL MEDICINE

## 2022-01-01 PROCEDURE — 160035 HCHG PACU - 1ST 60 MINS PHASE I: Performed by: SURGERY

## 2022-01-01 PROCEDURE — 99212 OFFICE O/P EST SF 10 MIN: CPT | Performed by: NURSE PRACTITIONER

## 2022-01-01 PROCEDURE — 160203 HCHG ENDO MINUTES - 1ST 30 MINS LEVEL 4: Performed by: INTERNAL MEDICINE

## 2022-01-01 PROCEDURE — 99231 SBSQ HOSP IP/OBS SF/LOW 25: CPT | Performed by: INTERNAL MEDICINE

## 2022-01-01 PROCEDURE — 80048 BASIC METABOLIC PNL TOTAL CA: CPT

## 2022-01-01 PROCEDURE — 96523 IRRIG DRUG DELIVERY DEVICE: CPT

## 2022-01-01 PROCEDURE — RXMED WILLOW AMBULATORY MEDICATION CHARGE: Performed by: SURGERY

## 2022-01-01 PROCEDURE — 700101 HCHG RX REV CODE 250: Performed by: HOSPITALIST

## 2022-01-01 PROCEDURE — 160028 HCHG SURGERY MINUTES - 1ST 30 MINS LEVEL 3: Performed by: SURGERY

## 2022-01-01 PROCEDURE — 93970 EXTREMITY STUDY: CPT

## 2022-01-01 PROCEDURE — 96375 TX/PRO/DX INJ NEW DRUG ADDON: CPT

## 2022-01-01 PROCEDURE — 160009 HCHG ANES TIME/MIN: Performed by: INTERNAL MEDICINE

## 2022-01-01 PROCEDURE — 96377 APPLICATON ON-BODY INJECTOR: CPT

## 2022-01-01 PROCEDURE — 99205 OFFICE O/P NEW HI 60 MIN: CPT | Performed by: SURGERY

## 2022-01-01 PROCEDURE — 87040 BLOOD CULTURE FOR BACTERIA: CPT

## 2022-01-01 PROCEDURE — 306637 HCHG MISC ORTHO ITEM RC 0274

## 2022-01-01 PROCEDURE — 160002 HCHG RECOVERY MINUTES (STAT): Performed by: INTERNAL MEDICINE

## 2022-01-01 PROCEDURE — 74181 MRI ABDOMEN W/O CONTRAST: CPT

## 2022-01-01 PROCEDURE — 99497 ADVNCD CARE PLAN 30 MIN: CPT | Performed by: NURSE PRACTITIONER

## 2022-01-01 PROCEDURE — 88173 CYTOPATH EVAL FNA REPORT: CPT

## 2022-01-01 PROCEDURE — 96366 THER/PROPH/DIAG IV INF ADDON: CPT

## 2022-01-01 PROCEDURE — A9270 NON-COVERED ITEM OR SERVICE: HCPCS | Performed by: NURSE PRACTITIONER

## 2022-01-01 PROCEDURE — 160039 HCHG SURGERY MINUTES - EA ADDL 1 MIN LEVEL 3: Performed by: SURGERY

## 2022-01-01 PROCEDURE — C1788 PORT, INDWELLING, IMP: HCPCS | Performed by: SURGERY

## 2022-01-01 PROCEDURE — 93010 ELECTROCARDIOGRAM REPORT: CPT | Performed by: INTERNAL MEDICINE

## 2022-01-01 PROCEDURE — 85007 BL SMEAR W/DIFF WBC COUNT: CPT

## 2022-01-01 PROCEDURE — 88307 TISSUE EXAM BY PATHOLOGIST: CPT

## 2022-01-01 PROCEDURE — 770001 HCHG ROOM/CARE - MED/SURG/GYN PRIV*

## 2022-01-01 PROCEDURE — 36591 DRAW BLOOD OFF VENOUS DEVICE: CPT

## 2022-01-01 PROCEDURE — 700117 HCHG RX CONTRAST REV CODE 255: Performed by: INTERNAL MEDICINE

## 2022-01-01 PROCEDURE — 76705 ECHO EXAM OF ABDOMEN: CPT

## 2022-01-01 PROCEDURE — 88305 TISSUE EXAM BY PATHOLOGIST: CPT | Mod: 59

## 2022-01-01 PROCEDURE — 96361 HYDRATE IV INFUSION ADD-ON: CPT

## 2022-01-01 PROCEDURE — 99285 EMERGENCY DEPT VISIT HI MDM: CPT

## 2022-01-01 PROCEDURE — 00532 ANES ACCESS CTR VENOUS CRCJ: CPT | Performed by: ANESTHESIOLOGY

## 2022-01-01 PROCEDURE — 0FC68ZZ EXTIRPATION OF MATTER FROM LEFT HEPATIC DUCT, VIA NATURAL OR ARTIFICIAL OPENING ENDOSCOPIC: ICD-10-PCS | Performed by: INTERNAL MEDICINE

## 2022-01-01 PROCEDURE — 71260 CT THORAX DX C+: CPT

## 2022-01-01 PROCEDURE — 36593 DECLOT VASCULAR DEVICE: CPT

## 2022-01-01 PROCEDURE — 700117 HCHG RX CONTRAST REV CODE 255: Performed by: STUDENT IN AN ORGANIZED HEALTH CARE EDUCATION/TRAINING PROGRAM

## 2022-01-01 PROCEDURE — 700105 HCHG RX REV CODE 258: Performed by: SURGERY

## 2022-01-01 PROCEDURE — 93970 EXTREMITY STUDY: CPT | Mod: 26 | Performed by: INTERNAL MEDICINE

## 2022-01-01 PROCEDURE — 36561 INSERT TUNNELED CV CATH: CPT | Mod: AS,RT | Performed by: NURSE PRACTITIONER

## 2022-01-01 PROCEDURE — 160046 HCHG PACU - 1ST 60 MINS PHASE II: Performed by: INTERNAL MEDICINE

## 2022-01-01 PROCEDURE — 86901 BLOOD TYPING SEROLOGIC RH(D): CPT

## 2022-01-01 PROCEDURE — C1889 IMPLANT/INSERT DEVICE, NOC: HCPCS | Performed by: INTERNAL MEDICINE

## 2022-01-01 PROCEDURE — 96367 TX/PROPH/DG ADDL SEQ IV INF: CPT

## 2022-01-01 PROCEDURE — 700117 HCHG RX CONTRAST REV CODE 255: Performed by: SURGERY

## 2022-01-01 PROCEDURE — 74170 CT ABD WO CNTRST FLWD CNTRST: CPT

## 2022-01-01 PROCEDURE — 00732 ANES UPR GI NDSC PX ERCP: CPT | Performed by: ANESTHESIOLOGY

## 2022-01-01 DEVICE — POWER PORTMRI COMPATABLE: Type: IMPLANTABLE DEVICE | Site: CHEST | Status: FUNCTIONAL

## 2022-01-01 RX ORDER — HEPARIN SODIUM (PORCINE) LOCK FLUSH IV SOLN 100 UNIT/ML 100 UNIT/ML
500 SOLUTION INTRAVENOUS PRN
Status: CANCELLED | OUTPATIENT
Start: 2022-01-01

## 2022-01-01 RX ORDER — HEPARIN SODIUM (PORCINE) LOCK FLUSH IV SOLN 100 UNIT/ML 100 UNIT/ML
SOLUTION INTRAVENOUS
Status: COMPLETED
Start: 2022-01-01 | End: 2022-01-01

## 2022-01-01 RX ORDER — 0.9 % SODIUM CHLORIDE 0.9 %
VIAL (ML) INJECTION PRN
Status: CANCELLED | OUTPATIENT
Start: 2022-01-01

## 2022-01-01 RX ORDER — 0.9 % SODIUM CHLORIDE 0.9 %
10 VIAL (ML) INJECTION PRN
Status: CANCELLED | OUTPATIENT
Start: 2022-01-01

## 2022-01-01 RX ORDER — 0.9 % SODIUM CHLORIDE 0.9 %
3 VIAL (ML) INJECTION PRN
Status: CANCELLED | OUTPATIENT
Start: 2022-01-01

## 2022-01-01 RX ORDER — EPINEPHRINE 1 MG/ML(1)
0.5 AMPUL (ML) INJECTION PRN
Status: CANCELLED | OUTPATIENT
Start: 2022-01-01

## 2022-01-01 RX ORDER — AMOXICILLIN 250 MG
2 CAPSULE ORAL 2 TIMES DAILY
Status: DISCONTINUED | OUTPATIENT
Start: 2022-01-01 | End: 2022-01-01 | Stop reason: HOSPADM

## 2022-01-01 RX ORDER — SODIUM CHLORIDE, SODIUM LACTATE, POTASSIUM CHLORIDE, CALCIUM CHLORIDE 600; 310; 30; 20 MG/100ML; MG/100ML; MG/100ML; MG/100ML
INJECTION, SOLUTION INTRAVENOUS CONTINUOUS
Status: DISCONTINUED | OUTPATIENT
Start: 2022-01-01 | End: 2022-01-01 | Stop reason: HOSPADM

## 2022-01-01 RX ORDER — CEFAZOLIN SODIUM 1 G/3ML
INJECTION, POWDER, FOR SOLUTION INTRAMUSCULAR; INTRAVENOUS PRN
Status: DISCONTINUED | OUTPATIENT
Start: 2022-01-01 | End: 2022-01-01 | Stop reason: SURG

## 2022-01-01 RX ORDER — POTASSIUM CHLORIDE 7.45 MG/ML
10 INJECTION INTRAVENOUS ONCE
Status: COMPLETED | OUTPATIENT
Start: 2022-01-01 | End: 2022-01-01

## 2022-01-01 RX ORDER — ONDANSETRON 2 MG/ML
8 INJECTION INTRAMUSCULAR; INTRAVENOUS ONCE
Status: CANCELLED | OUTPATIENT
Start: 2022-01-01 | End: 2022-10-31

## 2022-01-01 RX ORDER — HYDROMORPHONE HYDROCHLORIDE 1 MG/ML
0.4 INJECTION, SOLUTION INTRAMUSCULAR; INTRAVENOUS; SUBCUTANEOUS
Status: DISCONTINUED | OUTPATIENT
Start: 2022-01-01 | End: 2022-01-01 | Stop reason: HOSPADM

## 2022-01-01 RX ORDER — FUROSEMIDE 10 MG/ML
20 INJECTION INTRAMUSCULAR; INTRAVENOUS ONCE
Status: COMPLETED | OUTPATIENT
Start: 2022-01-01 | End: 2022-01-01

## 2022-01-01 RX ORDER — DEXAMETHASONE SODIUM PHOSPHATE 4 MG/ML
INJECTION, SOLUTION INTRA-ARTICULAR; INTRALESIONAL; INTRAMUSCULAR; INTRAVENOUS; SOFT TISSUE PRN
Status: DISCONTINUED | OUTPATIENT
Start: 2022-01-01 | End: 2022-01-01 | Stop reason: HOSPADM

## 2022-01-01 RX ORDER — PROCHLORPERAZINE MALEATE 10 MG
10 TABLET ORAL EVERY 6 HOURS PRN
Status: CANCELLED | OUTPATIENT
Start: 2022-01-01

## 2022-01-01 RX ORDER — DEXTROSE MONOHYDRATE 50 MG/ML
INJECTION, SOLUTION INTRAVENOUS CONTINUOUS
Status: DISCONTINUED | OUTPATIENT
Start: 2022-01-01 | End: 2022-01-01 | Stop reason: HOSPADM

## 2022-01-01 RX ORDER — LOPERAMIDE HYDROCHLORIDE 2 MG/1
4 CAPSULE ORAL PRN
Status: CANCELLED | OUTPATIENT
Start: 2022-01-01

## 2022-01-01 RX ORDER — PIPERACILLIN SODIUM, TAZOBACTAM SODIUM 3; .375 G/15ML; G/15ML
INJECTION, POWDER, LYOPHILIZED, FOR SOLUTION INTRAVENOUS PRN
Status: DISCONTINUED | OUTPATIENT
Start: 2022-01-01 | End: 2022-01-01 | Stop reason: SURG

## 2022-01-01 RX ORDER — BUPIVACAINE HYDROCHLORIDE AND EPINEPHRINE 5; 5 MG/ML; UG/ML
INJECTION, SOLUTION EPIDURAL; INTRACAUDAL; PERINEURAL
Status: DISCONTINUED | OUTPATIENT
Start: 2022-01-01 | End: 2022-01-01 | Stop reason: HOSPADM

## 2022-01-01 RX ORDER — ONDANSETRON 2 MG/ML
4 INJECTION INTRAMUSCULAR; INTRAVENOUS PRN
Status: CANCELLED | OUTPATIENT
Start: 2022-11-05

## 2022-01-01 RX ORDER — ONDANSETRON 8 MG/1
8 TABLET, ORALLY DISINTEGRATING ORAL PRN
Status: CANCELLED | OUTPATIENT
Start: 2022-01-01

## 2022-01-01 RX ORDER — 0.9 % SODIUM CHLORIDE 0.9 %
10 VIAL (ML) INJECTION PRN
Status: CANCELLED | OUTPATIENT
Start: 2022-11-05

## 2022-01-01 RX ORDER — HALOPERIDOL 5 MG/ML
1 INJECTION INTRAMUSCULAR
Status: DISCONTINUED | OUTPATIENT
Start: 2022-01-01 | End: 2022-01-01 | Stop reason: HOSPADM

## 2022-01-01 RX ORDER — CELECOXIB 200 MG/1
200 CAPSULE ORAL 2 TIMES DAILY
Qty: 60 CAPSULE | Refills: 1 | Status: SHIPPED | OUTPATIENT
Start: 2022-01-01 | End: 2022-01-01 | Stop reason: SDUPTHER

## 2022-01-01 RX ORDER — DIPHENHYDRAMINE HYDROCHLORIDE 50 MG/ML
50 INJECTION INTRAMUSCULAR; INTRAVENOUS PRN
Status: CANCELLED | OUTPATIENT
Start: 2022-01-01

## 2022-01-01 RX ORDER — SODIUM CHLORIDE 9 MG/ML
INJECTION, SOLUTION INTRAVENOUS CONTINUOUS
Status: CANCELLED | OUTPATIENT
Start: 2022-01-01

## 2022-01-01 RX ORDER — HEPARIN SODIUM,PORCINE 1000/ML
VIAL (ML) INJECTION
Status: DISCONTINUED | OUTPATIENT
Start: 2022-01-01 | End: 2022-01-01 | Stop reason: HOSPADM

## 2022-01-01 RX ORDER — KETOROLAC TROMETHAMINE 30 MG/ML
INJECTION, SOLUTION INTRAMUSCULAR; INTRAVENOUS PRN
Status: DISCONTINUED | OUTPATIENT
Start: 2022-01-01 | End: 2022-01-01 | Stop reason: SURG

## 2022-01-01 RX ORDER — HEPARIN SODIUM (PORCINE) LOCK FLUSH IV SOLN 100 UNIT/ML 100 UNIT/ML
500 SOLUTION INTRAVENOUS PRN
Status: CANCELLED | OUTPATIENT
Start: 2022-11-05

## 2022-01-01 RX ORDER — DEXTROSE MONOHYDRATE 50 MG/ML
INJECTION, SOLUTION INTRAVENOUS CONTINUOUS
Status: CANCELLED | OUTPATIENT
Start: 2022-01-01

## 2022-01-01 RX ORDER — METOPROLOL TARTRATE 1 MG/ML
1 INJECTION, SOLUTION INTRAVENOUS
Status: DISCONTINUED | OUTPATIENT
Start: 2022-01-01 | End: 2022-01-01 | Stop reason: HOSPADM

## 2022-01-01 RX ORDER — ACETAMINOPHEN 325 MG/1
650 TABLET ORAL EVERY 4 HOURS PRN
Status: DISCONTINUED | OUTPATIENT
Start: 2022-01-01 | End: 2022-01-01 | Stop reason: HOSPADM

## 2022-01-01 RX ORDER — DIPHENHYDRAMINE HYDROCHLORIDE 50 MG/ML
12.5 INJECTION INTRAMUSCULAR; INTRAVENOUS
Status: DISCONTINUED | OUTPATIENT
Start: 2022-01-01 | End: 2022-01-01 | Stop reason: HOSPADM

## 2022-01-01 RX ORDER — ONDANSETRON 2 MG/ML
4 INJECTION INTRAMUSCULAR; INTRAVENOUS PRN
Status: CANCELLED | OUTPATIENT
Start: 2022-01-01

## 2022-01-01 RX ORDER — OMEPRAZOLE 20 MG/1
20 CAPSULE, DELAYED RELEASE ORAL 2 TIMES DAILY
Status: DISCONTINUED | OUTPATIENT
Start: 2022-01-01 | End: 2022-01-01 | Stop reason: HOSPADM

## 2022-01-01 RX ORDER — HEPARIN SODIUM (PORCINE) LOCK FLUSH IV SOLN 100 UNIT/ML 100 UNIT/ML
500 SOLUTION INTRAVENOUS PRN
Status: DISCONTINUED | OUTPATIENT
Start: 2022-01-01 | End: 2022-01-01

## 2022-01-01 RX ORDER — HYDROMORPHONE HYDROCHLORIDE 1 MG/ML
1 INJECTION, SOLUTION INTRAMUSCULAR; INTRAVENOUS; SUBCUTANEOUS
Status: DISCONTINUED | OUTPATIENT
Start: 2022-01-01 | End: 2022-01-01 | Stop reason: HOSPADM

## 2022-01-01 RX ORDER — SODIUM CHLORIDE 9 MG/ML
INJECTION, SOLUTION INTRAVENOUS CONTINUOUS
Status: CANCELLED | OUTPATIENT
Start: 2022-11-05

## 2022-01-01 RX ORDER — ONDANSETRON 2 MG/ML
8 INJECTION INTRAMUSCULAR; INTRAVENOUS EVERY 8 HOURS PRN
Status: DISCONTINUED | OUTPATIENT
Start: 2022-01-01 | End: 2022-01-01 | Stop reason: HOSPADM

## 2022-01-01 RX ORDER — NAPROXEN SODIUM 220 MG
220-440 TABLET ORAL EVERY 6 HOURS PRN
Status: ON HOLD | COMMUNITY
End: 2022-01-01

## 2022-01-01 RX ORDER — SODIUM CHLORIDE 9 MG/ML
1000 INJECTION, SOLUTION INTRAVENOUS ONCE
Status: COMPLETED | OUTPATIENT
Start: 2022-01-01 | End: 2022-01-01

## 2022-01-01 RX ORDER — ONDANSETRON 2 MG/ML
4 INJECTION INTRAMUSCULAR; INTRAVENOUS
Status: DISCONTINUED | OUTPATIENT
Start: 2022-01-01 | End: 2022-01-01 | Stop reason: HOSPADM

## 2022-01-01 RX ORDER — SODIUM CHLORIDE, SODIUM LACTATE, POTASSIUM CHLORIDE, CALCIUM CHLORIDE 600; 310; 30; 20 MG/100ML; MG/100ML; MG/100ML; MG/100ML
INJECTION, SOLUTION INTRAVENOUS CONTINUOUS
Status: ACTIVE | OUTPATIENT
Start: 2022-01-01 | End: 2022-01-01

## 2022-01-01 RX ORDER — HYDRALAZINE HYDROCHLORIDE 20 MG/ML
5 INJECTION INTRAMUSCULAR; INTRAVENOUS
Status: DISCONTINUED | OUTPATIENT
Start: 2022-01-01 | End: 2022-01-01 | Stop reason: HOSPADM

## 2022-01-01 RX ORDER — PROCHLORPERAZINE MALEATE 10 MG
10 TABLET ORAL EVERY 6 HOURS PRN
Status: CANCELLED | OUTPATIENT
Start: 2022-11-05

## 2022-01-01 RX ORDER — PROMETHAZINE HYDROCHLORIDE 25 MG/1
12.5 TABLET ORAL EVERY 6 HOURS PRN
Qty: 30 TABLET | Refills: 0 | Status: SHIPPED | OUTPATIENT
Start: 2022-01-01 | End: 2022-01-01

## 2022-01-01 RX ORDER — ROCURONIUM BROMIDE 10 MG/ML
INJECTION, SOLUTION INTRAVENOUS PRN
Status: DISCONTINUED | OUTPATIENT
Start: 2022-01-01 | End: 2022-01-01 | Stop reason: SURG

## 2022-01-01 RX ORDER — ONDANSETRON 4 MG/1
8 TABLET, ORALLY DISINTEGRATING ORAL EVERY 8 HOURS PRN
Status: DISCONTINUED | OUTPATIENT
Start: 2022-01-01 | End: 2022-01-01 | Stop reason: HOSPADM

## 2022-01-01 RX ORDER — HEPARIN SODIUM (PORCINE) LOCK FLUSH IV SOLN 100 UNIT/ML 100 UNIT/ML
500 SOLUTION INTRAVENOUS PRN
Status: DISCONTINUED | OUTPATIENT
Start: 2022-01-01 | End: 2022-01-01 | Stop reason: HOSPADM

## 2022-01-01 RX ORDER — LOPERAMIDE HYDROCHLORIDE 2 MG/1
2 CAPSULE ORAL
Status: CANCELLED | OUTPATIENT
Start: 2022-01-01

## 2022-01-01 RX ORDER — ONDANSETRON 2 MG/ML
INJECTION INTRAMUSCULAR; INTRAVENOUS PRN
Status: DISCONTINUED | OUTPATIENT
Start: 2022-01-01 | End: 2022-01-01 | Stop reason: SURG

## 2022-01-01 RX ORDER — SUCCINYLCHOLINE CHLORIDE 20 MG/ML
INJECTION INTRAMUSCULAR; INTRAVENOUS PRN
Status: DISCONTINUED | OUTPATIENT
Start: 2022-01-01 | End: 2022-01-01 | Stop reason: SURG

## 2022-01-01 RX ORDER — OXYCODONE HCL 5 MG/5 ML
5 SOLUTION, ORAL ORAL
Status: DISCONTINUED | OUTPATIENT
Start: 2022-01-01 | End: 2022-01-01 | Stop reason: HOSPADM

## 2022-01-01 RX ORDER — MIDAZOLAM HYDROCHLORIDE 1 MG/ML
1 INJECTION INTRAMUSCULAR; INTRAVENOUS
Status: DISCONTINUED | OUTPATIENT
Start: 2022-01-01 | End: 2022-01-01 | Stop reason: HOSPADM

## 2022-01-01 RX ORDER — LIDOCAINE HYDROCHLORIDE 20 MG/ML
INJECTION, SOLUTION EPIDURAL; INFILTRATION; INTRACAUDAL; PERINEURAL PRN
Status: DISCONTINUED | OUTPATIENT
Start: 2022-01-01 | End: 2022-01-01 | Stop reason: SURG

## 2022-01-01 RX ORDER — HYDRALAZINE HYDROCHLORIDE 20 MG/ML
INJECTION INTRAMUSCULAR; INTRAVENOUS PRN
Status: DISCONTINUED | OUTPATIENT
Start: 2022-01-01 | End: 2022-01-01 | Stop reason: SURG

## 2022-01-01 RX ORDER — LORAZEPAM 2 MG/ML
1 CONCENTRATE ORAL
Status: DISCONTINUED | OUTPATIENT
Start: 2022-01-01 | End: 2022-01-01 | Stop reason: HOSPADM

## 2022-01-01 RX ORDER — METHYLPREDNISOLONE SODIUM SUCCINATE 125 MG/2ML
125 INJECTION, POWDER, LYOPHILIZED, FOR SOLUTION INTRAMUSCULAR; INTRAVENOUS PRN
Status: CANCELLED | OUTPATIENT
Start: 2022-01-01

## 2022-01-01 RX ORDER — SODIUM CHLORIDE AND POTASSIUM CHLORIDE 300; 900 MG/100ML; MG/100ML
INJECTION, SOLUTION INTRAVENOUS CONTINUOUS
Status: DISCONTINUED | OUTPATIENT
Start: 2022-01-01 | End: 2022-01-01

## 2022-01-01 RX ORDER — POTASSIUM CHLORIDE 7.45 MG/ML
10 INJECTION INTRAVENOUS
Status: DISCONTINUED | OUTPATIENT
Start: 2022-01-01 | End: 2022-01-01

## 2022-01-01 RX ORDER — METOPROLOL TARTRATE 1 MG/ML
INJECTION, SOLUTION INTRAVENOUS PRN
Status: DISCONTINUED | OUTPATIENT
Start: 2022-01-01 | End: 2022-01-01 | Stop reason: SURG

## 2022-01-01 RX ORDER — POLYETHYLENE GLYCOL 3350 17 G/17G
1 POWDER, FOR SOLUTION ORAL
Status: DISCONTINUED | OUTPATIENT
Start: 2022-01-01 | End: 2022-01-01 | Stop reason: HOSPADM

## 2022-01-01 RX ORDER — DEXAMETHASONE SODIUM PHOSPHATE 4 MG/ML
INJECTION, SOLUTION INTRA-ARTICULAR; INTRALESIONAL; INTRAMUSCULAR; INTRAVENOUS; SOFT TISSUE PRN
Status: DISCONTINUED | OUTPATIENT
Start: 2022-01-01 | End: 2022-01-01 | Stop reason: SURG

## 2022-01-01 RX ORDER — HYDROMORPHONE HYDROCHLORIDE 1 MG/ML
0.2 INJECTION, SOLUTION INTRAMUSCULAR; INTRAVENOUS; SUBCUTANEOUS
Status: DISCONTINUED | OUTPATIENT
Start: 2022-01-01 | End: 2022-01-01 | Stop reason: HOSPADM

## 2022-01-01 RX ORDER — LIDOCAINE AND PRILOCAINE 25; 25 MG/G; MG/G
CREAM TOPICAL
Qty: 30 G | Refills: 6 | Status: SHIPPED | OUTPATIENT
Start: 2022-01-01 | End: 2022-01-01

## 2022-01-01 RX ORDER — POTASSIUM CHLORIDE 20 MEQ/1
40 TABLET, EXTENDED RELEASE ORAL ONCE
Status: COMPLETED | OUTPATIENT
Start: 2022-01-01 | End: 2022-01-01

## 2022-01-01 RX ORDER — HEPARIN SODIUM (PORCINE) LOCK FLUSH IV SOLN 100 UNIT/ML 100 UNIT/ML
300-500 SOLUTION INTRAVENOUS PRN
Status: DISCONTINUED | OUTPATIENT
Start: 2022-01-01 | End: 2022-01-01 | Stop reason: HOSPADM

## 2022-01-01 RX ORDER — LIDOCAINE HYDROCHLORIDE 40 MG/ML
SOLUTION TOPICAL PRN
Status: DISCONTINUED | OUTPATIENT
Start: 2022-01-01 | End: 2022-01-01 | Stop reason: SURG

## 2022-01-01 RX ORDER — OXYCODONE HCL 5 MG/5 ML
10 SOLUTION, ORAL ORAL
Status: DISCONTINUED | OUTPATIENT
Start: 2022-01-01 | End: 2022-01-01 | Stop reason: HOSPADM

## 2022-01-01 RX ORDER — LABETALOL HYDROCHLORIDE 5 MG/ML
INJECTION, SOLUTION INTRAVENOUS PRN
Status: DISCONTINUED | OUTPATIENT
Start: 2022-01-01 | End: 2022-01-01 | Stop reason: SURG

## 2022-01-01 RX ORDER — ROCURONIUM BROMIDE 10 MG/ML
INJECTION, SOLUTION INTRAVENOUS PRN
Status: DISCONTINUED | OUTPATIENT
Start: 2022-01-01 | End: 2022-01-01 | Stop reason: HOSPADM

## 2022-01-01 RX ORDER — ACETAMINOPHEN 500 MG
500-1000 TABLET ORAL EVERY 6 HOURS PRN
COMMUNITY
End: 2022-01-01

## 2022-01-01 RX ORDER — ATROPINE SULFATE 10 MG/ML
2 SOLUTION/ DROPS OPHTHALMIC EVERY 4 HOURS PRN
Status: DISCONTINUED | OUTPATIENT
Start: 2022-01-01 | End: 2022-01-01 | Stop reason: HOSPADM

## 2022-01-01 RX ORDER — 0.9 % SODIUM CHLORIDE 0.9 %
3 VIAL (ML) INJECTION PRN
Status: CANCELLED | OUTPATIENT
Start: 2022-11-05

## 2022-01-01 RX ORDER — LABETALOL HYDROCHLORIDE 5 MG/ML
5 INJECTION, SOLUTION INTRAVENOUS
Status: DISCONTINUED | OUTPATIENT
Start: 2022-01-01 | End: 2022-01-01 | Stop reason: HOSPADM

## 2022-01-01 RX ORDER — POTASSIUM CHLORIDE 7.45 MG/ML
10 INJECTION INTRAVENOUS
Status: COMPLETED | OUTPATIENT
Start: 2022-01-01 | End: 2022-01-01

## 2022-01-01 RX ORDER — ACETAMINOPHEN 650 MG/1
650 SUPPOSITORY RECTAL EVERY 4 HOURS PRN
Status: DISCONTINUED | OUTPATIENT
Start: 2022-01-01 | End: 2022-01-01 | Stop reason: HOSPADM

## 2022-01-01 RX ORDER — ONDANSETRON 2 MG/ML
4 INJECTION INTRAMUSCULAR; INTRAVENOUS EVERY 4 HOURS PRN
Status: DISCONTINUED | OUTPATIENT
Start: 2022-01-01 | End: 2022-01-01

## 2022-01-01 RX ORDER — ONDANSETRON 4 MG/1
4 TABLET, ORALLY DISINTEGRATING ORAL EVERY 4 HOURS PRN
Status: DISCONTINUED | OUTPATIENT
Start: 2022-01-01 | End: 2022-01-01

## 2022-01-01 RX ORDER — AMOXICILLIN AND CLAVULANATE POTASSIUM 400; 57 MG/5ML; MG/5ML
875 POWDER, FOR SUSPENSION ORAL EVERY 12 HOURS
Status: DISCONTINUED | OUTPATIENT
Start: 2022-01-01 | End: 2022-01-01 | Stop reason: HOSPADM

## 2022-01-01 RX ORDER — SODIUM CHLORIDE, SODIUM GLUCONATE, SODIUM ACETATE, POTASSIUM CHLORIDE AND MAGNESIUM CHLORIDE 526; 502; 368; 37; 30 MG/100ML; MG/100ML; MG/100ML; MG/100ML; MG/100ML
500 INJECTION, SOLUTION INTRAVENOUS CONTINUOUS
Status: DISCONTINUED | OUTPATIENT
Start: 2022-01-01 | End: 2022-01-01 | Stop reason: HOSPADM

## 2022-01-01 RX ORDER — PROCHLORPERAZINE MALEATE 10 MG
10 TABLET ORAL EVERY 6 HOURS PRN
Status: DISCONTINUED | OUTPATIENT
Start: 2022-01-01 | End: 2022-01-01 | Stop reason: HOSPADM

## 2022-01-01 RX ORDER — IPRATROPIUM BROMIDE AND ALBUTEROL SULFATE 2.5; .5 MG/3ML; MG/3ML
3 SOLUTION RESPIRATORY (INHALATION)
Status: DISCONTINUED | OUTPATIENT
Start: 2022-01-01 | End: 2022-01-01 | Stop reason: HOSPADM

## 2022-01-01 RX ORDER — 0.9 % SODIUM CHLORIDE 0.9 %
20 VIAL (ML) INJECTION PRN
Status: CANCELLED | OUTPATIENT
Start: 2022-01-01

## 2022-01-01 RX ORDER — ONDANSETRON 2 MG/ML
8 INJECTION INTRAMUSCULAR; INTRAVENOUS ONCE
Status: CANCELLED | OUTPATIENT
Start: 2022-11-05 | End: 2022-11-07

## 2022-01-01 RX ORDER — LORAZEPAM 2 MG/ML
1 INJECTION INTRAMUSCULAR
Status: DISCONTINUED | OUTPATIENT
Start: 2022-01-01 | End: 2022-01-01 | Stop reason: HOSPADM

## 2022-01-01 RX ORDER — ONDANSETRON 2 MG/ML
8 INJECTION INTRAMUSCULAR; INTRAVENOUS ONCE
Status: CANCELLED | OUTPATIENT
Start: 2022-01-01 | End: 2022-01-01

## 2022-01-01 RX ORDER — TRAMADOL HYDROCHLORIDE 50 MG/1
50 TABLET ORAL EVERY 4 HOURS PRN
Qty: 50 TABLET | Refills: 0 | Status: SHIPPED | OUTPATIENT
Start: 2022-01-01 | End: 2022-01-01

## 2022-01-01 RX ORDER — SODIUM CHLORIDE 9 MG/ML
INJECTION, SOLUTION INTRAVENOUS CONTINUOUS
Status: DISCONTINUED | OUTPATIENT
Start: 2022-01-01 | End: 2022-01-01

## 2022-01-01 RX ORDER — MEPERIDINE HYDROCHLORIDE 25 MG/ML
12.5 INJECTION INTRAMUSCULAR; INTRAVENOUS; SUBCUTANEOUS
Status: DISCONTINUED | OUTPATIENT
Start: 2022-01-01 | End: 2022-01-01 | Stop reason: HOSPADM

## 2022-01-01 RX ORDER — ACETAMINOPHEN 325 MG/1
650 TABLET ORAL EVERY 6 HOURS PRN
Status: DISCONTINUED | OUTPATIENT
Start: 2022-01-01 | End: 2022-01-01

## 2022-01-01 RX ORDER — HYDROMORPHONE HYDROCHLORIDE 1 MG/ML
0.1 INJECTION, SOLUTION INTRAMUSCULAR; INTRAVENOUS; SUBCUTANEOUS
Status: DISCONTINUED | OUTPATIENT
Start: 2022-01-01 | End: 2022-01-01 | Stop reason: HOSPADM

## 2022-01-01 RX ORDER — ONDANSETRON 8 MG/1
8 TABLET, ORALLY DISINTEGRATING ORAL PRN
Status: CANCELLED | OUTPATIENT
Start: 2022-11-05

## 2022-01-01 RX ORDER — CELECOXIB 200 MG/1
200 CAPSULE ORAL 2 TIMES DAILY
Qty: 60 CAPSULE | Refills: 1 | Status: SHIPPED | OUTPATIENT
Start: 2022-01-01 | End: 2022-01-01

## 2022-01-01 RX ORDER — KETOROLAC TROMETHAMINE 30 MG/ML
15 INJECTION, SOLUTION INTRAMUSCULAR; INTRAVENOUS EVERY 6 HOURS PRN
Status: DISPENSED | OUTPATIENT
Start: 2022-01-01 | End: 2022-01-01

## 2022-01-01 RX ORDER — LOPERAMIDE HYDROCHLORIDE 2 MG/1
2 CAPSULE ORAL PRN
Qty: 30 CAPSULE | Refills: 6 | Status: SHIPPED | OUTPATIENT
Start: 2022-01-01 | End: 2022-01-01

## 2022-01-01 RX ORDER — 0.9 % SODIUM CHLORIDE 0.9 %
VIAL (ML) INJECTION PRN
Status: CANCELLED | OUTPATIENT
Start: 2022-11-05

## 2022-01-01 RX ORDER — ONDANSETRON 4 MG/1
4 TABLET, FILM COATED ORAL EVERY 4 HOURS PRN
Qty: 30 TABLET | Refills: 6 | Status: SHIPPED | OUTPATIENT
Start: 2022-01-01 | End: 2022-01-01

## 2022-01-01 RX ORDER — PROCHLORPERAZINE MALEATE 10 MG
10 TABLET ORAL EVERY 6 HOURS PRN
Qty: 30 TABLET | Refills: 6 | Status: SHIPPED | OUTPATIENT
Start: 2022-01-01 | End: 2022-01-01

## 2022-01-01 RX ORDER — POTASSIUM CHLORIDE 20 MEQ/1
20 TABLET, EXTENDED RELEASE ORAL ONCE
Status: COMPLETED | OUTPATIENT
Start: 2022-01-01 | End: 2022-01-01

## 2022-01-01 RX ORDER — BISACODYL 10 MG
10 SUPPOSITORY, RECTAL RECTAL
Status: DISCONTINUED | OUTPATIENT
Start: 2022-01-01 | End: 2022-01-01 | Stop reason: HOSPADM

## 2022-01-01 RX ORDER — NAPROXEN 250 MG/1
250 TABLET ORAL 4 TIMES DAILY PRN
Status: DISCONTINUED | OUTPATIENT
Start: 2022-01-01 | End: 2022-01-01 | Stop reason: HOSPADM

## 2022-01-01 RX ORDER — MEPERIDINE HYDROCHLORIDE 25 MG/ML
6.25 INJECTION INTRAMUSCULAR; INTRAVENOUS; SUBCUTANEOUS
Status: DISCONTINUED | OUTPATIENT
Start: 2022-01-01 | End: 2022-01-01 | Stop reason: HOSPADM

## 2022-01-01 RX ORDER — IBUPROFEN 200 MG
200 TABLET ORAL EVERY 6 HOURS PRN
COMMUNITY
End: 2022-01-01

## 2022-01-01 RX ADMIN — OXALIPLATIN 126.5 MG: 5 INJECTION, SOLUTION INTRAVENOUS at 14:41

## 2022-01-01 RX ADMIN — IOHEXOL 90 ML: 350 INJECTION, SOLUTION INTRAVENOUS at 14:20

## 2022-01-01 RX ADMIN — PIPERACILLIN SODIUM AND TAZOBACTAM SODIUM 3.38 G: 3; 375 INJECTION, POWDER, FOR SOLUTION INTRAVENOUS at 20:44

## 2022-01-01 RX ADMIN — HEPARIN 500 UNITS: 100 SYRINGE at 16:50

## 2022-01-01 RX ADMIN — PROPOFOL 150 MG: 10 INJECTION, EMULSION INTRAVENOUS at 07:54

## 2022-01-01 RX ADMIN — ONDANSETRON HYDROCHLORIDE 16 MG: 2 INJECTION, SOLUTION INTRAMUSCULAR; INTRAVENOUS at 13:55

## 2022-01-01 RX ADMIN — PROPOFOL 50 MG: 10 INJECTION, EMULSION INTRAVENOUS at 10:27

## 2022-01-01 RX ADMIN — SENNOSIDES AND DOCUSATE SODIUM 2 TABLET: 50; 8.6 TABLET ORAL at 16:08

## 2022-01-01 RX ADMIN — HEPARIN 500 UNITS: 100 SYRINGE at 18:30

## 2022-01-01 RX ADMIN — HEPARIN 500 UNITS: 100 SYRINGE at 10:29

## 2022-01-01 RX ADMIN — LIDOCAINE HYDROCHLORIDE 40 MG: 20 INJECTION, SOLUTION EPIDURAL; INFILTRATION; INTRACAUDAL at 08:03

## 2022-01-01 RX ADMIN — ACETAMINOPHEN 650 MG: 325 TABLET, FILM COATED ORAL at 21:46

## 2022-01-01 RX ADMIN — ACETAMINOPHEN 650 MG: 325 TABLET, FILM COATED ORAL at 01:08

## 2022-01-01 RX ADMIN — ROCURONIUM BROMIDE 50 MG: 10 INJECTION, SOLUTION INTRAVENOUS at 08:03

## 2022-01-01 RX ADMIN — ONDANSETRON HYDROCHLORIDE 16 MG: 2 INJECTION, SOLUTION INTRAMUSCULAR; INTRAVENOUS at 13:03

## 2022-01-01 RX ADMIN — ATROPINE SULFATE 0.5 MG: 1 INJECTION, SOLUTION INTRAMUSCULAR; INTRAVENOUS; SUBCUTANEOUS at 17:22

## 2022-01-01 RX ADMIN — IRINOTECAN HYDROCHLORIDE 208.6 MG: 20 INJECTION, SOLUTION INTRAVENOUS at 15:10

## 2022-01-01 RX ADMIN — PROPOFOL 40 MG: 10 INJECTION, EMULSION INTRAVENOUS at 08:46

## 2022-01-01 RX ADMIN — OXALIPLATIN 89.5 MG: 100 INJECTION, SOLUTION, CONCENTRATE INTRAVENOUS at 12:10

## 2022-01-01 RX ADMIN — PIPERACILLIN AND TAZOBACTAM 4.5 G: 4; .5 INJECTION, POWDER, LYOPHILIZED, FOR SOLUTION INTRAVENOUS; PARENTERAL at 13:49

## 2022-01-01 RX ADMIN — DEXAMETHASONE SODIUM PHOSPHATE 4 MG: 4 INJECTION, SOLUTION INTRA-ARTICULAR; INTRALESIONAL; INTRAMUSCULAR; INTRAVENOUS; SOFT TISSUE at 07:58

## 2022-01-01 RX ADMIN — HEPARIN 500 UNITS: 100 SYRINGE at 17:05

## 2022-01-01 RX ADMIN — KETOROLAC TROMETHAMINE 30 MG: 30 INJECTION, SOLUTION INTRAMUSCULAR at 08:35

## 2022-01-01 RX ADMIN — ALTEPLASE 2 MG: 2.2 INJECTION, POWDER, LYOPHILIZED, FOR SOLUTION INTRAVENOUS at 10:27

## 2022-01-01 RX ADMIN — SODIUM CHLORIDE: 9 INJECTION, SOLUTION INTRAVENOUS at 02:59

## 2022-01-01 RX ADMIN — OMEPRAZOLE 20 MG: 20 CAPSULE, DELAYED RELEASE ORAL at 05:09

## 2022-01-01 RX ADMIN — SENNOSIDES AND DOCUSATE SODIUM 2 TABLET: 50; 8.6 TABLET ORAL at 05:05

## 2022-01-01 RX ADMIN — PIPERACILLIN SODIUM AND TAZOBACTAM SODIUM 3.38 G: 3; 375 INJECTION, POWDER, FOR SOLUTION INTRAVENOUS at 14:19

## 2022-01-01 RX ADMIN — PROCHLORPERAZINE MALEATE 10 MG: 10 TABLET ORAL at 17:22

## 2022-01-01 RX ADMIN — FLUOROURACIL 3335 MG: 50 INJECTION, SOLUTION INTRAVENOUS at 17:03

## 2022-01-01 RX ADMIN — HYDRALAZINE HYDROCHLORIDE 5 MG: 20 INJECTION INTRAMUSCULAR; INTRAVENOUS at 10:35

## 2022-01-01 RX ADMIN — SUCCINYLCHOLINE CHLORIDE 100 MG: 20 INJECTION, SOLUTION INTRAMUSCULAR; INTRAVENOUS at 10:22

## 2022-01-01 RX ADMIN — SENNOSIDES AND DOCUSATE SODIUM 2 TABLET: 50; 8.6 TABLET ORAL at 05:01

## 2022-01-01 RX ADMIN — ONDANSETRON 4 MG: 2 INJECTION INTRAMUSCULAR; INTRAVENOUS at 08:35

## 2022-01-01 RX ADMIN — OMEPRAZOLE 20 MG: 20 CAPSULE, DELAYED RELEASE ORAL at 05:05

## 2022-01-01 RX ADMIN — PIPERACILLIN SODIUM AND TAZOBACTAM SODIUM 3.38 G: 3; 375 INJECTION, POWDER, FOR SOLUTION INTRAVENOUS at 20:46

## 2022-01-01 RX ADMIN — FLUOROURACIL 3575 MG: 50 INJECTION, SOLUTION INTRAVENOUS at 18:36

## 2022-01-01 RX ADMIN — SODIUM CHLORIDE: 9 INJECTION, SOLUTION INTRAVENOUS at 16:00

## 2022-01-01 RX ADMIN — POTASSIUM CHLORIDE 10 MEQ: 7.46 INJECTION, SOLUTION INTRAVENOUS at 15:15

## 2022-01-01 RX ADMIN — HEPARIN 500 UNITS: 100 SYRINGE at 17:12

## 2022-01-01 RX ADMIN — PIPERACILLIN SODIUM AND TAZOBACTAM SODIUM 3.38 G: 3; 375 INJECTION, POWDER, FOR SOLUTION INTRAVENOUS at 20:21

## 2022-01-01 RX ADMIN — DEXAMETHASONE SODIUM PHOSPHATE 12 MG: 4 INJECTION, SOLUTION INTRA-ARTICULAR; INTRALESIONAL; INTRAMUSCULAR; INTRAVENOUS; SOFT TISSUE at 11:25

## 2022-01-01 RX ADMIN — DEXAMETHASONE SODIUM PHOSPHATE 12 MG: 4 INJECTION, SOLUTION INTRA-ARTICULAR; INTRALESIONAL; INTRAMUSCULAR; INTRAVENOUS; SOFT TISSUE at 11:50

## 2022-01-01 RX ADMIN — PIPERACILLIN SODIUM AND TAZOBACTAM SODIUM 3.38 G: 3; 375 INJECTION, POWDER, FOR SOLUTION INTRAVENOUS at 18:08

## 2022-01-01 RX ADMIN — FLUOROURACIL 3310 MG: 50 INJECTION, SOLUTION INTRAVENOUS at 16:20

## 2022-01-01 RX ADMIN — HEPARIN SODIUM (PORCINE) LOCK FLUSH IV SOLN 100 UNIT/ML 500 UNITS: 100 SOLUTION at 18:30

## 2022-01-01 RX ADMIN — ATROPINE SULFATE 0.5 MG: 1 INJECTION, SOLUTION INTRAMUSCULAR; INTRAVENOUS; SUBCUTANEOUS at 16:51

## 2022-01-01 RX ADMIN — ATROPINE SULFATE 0.5 MG: 1 INJECTION, SOLUTION INTRAMUSCULAR; INTRAVENOUS; SUBCUTANEOUS at 15:02

## 2022-01-01 RX ADMIN — ROCURONIUM BROMIDE 30 MG: 10 INJECTION, SOLUTION INTRAVENOUS at 07:54

## 2022-01-01 RX ADMIN — FLUOROURACIL 3430 MG: 50 INJECTION, SOLUTION INTRAVENOUS at 18:02

## 2022-01-01 RX ADMIN — POTASSIUM CHLORIDE AND SODIUM CHLORIDE: 900; 300 INJECTION, SOLUTION INTRAVENOUS at 14:02

## 2022-01-01 RX ADMIN — MIDAZOLAM 1 MG: 1 INJECTION INTRAMUSCULAR; INTRAVENOUS at 07:55

## 2022-01-01 RX ADMIN — PIPERACILLIN SODIUM AND TAZOBACTAM SODIUM 3.38 G: 3; 375 INJECTION, POWDER, FOR SOLUTION INTRAVENOUS at 05:09

## 2022-01-01 RX ADMIN — LEUCOVORIN CALCIUM 556 MG: 100 INJECTION, POWDER, LYOPHILIZED, FOR SUSPENSION INTRAMUSCULAR; INTRAVENOUS at 15:10

## 2022-01-01 RX ADMIN — LEUCOVORIN CALCIUM 572 MG: 500 INJECTION, POWDER, LYOPHILIZED, FOR SOLUTION INTRAMUSCULAR; INTRAVENOUS at 16:15

## 2022-01-01 RX ADMIN — SENNOSIDES AND DOCUSATE SODIUM 2 TABLET: 50; 8.6 TABLET ORAL at 16:51

## 2022-01-01 RX ADMIN — PIPERACILLIN SODIUM AND TAZOBACTAM SODIUM 3.38 G: 3; 375 INJECTION, POWDER, FOR SOLUTION INTRAVENOUS at 12:39

## 2022-01-01 RX ADMIN — PEGFILGRASTIM 6 MG: KIT SUBCUTANEOUS at 17:05

## 2022-01-01 RX ADMIN — ACETAMINOPHEN 650 MG: 325 TABLET, FILM COATED ORAL at 20:26

## 2022-01-01 RX ADMIN — OMEPRAZOLE 20 MG: 20 CAPSULE, DELAYED RELEASE ORAL at 17:03

## 2022-01-01 RX ADMIN — HEPARIN 500 UNITS: 100 SYRINGE at 15:42

## 2022-01-01 RX ADMIN — ACETAMINOPHEN 650 MG: 325 TABLET, FILM COATED ORAL at 06:07

## 2022-01-01 RX ADMIN — POTASSIUM CHLORIDE 10 MEQ: 7.46 INJECTION, SOLUTION INTRAVENOUS at 14:10

## 2022-01-01 RX ADMIN — IRINOTECAN HYDROCHLORIDE 207 MG: 20 INJECTION, SOLUTION INTRAVENOUS at 14:40

## 2022-01-01 RX ADMIN — LIDOCAINE HYDROCHLORIDE 50 MG: 20 INJECTION, SOLUTION EPIDURAL; INFILTRATION; INTRACAUDAL at 07:54

## 2022-01-01 RX ADMIN — SODIUM CHLORIDE: 9 INJECTION, SOLUTION INTRAVENOUS at 04:20

## 2022-01-01 RX ADMIN — PIPERACILLIN SODIUM AND TAZOBACTAM SODIUM 3.38 G: 3; 375 INJECTION, POWDER, FOR SOLUTION INTRAVENOUS at 20:29

## 2022-01-01 RX ADMIN — HALOPERIDOL LACTATE 1 MG: 5 INJECTION, SOLUTION INTRAMUSCULAR at 12:08

## 2022-01-01 RX ADMIN — PIPERACILLIN SODIUM AND TAZOBACTAM SODIUM 3.38 G: 3; 375 INJECTION, POWDER, FOR SOLUTION INTRAVENOUS at 05:32

## 2022-01-01 RX ADMIN — ONDANSETRON HYDROCHLORIDE 16 MG: 2 INJECTION, SOLUTION INTRAMUSCULAR; INTRAVENOUS at 11:40

## 2022-01-01 RX ADMIN — PIPERACILLIN SODIUM AND TAZOBACTAM SODIUM 3.38 G: 3; 375 INJECTION, POWDER, FOR SOLUTION INTRAVENOUS at 13:00

## 2022-01-01 RX ADMIN — DEXAMETHASONE SODIUM PHOSPHATE 12 MG: 4 INJECTION, SOLUTION INTRA-ARTICULAR; INTRALESIONAL; INTRAMUSCULAR; INTRAVENOUS; SOFT TISSUE at 13:42

## 2022-01-01 RX ADMIN — HEPARIN SODIUM (PORCINE) LOCK FLUSH IV SOLN 100 UNIT/ML 500 UNITS: 100 SOLUTION at 15:42

## 2022-01-01 RX ADMIN — PIPERACILLIN SODIUM AND TAZOBACTAM SODIUM 3.38 G: 3; 375 INJECTION, POWDER, FOR SOLUTION INTRAVENOUS at 12:11

## 2022-01-01 RX ADMIN — PEGFILGRASTIM 6 MG: KIT SUBCUTANEOUS at 17:16

## 2022-01-01 RX ADMIN — SODIUM CHLORIDE, POTASSIUM CHLORIDE, SODIUM LACTATE AND CALCIUM CHLORIDE: 600; 310; 30; 20 INJECTION, SOLUTION INTRAVENOUS at 07:54

## 2022-01-01 RX ADMIN — POTASSIUM CHLORIDE 40 MEQ: 1500 TABLET, EXTENDED RELEASE ORAL at 11:25

## 2022-01-01 RX ADMIN — SUGAMMADEX 200 MG: 100 INJECTION, SOLUTION INTRAVENOUS at 08:35

## 2022-01-01 RX ADMIN — SODIUM CHLORIDE, POTASSIUM CHLORIDE, SODIUM LACTATE AND CALCIUM CHLORIDE: 600; 310; 30; 20 INJECTION, SOLUTION INTRAVENOUS at 09:04

## 2022-01-01 RX ADMIN — OMEPRAZOLE 20 MG: 20 CAPSULE, DELAYED RELEASE ORAL at 17:39

## 2022-01-01 RX ADMIN — DEXAMETHASONE SODIUM PHOSPHATE 8 MG: 4 INJECTION, SOLUTION INTRA-ARTICULAR; INTRALESIONAL; INTRAMUSCULAR; INTRAVENOUS; SOFT TISSUE at 08:06

## 2022-01-01 RX ADMIN — ALTEPLASE 2 MG: 2.2 INJECTION, POWDER, LYOPHILIZED, FOR SOLUTION INTRAVENOUS at 12:56

## 2022-01-01 RX ADMIN — HEPARIN 500 UNITS: 100 SYRINGE at 17:11

## 2022-01-01 RX ADMIN — SODIUM CHLORIDE 1000 ML: 9 INJECTION, SOLUTION INTRAVENOUS at 15:46

## 2022-01-01 RX ADMIN — PIPERACILLIN SODIUM AND TAZOBACTAM SODIUM 3.38 G: 3; 375 INJECTION, POWDER, FOR SOLUTION INTRAVENOUS at 05:05

## 2022-01-01 RX ADMIN — FUROSEMIDE 20 MG: 10 INJECTION, SOLUTION INTRAMUSCULAR; INTRAVENOUS at 12:35

## 2022-01-01 RX ADMIN — PROPOFOL 150 MG: 10 INJECTION, EMULSION INTRAVENOUS at 10:22

## 2022-01-01 RX ADMIN — PEGFILGRASTIM 6 MG: KIT SUBCUTANEOUS at 17:11

## 2022-01-01 RX ADMIN — OXALIPLATIN 121.5 MG: 5 INJECTION, SOLUTION INTRAVENOUS at 13:50

## 2022-01-01 RX ADMIN — DEXAMETHASONE SODIUM PHOSPHATE 12 MG: 4 INJECTION, SOLUTION INTRA-ARTICULAR; INTRALESIONAL; INTRAMUSCULAR; INTRAVENOUS; SOFT TISSUE at 12:50

## 2022-01-01 RX ADMIN — FENTANYL CITRATE 50 MCG: 50 INJECTION, SOLUTION INTRAMUSCULAR; INTRAVENOUS at 07:55

## 2022-01-01 RX ADMIN — PIPERACILLIN AND TAZOBACTAM 3.38 G: 3; .375 INJECTION, POWDER, LYOPHILIZED, FOR SOLUTION INTRAVENOUS; PARENTERAL at 10:23

## 2022-01-01 RX ADMIN — OMEPRAZOLE 20 MG: 20 CAPSULE, DELAYED RELEASE ORAL at 05:14

## 2022-01-01 RX ADMIN — OXALIPLATIN 90.5 MG: 100 INJECTION, SOLUTION, CONCENTRATE INTRAVENOUS at 12:35

## 2022-01-01 RX ADMIN — OMEPRAZOLE 20 MG: 20 CAPSULE, DELAYED RELEASE ORAL at 16:51

## 2022-01-01 RX ADMIN — MORPHINE SULFATE 5 MG: 10 INJECTION INTRAVENOUS at 16:17

## 2022-01-01 RX ADMIN — ONDANSETRON 16 MG: 2 INJECTION INTRAMUSCULAR; INTRAVENOUS at 12:05

## 2022-01-01 RX ADMIN — ONDANSETRON 4 MG: 2 INJECTION INTRAMUSCULAR; INTRAVENOUS at 08:16

## 2022-01-01 RX ADMIN — DEXAMETHASONE SODIUM PHOSPHATE 12 MG: 4 INJECTION, SOLUTION INTRA-ARTICULAR; INTRALESIONAL; INTRAMUSCULAR; INTRAVENOUS; SOFT TISSUE at 13:44

## 2022-01-01 RX ADMIN — CEFAZOLIN 2 G: 330 INJECTION, POWDER, FOR SOLUTION INTRAMUSCULAR; INTRAVENOUS at 07:56

## 2022-01-01 RX ADMIN — LEUCOVORIN CALCIUM 584 MG: 100 INJECTION, POWDER, LYOPHILIZED, FOR SUSPENSION INTRAMUSCULAR; INTRAVENOUS at 17:00

## 2022-01-01 RX ADMIN — LABETALOL HYDROCHLORIDE 10 MG: 5 INJECTION, SOLUTION INTRAVENOUS at 08:21

## 2022-01-01 RX ADMIN — METOPROLOL TARTRATE 1 MG: 5 INJECTION INTRAVENOUS at 10:39

## 2022-01-01 RX ADMIN — IOHEXOL 80 ML: 350 INJECTION, SOLUTION INTRAVENOUS at 14:47

## 2022-01-01 RX ADMIN — ATROPINE SULFATE 0.5 MG: 1 INJECTION, SOLUTION INTRAMUSCULAR; INTRAVENOUS; SUBCUTANEOUS at 14:35

## 2022-01-01 RX ADMIN — POTASSIUM CHLORIDE 10 MEQ: 7.46 INJECTION, SOLUTION INTRAVENOUS at 14:39

## 2022-01-01 RX ADMIN — SENNOSIDES AND DOCUSATE SODIUM 2 TABLET: 50; 8.6 TABLET ORAL at 17:39

## 2022-01-01 RX ADMIN — KETOROLAC TROMETHAMINE 15 MG: 30 INJECTION, SOLUTION INTRAMUSCULAR; INTRAVENOUS at 01:18

## 2022-01-01 RX ADMIN — PIPERACILLIN SODIUM AND TAZOBACTAM SODIUM 3.38 G: 3; 375 INJECTION, POWDER, FOR SOLUTION INTRAVENOUS at 05:16

## 2022-01-01 RX ADMIN — LEUCOVORIN CALCIUM 596 MG: 100 INJECTION, POWDER, LYOPHILIZED, FOR SUSPENSION INTRAMUSCULAR; INTRAVENOUS at 16:53

## 2022-01-01 RX ADMIN — IOHEXOL 100 ML: 350 INJECTION, SOLUTION INTRAVENOUS at 16:33

## 2022-01-01 RX ADMIN — ONDANSETRON HYDROCHLORIDE 16 MG: 2 INJECTION, SOLUTION INTRAMUSCULAR; INTRAVENOUS at 13:56

## 2022-01-01 RX ADMIN — OMEPRAZOLE 20 MG: 20 CAPSULE, DELAYED RELEASE ORAL at 05:01

## 2022-01-01 RX ADMIN — IRINOTECAN HYDROCHLORIDE 214.6 MG: 20 INJECTION, SOLUTION INTRAVENOUS at 16:15

## 2022-01-01 RX ADMIN — ROCURONIUM BROMIDE 5 MG: 10 INJECTION, SOLUTION INTRAVENOUS at 10:22

## 2022-01-01 RX ADMIN — OMEPRAZOLE 20 MG: 20 CAPSULE, DELAYED RELEASE ORAL at 18:33

## 2022-01-01 RX ADMIN — SUGAMMADEX 200 MG: 100 INJECTION, SOLUTION INTRAVENOUS at 08:20

## 2022-01-01 RX ADMIN — POTASSIUM CHLORIDE 10 MEQ: 7.46 INJECTION, SOLUTION INTRAVENOUS at 17:29

## 2022-01-01 RX ADMIN — SODIUM CHLORIDE, POTASSIUM CHLORIDE, SODIUM LACTATE AND CALCIUM CHLORIDE: 600; 310; 30; 20 INJECTION, SOLUTION INTRAVENOUS at 07:42

## 2022-01-01 RX ADMIN — ACETAMINOPHEN 650 MG: 325 TABLET, FILM COATED ORAL at 19:23

## 2022-01-01 RX ADMIN — IRINOTECAN HYDROCHLORIDE 223.6 MG: 20 INJECTION, SOLUTION INTRAVENOUS at 16:53

## 2022-01-01 RX ADMIN — FLUOROURACIL 3505 MG: 50 INJECTION, SOLUTION INTRAVENOUS at 18:50

## 2022-01-01 RX ADMIN — DEXTROSE MONOHYDRATE: 50 INJECTION, SOLUTION INTRAVENOUS at 12:48

## 2022-01-01 RX ADMIN — PIPERACILLIN SODIUM AND TAZOBACTAM SODIUM 3.38 G: 3; 375 INJECTION, POWDER, FOR SOLUTION INTRAVENOUS at 04:21

## 2022-01-01 RX ADMIN — HYDRALAZINE HYDROCHLORIDE 5 MG: 20 INJECTION INTRAMUSCULAR; INTRAVENOUS at 10:31

## 2022-01-01 RX ADMIN — POTASSIUM CHLORIDE 10 MEQ: 7.46 INJECTION, SOLUTION INTRAVENOUS at 13:43

## 2022-01-01 RX ADMIN — POTASSIUM CHLORIDE 10 MEQ: 7.46 INJECTION, SOLUTION INTRAVENOUS at 16:20

## 2022-01-01 RX ADMIN — OXALIPLATIN 124 MG: 5 INJECTION, SOLUTION INTRAVENOUS at 14:27

## 2022-01-01 RX ADMIN — LIDOCAINE HYDROCHLORIDE 4 ML: 40 SOLUTION TOPICAL at 07:54

## 2022-01-01 RX ADMIN — PROPOFOL 100 MG: 10 INJECTION, EMULSION INTRAVENOUS at 08:03

## 2022-01-01 RX ADMIN — PEGFILGRASTIM 6 MG: KIT SUBCUTANEOUS at 16:55

## 2022-01-01 RX ADMIN — SODIUM CHLORIDE: 9 INJECTION, SOLUTION INTRAVENOUS at 19:23

## 2022-01-01 RX ADMIN — IRINOTECAN HYDROCHLORIDE 219 MG: 20 INJECTION, SOLUTION INTRAVENOUS at 17:00

## 2022-01-01 RX ADMIN — ATROPINE SULFATE 0.5 MG: 1 INJECTION, SOLUTION INTRAMUSCULAR; INTRAVENOUS; SUBCUTANEOUS at 16:57

## 2022-01-01 RX ADMIN — POLYETHYLENE GLYCOL 3350 1 PACKET: 17 POWDER, FOR SOLUTION ORAL at 05:05

## 2022-01-01 RX ADMIN — LEUCOVORIN CALCIUM 552 MG: 350 INJECTION, POWDER, LYOPHILIZED, FOR SUSPENSION INTRAMUSCULAR; INTRAVENOUS at 14:40

## 2022-01-01 RX ADMIN — PIPERACILLIN SODIUM AND TAZOBACTAM SODIUM 3.38 G: 3; 375 INJECTION, POWDER, FOR SOLUTION INTRAVENOUS at 14:08

## 2022-01-01 RX ADMIN — POTASSIUM CHLORIDE 20 MEQ: 1500 TABLET, EXTENDED RELEASE ORAL at 13:38

## 2022-01-01 ASSESSMENT — LIFESTYLE VARIABLES
HAVE YOU EVER FELT YOU SHOULD CUT DOWN ON YOUR DRINKING: NO
HAVE PEOPLE ANNOYED YOU BY CRITICIZING YOUR DRINKING: NO
AVERAGE NUMBER OF DAYS PER WEEK YOU HAVE A DRINK CONTAINING ALCOHOL: 0
CONSUMPTION TOTAL: NEGATIVE
TOTAL SCORE: 0
TOTAL SCORE: 0
ALCOHOL_USE: NO
TOTAL SCORE: 0
ON A TYPICAL DAY WHEN YOU DRINK ALCOHOL HOW MANY DRINKS DO YOU HAVE: 0
HOW MANY TIMES IN THE PAST YEAR HAVE YOU HAD 5 OR MORE DRINKS IN A DAY: 0
EVER HAD A DRINK FIRST THING IN THE MORNING TO STEADY YOUR NERVES TO GET RID OF A HANGOVER: NO
EVER FELT BAD OR GUILTY ABOUT YOUR DRINKING: NO

## 2022-01-01 ASSESSMENT — PAIN SCALES - GENERAL
PAINLEVEL: NO PAIN
PAIN_LEVEL: 0
PAIN_LEVEL: 0
PAINLEVEL: 3=SLIGHT PAIN
PAINLEVEL: 2=MINIMAL-SLIGHT
PAIN_LEVEL: 4
PAINLEVEL: 4=SLIGHT-MODERATE PAIN

## 2022-01-01 ASSESSMENT — ENCOUNTER SYMPTOMS
DIARRHEA: 0
DIARRHEA: 0
BLURRED VISION: 0
MYALGIAS: 0
TINGLING: 1
WEIGHT LOSS: 1
SINUS PAIN: 0
DIARRHEA: 0
WHEEZING: 0
HEADACHES: 0
VOMITING: 0
WHEEZING: 0
INSOMNIA: 0
WHEEZING: 0
CONSTIPATION: 1
VOMITING: 0
NERVOUS/ANXIOUS: 0
MYALGIAS: 0
MYALGIAS: 0
BRUISES/BLEEDS EASILY: 0
NAUSEA: 1
WEAKNESS: 0
FEVER: 0
COUGH: 0
WEAKNESS: 0
DOUBLE VISION: 0
PALPITATIONS: 0
NAUSEA: 0
PALPITATIONS: 0
WHEEZING: 0
CONSTIPATION: 0
PALPITATIONS: 0
COUGH: 1
ABDOMINAL PAIN: 1
TINGLING: 0
BACK PAIN: 1
SHORTNESS OF BREATH: 0
COUGH: 0
WEAKNESS: 1
PALPITATIONS: 0
HEARTBURN: 0
CHILLS: 0
DIZZINESS: 0
BLOOD IN STOOL: 0
COUGH: 0
PALPITATIONS: 0
BLOOD IN STOOL: 0
WEIGHT LOSS: 1
ROS SKIN COMMENTS: JAUNDICE
FEVER: 0
BLOOD IN STOOL: 0
ORTHOPNEA: 0
WEIGHT LOSS: 1
HEADACHES: 0
HEMOPTYSIS: 0
CHILLS: 0
VOMITING: 0
EYES NEGATIVE: 1
TINGLING: 1
HEARTBURN: 0
CHILLS: 0
SPUTUM PRODUCTION: 0
BACK PAIN: 1
NAUSEA: 0
COUGH: 0
BRUISES/BLEEDS EASILY: 0
WEIGHT LOSS: 0
NAUSEA: 1
DOUBLE VISION: 0
NAUSEA: 1
ORTHOPNEA: 0
SHORTNESS OF BREATH: 0
WEIGHT LOSS: 1
SPUTUM PRODUCTION: 0
MYALGIAS: 0
SORE THROAT: 0
DIZZINESS: 0
TINGLING: 1
DIAPHORESIS: 0
DIARRHEA: 0
WEIGHT LOSS: 1
COUGH: 0
NAUSEA: 1
DIZZINESS: 0
HEARTBURN: 0
NAUSEA: 0
BRUISES/BLEEDS EASILY: 0
CHILLS: 0
BLURRED VISION: 0
HEARTBURN: 0
CHILLS: 0
HEADACHES: 0
NAUSEA: 0
HEMOPTYSIS: 0
CHILLS: 0
SHORTNESS OF BREATH: 0
WEAKNESS: 1
HEMOPTYSIS: 0
PALPITATIONS: 0
SPUTUM PRODUCTION: 0
WEAKNESS: 1
WHEEZING: 0
WEAKNESS: 0
CHILLS: 0
WEIGHT LOSS: 0
FEVER: 0
ORTHOPNEA: 0
MYALGIAS: 0
ORTHOPNEA: 0
DOUBLE VISION: 0
ABDOMINAL PAIN: 1
WEIGHT LOSS: 1
ABDOMINAL PAIN: 1
DIZZINESS: 0
BRUISES/BLEEDS EASILY: 0
CONSTIPATION: 0
DIAPHORESIS: 0
VOMITING: 0
NAUSEA: 0
HEADACHES: 0
BACK PAIN: 1
NAUSEA: 1
COUGH: 0
PALPITATIONS: 0
CHILLS: 0
NAUSEA: 0
SPUTUM PRODUCTION: 0
SPUTUM PRODUCTION: 0
ABDOMINAL PAIN: 1
INSOMNIA: 0
INSOMNIA: 0
HEADACHES: 0
HEADACHES: 0
WEAKNESS: 0
ABDOMINAL PAIN: 1
DIAPHORESIS: 0
CONSTIPATION: 0
VOMITING: 0
TINGLING: 1
VOMITING: 0
VOMITING: 0
PALPITATIONS: 0
ABDOMINAL PAIN: 1
HEADACHES: 0
DIAPHORESIS: 0
SORE THROAT: 0
SPUTUM PRODUCTION: 0
INSOMNIA: 1
DIZZINESS: 0
BACK PAIN: 1
CARDIOVASCULAR NEGATIVE: 1
HEARTBURN: 0
DIZZINESS: 0
FEVER: 0
MYALGIAS: 0
DIZZINESS: 0
WHEEZING: 0
ABDOMINAL PAIN: 1
HEMOPTYSIS: 0
ABDOMINAL PAIN: 1
CONSTIPATION: 0
SHORTNESS OF BREATH: 1
NERVOUS/ANXIOUS: 0
WEIGHT LOSS: 0
DOUBLE VISION: 0
ABDOMINAL PAIN: 0
SHORTNESS OF BREATH: 0
ORTHOPNEA: 0
ABDOMINAL PAIN: 1
FEVER: 0
SHORTNESS OF BREATH: 0
DIZZINESS: 1
FEVER: 0
ABDOMINAL PAIN: 1
CHILLS: 0
WEIGHT LOSS: 0
SINUS PAIN: 0
NERVOUS/ANXIOUS: 0
HEARTBURN: 0
WEAKNESS: 1
TINGLING: 1
SORE THROAT: 0
INSOMNIA: 0
BLURRED VISION: 0
BLURRED VISION: 0
RESPIRATORY NEGATIVE: 1
BACK PAIN: 1
NEUROLOGICAL NEGATIVE: 1
INSOMNIA: 0
VOMITING: 0
ABDOMINAL PAIN: 1
COUGH: 0
ORTHOPNEA: 0
PALPITATIONS: 0
BLOOD IN STOOL: 0
DEPRESSION: 0
DIARRHEA: 0
MYALGIAS: 0
WEAKNESS: 1
HEARTBURN: 0
CHILLS: 0
FLANK PAIN: 1
DIZZINESS: 0
CONSTIPATION: 0
SHORTNESS OF BREATH: 0
VOMITING: 0
VOMITING: 0
COUGH: 0
WEIGHT LOSS: 0
SINUS PAIN: 0
BLURRED VISION: 0
CONSTIPATION: 0
DIARRHEA: 0
WEIGHT LOSS: 1
NERVOUS/ANXIOUS: 0
SORE THROAT: 0
FEVER: 0
SINUS PAIN: 0
DIARRHEA: 0
ABDOMINAL PAIN: 1
DIZZINESS: 0
PSYCHIATRIC NEGATIVE: 1
ORTHOPNEA: 0
NAUSEA: 0
PALPITATIONS: 0
BACK PAIN: 1
COUGH: 0
SPUTUM PRODUCTION: 0

## 2022-01-01 ASSESSMENT — PAIN DESCRIPTION - PAIN TYPE
TYPE: SURGICAL PAIN
TYPE: CHRONIC PAIN
TYPE: CHRONIC PAIN
TYPE: ACUTE PAIN
TYPE: ACUTE PAIN;SURGICAL PAIN
TYPE: CHRONIC PAIN
TYPE: ACUTE PAIN
TYPE: CHRONIC PAIN
TYPE: CHRONIC PAIN
TYPE: ACUTE PAIN
TYPE: CHRONIC PAIN
TYPE: ACUTE PAIN
TYPE: CHRONIC PAIN
TYPE: ACUTE PAIN
TYPE: CHRONIC PAIN
TYPE: SURGICAL PAIN
TYPE: ACUTE PAIN
TYPE: ACUTE PAIN
TYPE: SURGICAL PAIN
TYPE: ACUTE PAIN
TYPE: ACUTE PAIN

## 2022-01-01 ASSESSMENT — FIBROSIS 4 INDEX
FIB4 SCORE: 2.88
FIB4 SCORE: 3.37
FIB4 SCORE: 11.52
FIB4 SCORE: 12.89
FIB4 SCORE: 14.31
FIB4 SCORE: 1.65
FIB4 SCORE: 5.07
FIB4 SCORE: 3.59
FIB4 SCORE: 4.68
FIB4 SCORE: 12.89
FIB4 SCORE: 2.64
FIB4 SCORE: 1.65
FIB4 SCORE: 2.64
FIB4 SCORE: 3.37
FIB4 SCORE: 2.88
FIB4 SCORE: 4.68
FIB4 SCORE: 12.04
FIB4 SCORE: 5.07
FIB4 SCORE: 2.88
FIB4 SCORE: 5.07
FIB4 SCORE: 3.59
FIB4 SCORE: 12.89
FIB4 SCORE: 1.65

## 2022-01-01 ASSESSMENT — COGNITIVE AND FUNCTIONAL STATUS - GENERAL
HELP NEEDED FOR BATHING: A LITTLE
MOBILITY SCORE: 19
CLIMB 3 TO 5 STEPS WITH RAILING: A LITTLE
TOILETING: A LITTLE
MOVING FROM LYING ON BACK TO SITTING ON SIDE OF FLAT BED: A LITTLE
SUGGESTED CMS G CODE MODIFIER MOBILITY: CK
TURNING FROM BACK TO SIDE WHILE IN FLAT BAD: A LITTLE
HELP NEEDED FOR BATHING: A LITTLE
SUGGESTED CMS G CODE MODIFIER MOBILITY: CK
MOVING TO AND FROM BED TO CHAIR: A LITTLE
CLIMB 3 TO 5 STEPS WITH RAILING: A LITTLE
MOVING TO AND FROM BED TO CHAIR: A LITTLE
SUGGESTED CMS G CODE MODIFIER DAILY ACTIVITY: CJ
DAILY ACTIVITIY SCORE: 22
DAILY ACTIVITIY SCORE: 21
MOBILITY SCORE: 18
WALKING IN HOSPITAL ROOM: A LITTLE
STANDING UP FROM CHAIR USING ARMS: A LITTLE
STANDING UP FROM CHAIR USING ARMS: A LITTLE
TOILETING: A LITTLE
WALKING IN HOSPITAL ROOM: A LITTLE
SUGGESTED CMS G CODE MODIFIER DAILY ACTIVITY: CJ
MOVING FROM LYING ON BACK TO SITTING ON SIDE OF FLAT BED: A LITTLE
DRESSING REGULAR LOWER BODY CLOTHING: A LITTLE

## 2022-01-01 ASSESSMENT — PATIENT HEALTH QUESTIONNAIRE - PHQ9
2. FEELING DOWN, DEPRESSED, IRRITABLE, OR HOPELESS: NOT AT ALL
SUM OF ALL RESPONSES TO PHQ9 QUESTIONS 1 AND 2: 0
CLINICAL INTERPRETATION OF PHQ2 SCORE: 0
1. LITTLE INTEREST OR PLEASURE IN DOING THINGS: NOT AT ALL

## 2022-01-01 ASSESSMENT — PAIN SCALES - WONG BAKER: WONGBAKER_NUMERICALRESPONSE: HURTS JUST A LITTLE BIT

## 2022-05-19 NOTE — PREPROCEDURE INSTRUCTIONS
Pt preadmitted via phone, instructions emailed. Questions answered. Pt instructed to continue regularly prescribed meds through day of procedure- NA. Per anesthesia protocol instructed to take these medications the day of procedure-Tylenol if needed. METs score >4.

## 2022-05-20 NOTE — OR SURGEON
EGD / EUS FNA - Post OP Note    PreOp Diagnosis: Abdominal pain     Pancreas body/tail mass      PostOp Diagnosis: Same      Procedure(s):  GASTROSCOPY - Wound Class: None  EGD, WITH ENDOSCOPIC US - UPPER PACREAS MASS - Wound Class: None  EGD, WITH ASPIRATION BIOPSY -FINE NEEDLE - Wound Class: None    Surgeon(s):  Gabriel Cruz M.D.    Anesthesiologist/Type of Anesthesia:  Anesthesiologist: Joel Moulton D.O./General    Surgical Staff:  Endoscopy Technician: Marry Cope    Specimens removed if any:  ID Type Source Tests Collected by Time Destination   A : Biopsy - Gastric antrum R/O H-pylori Other Other PATHOLOGY SPECIMEN Gabriel Cruz M.D. 5/20/2022 10:31 AM    B : Biopsy Slide - Pancrease body mass Other Other PATHOLOGY SPECIMEN Gabriel Cruz M.D. 5/20/2022 10:37 AM    C : Biopsy Core - Pancrease body mass Other Other PATHOLOGY SPECIMEN Gabriel Cruz M.D. 5/20/2022 10:38 AM        Dr. Cruz  [facility]  [facility]  [phone]    EGD with: Biopsy    EUS with: Fine-needle aspiration    Indication: Abnormal CT scan with body/tail of pancreas mass    Abdominal pain    Sedation: GA (Dr. Moulton)    Findings:   EGD    Esophagus     Unremarkable mucosa    Stomach     Unremarkable proximal mucosa     Distal body and antrum erythema edema and mosaic mucosal pattern - biopsied rule out H. pylori    Duodenum     Unremarkable mucosa     EUS    Celiac axis     No adenopathy    Pancreas body/tail mass     Hypoechoic, heterogeneous     Well-defined borders     Potential loss of interface with gastric wall     FNA 22-gauge [employer] acquire needle    Pancreatic duct     Unable to follow through body and tail    Ampulla     Unremarkable    CBD     Normal course and caliber     Shadowing stone present in distal duct without upstream dilation    Head of pancreas     Normal dorsal ventral transition     Normal-appearing parenchyma      **  Preliminary  pathology highly suspicious cells for malignancy  **    Plan:  Follow-up final pathology    Will schedule ERCP in the near future for bile duct stone    Follow liver enzymes      Procedure detail:    Prior to procedure, informed consent was obtained.  Risks, benefits, alternatives, including but not limited to risk of bleeding, infection, perforation, adverse reaction to sedating medicine, failure to identify pathology, pancreatitis and death were explained to the patient who accepted all risks.    Patient was prepped in the left lateral position after intubation and sedation was provided by anesthesia.    EGD  Scope tip of the Olympus flexible gastroscope was passed into the proximal esophagus.  Proximal middle and distal thirds of the esophagus were unremarkable.  Stomach was entered.  Gastric pool was suctioned.  Air was insufflated.  Scope was retroflexed to view the body fundus and cardia which was unremarkable.  Scope was straightened to view the antrum and incisura which showed mild erythema edema with a subtle mosaic mucosal pattern.  Biopsies were taken to rule out H. pylori.  The pylorus was patent.  Duodenal bulb sweep second and third portion were unremarkable.  The gastroscope was withdrawn and we proceeded with endoscopic ultrasound.    EUS  The flexible radial echoendoscope was passed into the proximal stomach.   the celiac axis was identified and there was no adenopathy.  The body and tail of the pancreas were taken up almost entirely by mass.  This was heterogeneous and hypoechoic.  It had well-defined borders.  There seem to be a small area of potential loss of interface with the gastric wall, and splenic vein.  The splenic artery was not clearly identified throughout its entire length.  The pancreatic duct was unable to be followed through the body and tail.  The scope was advanced in the duodenum the biliary ampulla was unremarkable.  The common bile duct showed a normal course and caliber  however there was a single shadowing filling defect consistent with a stone without upstream dilation.  Patient seems to be asymptomatic from this.  The head of the pancreas itself had normal appearing parenchyma and a normal dorsal ventral transition.  The radial echoendoscope was withdrawn and we proceeded with fine-needle aspiration.  The flexible linear echoendoscope was passed in the proximal stomach.  The body and tail of the pancreas were identified and pancreatic mass was once again identified.  A [employer] 22-gauge acquire needle was utilized for multiple passes in multiple throws into the lesion.  After several passes the pathologist called back to the room saying that preliminary pathology was highly suspicious for malignant cells and that if sufficient core sample was obtained that he should be able to make a diagnosis.  Based on this the procedure was deemed complete.  The needle and scope were withdrawn.  Patient tolerated the procedure well and was sent to recovery without immediate complications.      5/20/2022 10:56 AM Gabriel Cruz M.D.

## 2022-05-20 NOTE — OR NURSING
1228: Pt admitted from PACU to Phase II. Report received from Jocelin RN.     1232: Pt dressed with assistance of CNA.     1245: Pt and family educated on discharge instructions. All questions answered. Pt and family verbalized understanding.     1305: Pt discharged to home via wheelchair by RN.

## 2022-05-20 NOTE — ANESTHESIA PROCEDURE NOTES
Airway    Date/Time: 5/20/2022 10:25 AM  Performed by: Joel Moulton D.O.  Authorized by: Joel Moulton D.O.     Location:  OR  Urgency:  Elective  Indications for Airway Management:  Anesthesia      Spontaneous Ventilation: absent    Sedation Level:  Deep  Preoxygenated: Yes    Patient Position:  Sniffing  Mask Difficulty Assessment:  2 - vent by mask + OA or adjuvant +/- NMBA  Final Airway Type:  Endotracheal airway  Final Endotracheal Airway:  ETT  Cuffed: Yes    Technique Used for Successful ETT Placement:  Direct laryngoscopy    Insertion Site:  Oral  Blade Type:  Yin  Laryngoscope Blade/Videolaryngoscope Blade Size:  3  ETT Size (mm):  6.0  Measured from:  Lips  ETT to Lips (cm):  20  Placement Verified by: auscultation and capnometry    Cormack-Lehane Classification:  Grade IIa - partial view of glottis  Number of Attempts at Approach:  1

## 2022-05-20 NOTE — DISCHARGE INSTRUCTIONS
ENDOSCOPY HOME CARE INSTRUCTIONS    GASTROSCOPY OR ERCP  1. Don't eat or drink anything for about an hour after the test. You can then resume your regular diet.  2. Don't drive or drink alcohol for 24 hours. The medication you received will make you too drowsy.  3. Don't take any coffee, tea, or aspirin products until after you see your doctor. These can harm the lining of your stomach.  4. If you begin to vomit bloody material, or develop black or bloody stools, call your doctor as soon as possible.  5. If you have any neck, chest, abdominal pain or temp of 100 degrees, call your doctor.  6. See your doctor, call to schedule a follow-up appointment.    If any questions arise, call your doctor. If your doctor is not available, please feel free to call [phone]. You can also call the [facility] open 24 hours/day, 7 days/week and speak to a nurse at [phone], or toll free [phone].    ACTIVITY: Rest and take it easy for the first 24 hours.  A responsible adult is recommended to remain with you during that time.  It is normal to feel sleepy.  We encourage you to not do anything that requires balance, judgment or coordination.    MILD FLU-LIKE SYMPTOMS ARE NORMAL. YOU MAY EXPERIENCE GENERALIZED MUSCLE ACHES, THROAT IRRITATION, HEADACHE AND/OR SOME NAUSEA.    FOR 24 HOURS DO NOT:  Drive, operate machinery or run household appliances.  Drink beer or alcoholic beverages.   Make important decisions or sign legal documents.    You should CALL YOUR PHYSICIAN if you develop:  Fever greater than 101 degrees F.  Pain not relieved by medication, or persistent nausea or vomiting.  Excessive bleeding (blood soaking through dressing) or unexpected drainage from the wound.  Extreme redness or swelling around the incision site, drainage of pus or foul smelling drainage.  Inability to urinate or empty your bladder within 8 hours.  Problems with breathing or chest pain.    You should call [phone] if you develop problems  with breathing or chest pain.  If you are unable to contact your doctor or surgical center, you should go to the nearest emergency room or urgent care center.  Physician's telephone #: Dr. Cruz [phone]    If any questions arise, call your doctor.  If your doctor is not available, please feel free to call the Surgical Center at [phone].     A registered nurse may call you a few days after your surgery to see how you are doing after your procedure.    MEDICATIONS: Resume taking daily medication.  Take prescribed pain medication with food.  If no medication is prescribed, you may take non-aspirin pain medication if needed.  PAIN MEDICATION CAN BE VERY CONSTIPATING.  Take a stool softener or laxative such as senokot, pericolace, or milk of magnesia if needed.    Depression / Suicide Risk    As you are discharged from this [facility] facility, it is important to learn how to keep safe from harming yourself.    Recognize the warning signs:  Abrupt changes in personality, positive or negative- including increase in energy   Giving away possessions  Change in eating patterns- significant weight changes-  positive or negative  Change in sleeping patterns- unable to sleep or sleeping all the time   Unwillingness or inability to communicate  Depression  Unusual sadness, discouragement and loneliness  Talk of wanting to die  Neglect of personal appearance   Rebelliousness- reckless behavior  Withdrawal from people/activities they love  Confusion- inability to concentrate     If you or a loved one observes any of these behaviors or has concerns about self-harm, here's what you can do:  Talk about it- your feelings and reasons for harming yourself  Remove any means that you might use to hurt yourself (examples: pills, rope, extension cords, firearm)  Get professional help from the community (Mental Health, Substance Abuse, psychological counseling)  Do not be alone:Call your Safe Contact- someone whom you trust  who will be there for you.  Call your local CRISIS HOTLINE 026-7668 or 205-453-4983  Call your local Children's Mobile Crisis Response Team [place] (861) 029-8568 or www.[facility]  Call the toll free National Suicide Prevention Hotlines   National Suicide Prevention Lifeline 965-906-LWBM (1352)  [facility] Line Network 800-SUICIDE (017-4087)

## 2022-05-20 NOTE — ANESTHESIA POSTPROCEDURE EVALUATION
Patient: Ella Kent    Procedure Summary     Date: 05/20/22 Room / Location:  ENDOSCOPIC ULTRASOUND ROOM / SURGERY [facility]    Anesthesia Start: 1019 Anesthesia Stop: 1110    Procedures:       GASTROSCOPY (N/A Abdomen)      EGD, WITH ENDOSCOPIC US - UPPER PACREAS MASS (N/A Abdomen)      EGD, WITH ASPIRATION BIOPSY -FINE NEEDLE (N/A Abdomen) Diagnosis: (PANCREATIC MASS, NAUSEA; pending pathology)    Surgeons: Gabriel Crzu M.D. Responsible Provider: Joel Moulton D.O.    Anesthesia Type: general ASA Status: 2          Final Anesthesia Type: general  Last vitals  BP   Blood Pressure : 134/71    Temp   36.3 °C (97.3 °F)    Pulse   82   Resp   20    SpO2   98 %      Anesthesia Post Evaluation    Patient location during evaluation: PACU  Patient participation: complete - patient participated  Level of consciousness: awake and alert  Pain score: 0    Airway patency: patent  Anesthetic complications: no  Cardiovascular status: hemodynamically stable  Respiratory status: acceptable  Hydration status: euvolemic    PONV: none          No complications documented.     Nurse Pain Score: 8 (NPRS)

## 2022-05-20 NOTE — ANESTHESIA TIME REPORT
Anesthesia Start and Stop Event Times     Date Time Event    5/20/2022 0935 Ready for Procedure     1019 Anesthesia Start     1110 Anesthesia Stop        Responsible Staff  05/20/22    Name Role Begin End    Joel Moulton D.O. Anesth 1019 1110        Overtime Reason:  no overtime (within assigned shift)    Comments:

## 2022-05-20 NOTE — ANESTHESIA PREPROCEDURE EVALUATION
Case: [id number] Date/Time: 05/20/22 0945    Procedures:       GASTROSCOPY      EGD, WITH ENDOSCOPIC US - UPPER PACREAS MASS      EGD, WITH ASPIRATION BIOPSY -FINE NEEDLE    Pre-op diagnosis: PANCREATIC MASS, NAUSEA    Location:  ENDOSCOPIC ULTRASOUND ROOM / SURGERY [facility]    Surgeons: Gabriel Cruz M.D.          Relevant Problems   No relevant active problems       Physical Exam    Airway   Mallampati: II  TM distance: >3 FB  Neck ROM: full       Cardiovascular - normal exam  Rhythm: regular  Rate: normal  (-) murmur     Dental - normal exam           Pulmonary - normal exam  Breath sounds clear to auscultation     Abdominal    Neurological - normal exam                 Anesthesia Plan    ASA 2       Plan - general       Airway plan will be ETT          Induction: intravenous    Postoperative Plan: Postoperative administration of opioids is intended.    Pertinent diagnostic labs and testing reviewed    Informed Consent:    Anesthetic plan and risks discussed with patient.    Use of blood products discussed with: patient whom consented to blood products.

## 2022-05-20 NOTE — OR NURSING
1114: Report from Diana AUGUSTIN.  1130: Pt resting, states she is having some pain in her back but it is her normal, chronic pain, would like to wait until home for medication.  1145: Pt sleeping, rouses to voice, no change.  1200: Pt complaining of nausea, plan medication.  1215: Pt states nausea is improved, states back pain is worsening, does not want analgesia at this time, would rather go home.  1226: No change. Meets criteria to transfer to Stage 2.  Report RN.

## 2022-05-20 NOTE — OR NURSING
1103- Pt to PACU from Endo s/p gastroscopy with biopsies and staging of pancreatic mass. Pt ID verified and PACU monitoring establish  1114- Handoff to Jocelin RN who assumed care of pt. Dr. Stratton at  reviewing procedure findings with pt.

## 2022-06-09 PROBLEM — C25.9: Status: ACTIVE | Noted: 2022-01-01

## 2022-06-09 PROBLEM — C25.9 PANCREATIC CARCINOMA (HCC): Status: ACTIVE | Noted: 2022-01-01

## 2022-06-09 NOTE — TELEPHONE ENCOUNTER
Called patient to see where she has her scans done at. They were done at [facility] and they're unable to push the scans to [facility]. I did tell the patient to bring her scans on a CD.

## 2022-06-09 NOTE — PROGRESS NOTES
Subjective:   6/14/2022  9:09 AM  Primary care physician: Misael Heredia M.D.  Referring Provider: Gabriel Cruz M.D.    Chief Complaint:   Chief Complaint   Patient presents with   • Other     Panc Body 2x6 cm     Diagnosis:   1. Pancreatic carcinoma (HCC)  CA 19-9    Comp Metabolic Panel    CBC WITHOUT DIFFERENTIAL    CANCELED: CT-ABDOMEN WITH & W/O   2. Malignant neoplasm determined by biopsy of pancreas (HCC)  CA 19-9    Comp Metabolic Panel    CBC WITHOUT DIFFERENTIAL   3. Pain of upper abdomen     4. Abdominal ultrasound, abnormal     5. Overlapping malignant neoplasm of pancreas (HCC)       History of presenting illness:    Ella Kent is a pleasant 66 y.o. female who initially presented to PCP with left lower back pain for 4 weeks. Pain radiated/wrapped around front of abdomen and caused nausea. US abdomen noted large 6x2x2 cm lesion at body/tail portion of pancreas. Hx of cholecystectomy approx 4 years ago. Unknown family health history since she is adopted.     CT ABDOMEN on 5/13/22 reports 6.2 x 2.4 x 2.3 cm lesion in distal aspect of pancreas involving body and tail.    EUS and BX on 5/20/22 noted pancreas body mass positive for invasive moderately differentiated adenocarcinoma.     She is here with her partner today for discussion of possible surgical options. She reports moderate abdominal pain with some radiation to her back. US abdomen and CT abdomen done at [facility] in [place], [place], which I personally reviewed, which shows a large pancreatic tumor extending involving the body and tail of the pancreas.  On the scans from May 13, 2022 does not appear to be in contact with the celiac trunk but it is a month old.  Patient has not had the tumor markers done.  She has no family history of pancreatic cancer.  She is not a drinker and has never had pancreatitis.  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  I have personally reviewed the patient's imaging  studies and pathology report and endoscopic ultrasound report.  Unfortunately there is no mention with regards to vascular involvement on the EUS.    She has not had any CA 19-9 tumor markers done yet. The patient is active and overall very healthy. The patient is employed and works in medical records. She states she recovered very quickly from her laparoscopic gallbladder surgery a few years ago.    Past Medical History:   Diagnosis Date   • Pain     LBP after pancreas issue began     Past Surgical History:   Procedure Laterality Date   • NC UPPER GI ENDOSCOPY,DIAGNOSIS N/A 5/20/2022    Procedure: GASTROSCOPY;  Surgeon: Gabriel Cruz M.D.;  Location: [facility];  Service: EUS   • EGD W/ENDOSCOPIC ULTRASOUND N/A 5/20/2022    Procedure: EGD, WITH ENDOSCOPIC US - UPPER PACREAS MASS;  Surgeon: Gabriel Cruz M.D.;  Location: [facility];  Service: EUS   • EGD WITH ASP/BX N/A 5/20/2022    Procedure: EGD, WITH ASPIRATION BIOPSY -FINE NEEDLE;  Surgeon: Gabriel Cruz M.D.;  Location: [facility];  Service: EUS   • NEHEMIAS BY LAPAROSCOPY  2018     Allergies   Allergen Reactions   • Codeine Nausea     Nauseous with most narcotics   • Hydrocodone Nausea     Outpatient Encounter Medications as of 6/14/2022   Medication Sig Dispense Refill   • acetaminophen (TYLENOL) 500 MG Tab Take 500-1,000 mg by mouth every 6 hours as needed.     • VITAMIN D, ERGOCALCIFEROL, PO Take 1 Tablet by mouth every day.     • CALCIUM PO Take 1 Tablet by mouth every day.     • Multiple Vitamins-Minerals (MULTIVITAMIN WOMEN PO) Take 1 Tablet by mouth every day.     • Multiple Vitamins-Minerals (EMERGEN-C IMMUNE PLUS PO) Take 1 Dose by mouth every day.       No facility-administered encounter medications on file as of 6/14/2022.     Social History     Socioeconomic History   • Marital status:      Spouse name: Not on file   • Number of children: Not on file   • Years of education: Not on file  "  • Highest education level: Not on file   Occupational History   • Not on file   Tobacco Use   • Smoking status: Former Smoker   • Smokeless tobacco: Never Used   • Tobacco comment: As a teen tried cigs   Vaping Use   • Vaping Use: Never used   Substance and Sexual Activity   • Alcohol use: Not on file     Comment: rare beer   • Drug use: Never   • Sexual activity: Not on file   Other Topics Concern   • Not on file   Social History Narrative   • Not on file     Social Determinants of Health     Financial Resource Strain: Not on file   Food Insecurity: Not on file   Transportation Needs: Not on file   Physical Activity: Not on file   Stress: Not on file   Social Connections: Not on file   Intimate Partner Violence: Not on file   Housing Stability: Not on file      Social History     Tobacco Use   Smoking Status Former Smoker   Smokeless Tobacco Never Used   Tobacco Comment    As a teen tried cigs     Social History     Substance and Sexual Activity   Alcohol Use None    Comment: rare beer     Social History     Substance and Sexual Activity   Drug Use Never      History reviewed. No pertinent family history.    Review of Systems   Gastrointestinal: Positive for abdominal pain.   Musculoskeletal: Positive for back pain.   All other systems reviewed and are negative.       Objective:   /80 (BP Location: Left arm, Patient Position: Sitting, BP Cuff Size: Adult)   Pulse 93   Temp 36.3 °C (97.3 °F)   Ht 1.473 m (4' 10\")   Wt 56.2 kg (124 lb)   SpO2 97%   BMI 25.92 kg/m²     Physical Exam  Vitals reviewed.   Constitutional:       General: She is not in acute distress.     Appearance: Normal appearance. She is normal weight. She is not ill-appearing.   HENT:      Head: Normocephalic and atraumatic.      Nose: Nose normal.      Mouth/Throat:      Pharynx: Oropharynx is clear.   Eyes:      Conjunctiva/sclera: Conjunctivae normal.   Cardiovascular:      Rate and Rhythm: Normal rate.   Pulmonary:      Effort: " Pulmonary effort is normal. No respiratory distress.   Abdominal:      General: There is no distension.      Palpations: Abdomen is soft.      Tenderness: There is no abdominal tenderness. There is no guarding.   Skin:     General: Skin is warm and dry.      Coloration: Skin is not jaundiced.   Neurological:      General: No focal deficit present.      Mental Status: She is alert and oriented to person, place, and time.   Psychiatric:         Mood and Affect: Mood normal.         Behavior: Behavior normal.         Thought Content: Thought content normal.         Judgment: Judgment normal.       Labs       Imaging  CT ABDOMEN (5/13/22)      US ABDOMEN ( 5/11/22)      Pathology  BX (5/20/22)  FINAL DIAGNOSIS:   A. Gastric antrum biopsy:          Fragments of benign gastric mucosa demonstrate scattered mild           chronic inflammation and reactive changes.          Warthin-Starry stain negative for Helicobacter-like organisms.          No intestinal metaplasia or malignancy identified.   B. Biopsy slide - pancreas body mass:          Positive for malignant cells, most consistent with adenocarcinoma.   C. Biopsy core - pancreas body mass:          Biopsy cores demonstrate invasive moderately differentiated adenocarcinoma.     EUS/BX (5/20/22)  Findings:              EGD                          Esophagus                                      Unremarkable mucosa                          Stomach                                      Unremarkable proximal mucosa                                      Distal body and antrum erythema edema and mosaic mucosal pattern - biopsied rule out H. pylori                          Duodenum                                      Unremarkable mucosa              EUS                          Celiac axis                                      No adenopathy                          Pancreas body/tail mass                                      Hypoechoic, heterogeneous                                       Well-defined borders                                      Potential loss of interface with gastric wall                                      FNA 22-gauge [place] [place] acquire needle                          Pancreatic duct                                      Unable to follow through body and tail                          Ampulla                                      Unremarkable                          CBD                                      Normal course and caliber                                      Shadowing stone present in distal duct without upstream dilation                          Head of pancreas                                      Normal dorsal ventral transition                                      Normal-appearing parenchyma                             **  Preliminary pathology highly suspicious cells for malignancy  **    Procedures  PROCEDURE (5/20/22)  GASTROSCOPY - Wound Class: None  EGD, WITH ENDOSCOPIC US - UPPER PACREAS MASS - Wound Class: None  EGD, WITH ASPIRATION BIOPSY -FINE NEEDLE - Wound Class: None      Diagnosis:     1. Pancreatic carcinoma (HCC)  CA 19-9    Comp Metabolic Panel    CBC WITHOUT DIFFERENTIAL    CANCELED: CT-ABDOMEN WITH & W/O   2. Malignant neoplasm determined by biopsy of pancreas (HCC)  CA 19-9    Comp Metabolic Panel    CBC WITHOUT DIFFERENTIAL   3. Pain of upper abdomen     4. Abdominal ultrasound, abnormal     5. Overlapping malignant neoplasm of pancreas (HCC)           Medical Decision Making:  Today's Assessment / Status / Plan:     In light of the present findings, the patient has a sizeable carcinoma of the body/tail of the pancreas. However, the most recent imaging is a CT abdomen from 5/13/22, and we need additional workup for proper treatment and surgical planning. We had an extensive discussion with the patient and her  regarding the pancreas pathology, imaging findings, and different options re surgery and chemo therapy and more. She  is interested in surgery, and open to chemotherapy, pending finds from further workup.    The patient lives in [facility], and it is not clear what resources and equipment the local hospital has for the necessary imaging and other workup for the patient's cancer. The patient stated she prefers to have her care at [facility], for help in care coordination and to speed up the process. We will proceed with PET CT and CT pancreas protocol to ensure we have an updated detailed view of the pancreatic tumor and any involvement of adjacent structures, and any metastasis beyond the pancreas. We will also do standard labs, including CA 19-9 markers. We will also include the patient to the upcoming tumor board discussion.  The patient will be referred to Dr. Leggett for medical oncology because the patient first have all her care done at [facility].  We will arrange for the imaging studies on the same day that her visit with Dr. Leggett's.    I, Dr. Rivers have entered, reviewed and confirmed the above diagnoses related to this patient on this date of service, 6/14/2022  9:09 AM.    She agreed and verbalized her agreement and understanding with the current plan. I answered all questions and concerns she has at this time and advised her to call at any time in the interim with questions or concerns in regards to her care.    Thank you for allowing me to participate in her care, I will continue to follow closely.       Please note that this dictation was created using voice recognition software. I have made every reasonable attempt to correct obvious errors, but I expect that there are errors of grammar and possibly content that I did not discover before finalizing the note.     Thank you for this consultation and allowing me to participate in your patient's care. If I can be of further service please contact my office.

## 2022-06-14 PROBLEM — C25.8 OVERLAPPING MALIGNANT NEOPLASM OF PANCREAS (HCC): Status: ACTIVE | Noted: 2022-01-01

## 2022-06-14 PROBLEM — R93.5 ABDOMINAL ULTRASOUND, ABNORMAL: Status: ACTIVE | Noted: 2022-01-01

## 2022-06-14 PROBLEM — R10.10 PAIN OF UPPER ABDOMEN: Status: ACTIVE | Noted: 2022-01-01

## 2022-06-15 NOTE — TELEPHONE ENCOUNTER
1st attempt to contact the patient- Left voicemail for patient requesting a return call to schedule New Oncology appointment. Oleksandr/ William/ pancreatic carcinoma/ Tita

## 2022-06-30 PROBLEM — Z85.89 HISTORY OF KNOWN METASTASIS TO LIVER: Status: ACTIVE | Noted: 2022-01-01

## 2022-06-30 NOTE — ANESTHESIA POSTPROCEDURE EVALUATION
Patient: Ella Kent    Procedure Summary     Date: 06/30/22 Room / Location: [place] [address] / SURGERY [facility]    Anesthesia Start: 0754 Anesthesia Stop: 0859    Procedure: INSERTION, POWER PORT (Right Chest) Diagnosis: (METASTATIC PANCREATIC CARCINOMA)    Surgeons: Navjot Rivers M.D. Responsible Provider: Son Summers III, M.D.    Anesthesia Type: general ASA Status: 2          Final Anesthesia Type: general  Last vitals  BP   Blood Pressure : (!) 144/87    Temp   36.4 °C (97.6 °F)    Pulse   68   Resp   16    SpO2   96 %      Anesthesia Post Evaluation    Patient location during evaluation: PACU  Patient participation: complete - patient participated  Level of consciousness: awake and alert  Pain score: 0    Airway patency: patent  Anesthetic complications: no  Cardiovascular status: hemodynamically stable  Respiratory status: acceptable  Hydration status: euvolemic    PONV: none          No complications documented.     Nurse Pain Score: 0 (NPRS)

## 2022-06-30 NOTE — PROGRESS NOTES
Consult:  Hematology/Oncology      Referring Physician: Navjot Grey MD  Primary Care:  Misael Heredia M.D.    Diagnosis: Metastatic adenocarcinoma of the pancreas to liver    Chief Complaint: Metastatic adenocarcinoma of the pancreas to liver    History of Presenting Illness:  Ella Kent is a delightful 66 y.o. female who was in her usual state of excellent health until spring 2022 when she began developing pain in her left lower back radiating around to the front of the abdomen.  She developed nausea associated with this and lost 12 pounds in the last 3 months.  She had a ultrasound of the abdomen and a CT scan on 5/13/2022 for this month  A lot of people in his yeah but is getting IV on this office to have the most recent protocol or just is doing okay of the abdomen in [facility] which showed a 6.2 x 2.4 x 2.3 cm mass in the distal aspects of the pancreas involving the body and tail.  The liver was said to have steatosis but no definite evidence of disease.  She underwent an ERCP with biopsies which showed invasive moderately differentiated adenocarcinoma from the core biopsy of the pancreas body.  She was seen by Dr. Grey who felt that the lesion was resectable and he wanted to evaluate her further before surgery.  A Port-A-Cath has been placed in the meantime.  Unfortunately CT scan of the abdomen and pelvis done yesterday showed multiple hypodense masses in the liver measuring up to 3.8 cm in for a 3.4 cm in segment 6/7 and 2 cm in segment 8.  The pancreatic body mass now measured 6.7 x 3.1 x 3.1 cm.  She also had a PET CT scan yesterday which showed numerous foci of increased activity throughout the liver as well as the pancreatic body and tail.  No hypermetabolic lymph nodes were noticed.  Because of these findings surgery was aborted and she is sent to medical oncology to discuss systemic therapy.    Overall she is in excellent health working full-time up until now d and  has no significant ongoing medical problems.  She had her gallbladder out in the past without difficulty.  There is no significant family history.  Her pain from the cancer is relieved with Tylenol or Advil and she has not required narcotics in the past.        Past Medical History:   Diagnosis Date   • Pain     LBP after pancreas issue began       Past Surgical History:   Procedure Laterality Date   • OH UPPER GI ENDOSCOPY,DIAGNOSIS N/A 5/20/2022    Procedure: GASTROSCOPY;  Surgeon: Gabriel Cruz M.D.;  Location: [facility];  Service: EUS   • EGD W/ENDOSCOPIC ULTRASOUND N/A 5/20/2022    Procedure: EGD, WITH ENDOSCOPIC US - UPPER PACREAS MASS;  Surgeon: Gabriel Cruz M.D.;  Location: [facility];  Service: EUS   • EGD WITH ASP/BX N/A 5/20/2022    Procedure: EGD, WITH ASPIRATION BIOPSY -FINE NEEDLE;  Surgeon: Gabriel Cruz M.D.;  Location: [facility];  Service: EUS   • NEHEMIAS BY LAPAROSCOPY  2018       Social History     Tobacco Use   • Smoking status: Former Smoker   • Smokeless tobacco: Never Used   • Tobacco comment: As a teen tried cigs   Vaping Use   • Vaping Use: Never used   Substance Use Topics   • Drug use: Never        History reviewed. No pertinent family history.    Allergies as of 06/30/2022 - Reviewed 06/30/2022   Allergen Reaction Noted   • Codeine Nausea 05/19/2022   • Hydrocodone Nausea 05/19/2022         Current Outpatient Medications:   •  traMADol (ULTRAM) 50 MG Tab, Take 1 Tablet by mouth every four hours as needed for Severe Pain for up to 10 days., Disp: 50 Tablet, Rfl: 0  •  celecoxib (CELEBREX) 200 MG Cap, Take 1 Capsule by mouth 2 times a day., Disp: 60 Capsule, Rfl: 1  •  promethazine (PHENERGAN) 25 MG Tab, Take 0.5 Tablets by mouth every 6 hours as needed for Nausea/Vomiting., Disp: 30 Tablet, Rfl: 0  •  ibuprofen (MOTRIN) 200 MG Tab, Take 200 mg by mouth every 6 hours as needed., Disp: , Rfl:   •  acetaminophen (TYLENOL) 500 MG Tab,  Take 500-1,000 mg by mouth every 6 hours as needed., Disp: , Rfl:   •  VITAMIN D, ERGOCALCIFEROL, PO, Take 1 Tablet by mouth every day., Disp: , Rfl:   •  CALCIUM PO, Take 1 Tablet by mouth every day., Disp: , Rfl:   •  Multiple Vitamins-Minerals (MULTIVITAMIN WOMEN PO), Take 1 Tablet by mouth every day., Disp: , Rfl:   •  Multiple Vitamins-Minerals (EMERGEN-C IMMUNE PLUS PO), Take 1 Dose by mouth every day., Disp: , Rfl:   No current facility-administered medications for this visit.    Facility-Administered Medications Ordered in Other Visits:   •  ceFAZolin (ANCEF) injection, , Intravenous, PRN, Son Summers III, M.D., 2 g at 06/30/22 0756  •  propofol (DIPRIVAN) injection, , Intravenous, PRN, Son Summers III, M.D., 40 mg at 06/30/22 0846  •  rocuronium (ZEMURON) injection, , Intravenous, PRN, Son Summers III, M.D., 50 mg at 06/30/22 0803  •  lidocaine PF (XYLOCAINE-MPF) 2 % injection PF, , Intravenous, PRN, Son Summers III, M.D., 40 mg at 06/30/22 0803  •  dexamethasone (DECADRON) injection, , Intravenous, PRN, Son Summers III, M.D., 8 mg at 06/30/22 0806  •  fentaNYL (SUBLIMAZE) injection, , Intravenous, PRNSon III, M.D., 50 mcg at 06/30/22 0755  •  midazolam (VERSED) injection, , Intravenous, PRNSon III, M.D., 1 mg at 06/30/22 0755  •  labetalol (NORMODYNE/TRANDATE) injection, , Intravenous, PRNSon III, M.D., 10 mg at 06/30/22 0821  •  sugammadex (BRIDION) injection, , Intravenous, PRNSon III, M.D., 200 mg at 06/30/22 0835  •  ondansetron (ZOFRAN) syringe/vial injection, , Intravenous, PRN, Son Summers III, M.D., 4 mg at 06/30/22 0835  •  ketorolac (TORADOL) injection, , Intravenous, PRN, Son Summers III, M.D., 30 mg at 06/30/22 0835    Review of Systems:  Review of Systems   Constitutional: Positive for weight loss.   HENT: Negative.    Eyes: Negative.    Respiratory: Negative.    Cardiovascular: Negative.    Gastrointestinal: Positive for  "abdominal pain and nausea.   Genitourinary: Positive for flank pain.   Musculoskeletal: Positive for back pain.   Skin: Negative.    Neurological: Negative.    Endo/Heme/Allergies: Negative.    Psychiatric/Behavioral: Negative.           Physical Exam:  Vitals:    06/30/22 1008   BP: (!) 154/103   Pulse: 71   Resp: 18   Temp: 36.2 °C (97.2 °F)   TempSrc: Temporal   SpO2: 93%   Weight: 56.3 kg (124 lb 0.1 oz)   Height: 1.486 m (4' 10.5\")       DESC; KARNOFSKY SCALE WITH ECOG EQUIVALENT: 90, Able to carry on normal activity; minor signs or symptoms of disease (ECOG equivalent 0)    DISTRESS LEVEL: mild distress    Physical Exam     Detailed physical exam was not performed today  Labs:  Pre-Admission Testing on 06/29/2022   Component Date Value Ref Range Status   • WBC 06/29/2022 8.2  4.8 - 10.8 K/uL Final   • RBC 06/29/2022 4.50  4.20 - 5.40 M/uL Final   • Hemoglobin 06/29/2022 14.5  12.0 - 16.0 g/dL Final   • Hematocrit 06/29/2022 42.5  37.0 - 47.0 % Final   • MCV 06/29/2022 94.4  81.4 - 97.8 fL Final   • MCH 06/29/2022 32.2  27.0 - 33.0 pg Final   • MCHC 06/29/2022 34.1  33.6 - 35.0 g/dL Final   • RDW 06/29/2022 47.5  35.9 - 50.0 fL Final   • Platelet Count 06/29/2022 287  164 - 446 K/uL Final   • MPV 06/29/2022 10.5  9.0 - 12.9 fL Final   • Neutrophils-Polys 06/29/2022 61.10  44.00 - 72.00 % Final   • Lymphocytes 06/29/2022 24.80  22.00 - 41.00 % Final   • Monocytes 06/29/2022 10.10  0.00 - 13.40 % Final   • Eosinophils 06/29/2022 3.00  0.00 - 6.90 % Final   • Basophils 06/29/2022 0.60  0.00 - 1.80 % Final   • Immature Granulocytes 06/29/2022 0.40  0.00 - 0.90 % Final   • Nucleated RBC 06/29/2022 0.00  /100 WBC Final   • Neutrophils (Absolute) 06/29/2022 5.01  2.00 - 7.15 K/uL Final    Includes immature neutrophils, if present.   • Lymphs (Absolute) 06/29/2022 2.03  1.00 - 4.80 K/uL Final   • Monos (Absolute) 06/29/2022 0.83  0.00 - 0.85 K/uL Final   • Eos (Absolute) 06/29/2022 0.25  0.00 - 0.51 K/uL Final   • Baso " (Absolute) 2022 0.05  0.00 - 0.12 K/uL Final   • Immature Granulocytes (abs) 2022 0.03  0.00 - 0.11 K/uL Final   • NRBC (Absolute) 2022 0.00  K/uL Final   • Sodium 2022 137  135 - 145 mmol/L Final   • Potassium 2022 3.4 (A) 3.6 - 5.5 mmol/L Final   • Chloride 2022 98  96 - 112 mmol/L Final   • Co2 2022 26  20 - 33 mmol/L Final   • Anion Gap 2022 13.0  7.0 - 16.0 Final   • Glucose 2022 143 (A) 65 - 99 mg/dL Final   • Bun 2022 7 (A) 8 - 22 mg/dL Final   • Creatinine 2022 0.49 (A) 0.50 - 1.40 mg/dL Final   • Calcium 2022 9.3  8.5 - 10.5 mg/dL Final   • AST(SGOT) 2022 43  12 - 45 U/L Final   • ALT(SGPT) 2022 36  2 - 50 U/L Final   • Alkaline Phosphatase 2022 137 (A) 30 - 99 U/L Final   • Total Bilirubin 2022 0.8  0.1 - 1.5 mg/dL Final   • Albumin 2022 4.4  3.2 - 4.9 g/dL Final   • Total Protein 2022 7.7  6.0 - 8.2 g/dL Final   • Globulin 2022 3.3  1.9 - 3.5 g/dL Final   • A-G Ratio 2022 1.3  g/dL Final   • PT 2022 13.4  12.0 - 14.6 sec Final   • INR 2022 1.05  0.87 - 1.13 Final    Comment: INR - Non-therapeutic Reference Range: 0.87-1.13  INR - Therapeutic Reference Range: 2.0-4.0     • ABO Grouping Only 2022 O   Final   • Rh Grouping Only 2022 POS   Final   • Antibody Screen Scrn 2022 NEG   Final   • Report 2022    Final                    Value:[facility]    Test Date:  2022  Pt Name:    SEFERINO FULTON              Department: [facility]  MRN:        [id number]                      Room:  Gender:     Female                       Technician: JEFF  :        [id number]                   Requested By:CRISTELA MELISSA  Order #:    [id number]                    Reading MD: Harsh Alas MD    Measurements  Intervals                                Axis  Rate:       79                           P:          -27  NY:         132                          QRS:         2  QRSD:       86                           T:          15  QT:         380  QTc:        436    Interpretive Statements  SINUS RHYTHM  BORDERLINE T ABNORMALITIES, ANTERIOR LEADS  No previous ECG available for comparison  Electronically Signed On 6- 14:50:06 PDT by Harsh Alas MD     • ABO Rh Confirm 06/30/2022 O POS   Final   • GFR (CKD-EPI) 06/29/2022 103  >60 mL/min/1.73 m 2 Final    Comment: Effective 3/15/2022, estimated Glomerular Filtration Rate  is calculated using race neutral CKD-EPI 2021 equation  per NKF-ASN recommendations.         Imaging:   All listed images below have been independently reviewed by me. I agree with the findings as summarized below:    CT-ABDOMEN WITH & W/O    Result Date: 6/29/2022 6/29/2022 4:03 PM HISTORY/REASON FOR EXAM:  Moderately differentiated adenocarcinoma of the pancreatic body TECHNIQUE/EXAM DESCRIPTION: CT scan of the abdomen without and with contrast using pancreatic protocol. Intravenous contrast:  100 mL of Omnipaque 350 nonionic contrast without complication. Low dose optimization technique was utilized for this CT exam including automated exposure control and adjustment of the mA and/or kV according to patient size. COMPARISON: No prior studies available. FINDINGS: Pancreas: The pancreatic body tumor measures approximately 6.7 x 3.1 x 3.1 cm, previously 4.9 x 2 x 2.3 cm. The tumor abuts the anterior left pararenal fascia with an adjacent nodule measuring approximately 0.9 cm. Tumor abuts the posterior wall of the stomach with questionable serosal involvement. No extension into the lumen of the stomach is appreciated. Arteries: Celiac artery: No involvement SMA: Abutment If SMA involvement, extension to the first branch: No Common hepatic artery: No involvement Veins: SMV: No involvement IMV: Abutment an abnormal contour of the IMV. Portal vein : Abutment of the portal confluence without distortion or narrowing of the vein Thrombus present: None Splenic  vein: Occluded Peritoneal or omental nodules: None Ascites: None. Lymph nodes: No adenopathy. Gallbladder and biliary system: Surgically removed gallbladder. No biliary dilatation Liver: There is hepatic steatosis. Multiple hypodense masses are scattered throughout the liver. Index lesions are as follows: Mass in 4a measures 3.8 cm on image 21 Mass bordering segments 6 and 7 measures 3.4 cm on image 35 Mass in segment 8 measures 2 cm on image 32. Spleen: Unremarkable. Kidneys: Symmetric enhancement. No masses or hydronephrosis. Adrenal glands: Unremarkable Bowel: Grossly unremarkable Bones: No suspicious bony lesions. Lung bases: Unremarkable.     1.  Interval enlargement in the pancreatic body and tail tumor. Narrowing of the inferior mesenteric vein and occlusion of the splenic vein. Abutment of the anterior superior mesenteric artery and portal confluence. Tumor extends to the posterior wall of  the stomach without definite invasion of the gastric lumen 2.  Multiple hepatic masses are consistent with metastatic disease. 3.  Hepatic steatosis.    DX-PORTABLE FLUOROSCOPY < 1 HOUR    Result Date: 6/30/2022 6/30/2022 7:55 AM HISTORY/REASON FOR EXAM:  Main OR; port placement. TECHNIQUE/EXAM DESCRIPTION AND NUMBER OF VIEWS: Single fluoroscopic image of the right upper chest was obtained in the operating room. COMPARISON: PET/CT, 6/29/2022. FINDINGS: The single digital fluoroscopic image demonstrates appropriate position of the tip of the right subclavian Port-A-Cath, terminating over the superior vena cava. Fluoroscopy time: 1 second     Fluoroscopic guidance provided during right subclavian Port-A-Cath placement.    [id number] BASE TO MID-THIGH    Result Date: 6/29/2022 6/29/2022 1:46 PM HISTORY/REASON FOR EXAM:  Pancreatic adenocarcinoma, staging. TECHNIQUE/EXAM DESCRIPTION AND NUMBER OF VIEWS: PET body imaging. Initially, 15.15 mCi F-18 FDG was administered intravenously under standardized conditions.  Approximately 45 minutes after FDG administration, the patient was placed in the supine position on the PET CT table. Blood glucose level was 135 mg/dL. Low dose spiral CT imaging was performed from the skull base to the mid thighs. PET imaging was then performed from the skull base to the mid thighs. CT images, PET images, and PET/CT fused images were reviewed on a PACS 3D workstation. The limited non-contrast CT data are used primarily for attenuation correction and anatomic correlation.  Evaluation of solid organs and bowel are especially limited utilizing this technique. COMPARISON: Outside CT abdomen and pelvis 5/13/2022 FINDINGS: Head and neck: No abnormal focal FDG activity. Chest: No abnormal focal FDG activity. Abdomen and pelvis: Numerous foci of increased activity throughout the liver, max SUV 12.42.  Heterogeneous increased activity in the pancreatic body and tail, max SUV 9.89.  Mildly prominent urinary activity within the RIGHT renal collecting system, without definite hydronephrosis on CT imaging.  No abnormal activity in the pelvis. Musculoskeletal: No focal abnormal bony uptake. Incidental findings on CT: Heterogeneous low-attenuation throughout the liver.  Prior cholecystectomy.  Prominent distal pancreatic body and tail.     1.  Heterogeneous increased activity in the pancreatic body and tail consistent with known neoplasm. 2.  Numerous hypermetabolic foci throughout the liver consistent with diffuse hepatic metastatic disease.       Pathology:   SURGICAL PATHOLOGY CONSULTATION     FINAL DIAGNOSIS:     A. Gastric antrum biopsy:          Fragments of benign gastric mucosa demonstrate scattered mild           chronic inflammation and reactive changes.          Warthin-Starry stain negative for Helicobacter-like organisms.          No intestinal metaplasia or malignancy identified.   B. Biopsy slide - pancreas body mass:          Positive for malignant cells, most consistent with            adenocarcinoma.   C. Biopsy core - pancreas body mass:          Biopsy cores demonstrate invasive moderately differentiated           adenocarcinoma.     Comment: Pertinent slides from specimen B and in specimen C were also   reviewed by another pathologist, Dr. Martinez, who agreed with the above   interpretation. There is a sufficient amount of tissue for some   ancillary studies in paraffin block C1.                                         Diagnosis performed by:                                       TIFFANIE COOK MD     Assessment & Plan:       1.  Stage IV adenocarcinoma of the body/tail of the pancreas with multiple liver metastases.    Plan: We are going to obtain a foundation 1 liquid biopsy today and also get a CA 19-9 and CEA.  I am setting her up to start FOLFIRINOX in 1 week.  She will follow-up with Dr. Leggett subsequently.    ny questions and concerns raised by the patient were answered to the best of my ability. Thank you for allowing me to participate in the care for this patient. Please feel free to contact me for any questions or concerns.     Angus Del Angel M.D.

## 2022-06-30 NOTE — DISCHARGE INSTRUCTIONS
Okay to shower in the morning.  Leave dressings on for 6 days.   Okay to use port, follow-up with medical oncologist for chemotherapy   Follow-up with Dr. Rivers between 4 and 6 weeks if needed.   Call Dr. Rivers if you develop a fever greater than 101.5 or there is severe pain in the area of the PowerPort.   PLEASE GO TO [address] IN THE  BUILDING FOR 10AM APPOINTMENT WITH DR. LOREE VIEIRA FROM MEDICAL ONCOLOGY    If any questions arise, call your provider.  If your provider is not available, please feel free to call the Surgical Center at [phone].    MEDICATIONS: Resume taking daily medication.  Take prescribed pain medication with food.  If no medication is prescribed, you may take non-aspirin pain medication if needed.  PAIN MEDICATION CAN BE VERY CONSTIPATING.  Take a stool softener or laxative such as senokot, pericolace, or milk of magnesia if needed.    Last pain medication given at n/a.    What to Expect Post Anesthesia    Rest and take it easy for the first 24 hours.  A responsible adult is recommended to remain with you during that time.  It is normal to feel sleepy.  We encourage you to not do anything that requires balance, judgment or coordination.    FOR 24 HOURS DO NOT:  Drive, operate machinery or run household appliances.  Drink beer or alcoholic beverages.  Make important decisions or sign legal documents.    To avoid nausea, slowly advance diet as tolerated, avoiding spicy or greasy foods for the first day.  Add more substantial food to your diet according to your provider's instructions.  Babies can be fed formula or breast milk as soon as they are hungry.  INCREASE FLUIDS AND FIBER TO AVOID CONSTIPATION.    MILD FLU-LIKE SYMPTOMS ARE NORMAL.  YOU MAY EXPERIENCE GENERALIZED MUSCLE ACHES, THROAT IRRITATION, HEADACHE AND/OR SOME NAUSEA.

## 2022-06-30 NOTE — ANESTHESIA TIME REPORT
Anesthesia Start and Stop Event Times     Date Time Event    6/30/2022 0700 Ready for Procedure     0754 Anesthesia Start     0859 Anesthesia Stop        Responsible Staff  06/30/22    Name Role Begin End    Son Summers III, M.D. Anesth 0754 0859        Overtime Reason:  no overtime (within assigned shift)    Comments:

## 2022-06-30 NOTE — OP REPORT
DATE OF SERVICE:  06/30/2022     INDICATIONS:  The patient is a 66-year-old female patient who had been   referred to me from Dr. Cruz with adenocarcinoma of the body of the   pancreas.  The patient was scheduled for a distal pancreatectomy, splenectomy   based on her original films, but we actually had reordered films and they   underwent a PET scan and a pancreas protocol CT scan yesterday afternoon and   unfortunately, she was found to have significant liver metastasis.  No other   tumors anywhere else.  So, the patient is in need of neoadjuvant or upfront   chemotherapy to see how she responds.  She has been referred to Dr. Angus Del Angel who will manage her transition to Dr. Oleksandr elizabeth, but the   patient will be seen today from medical oncology, so we converted the   procedure to a PowerPort.     SURGEON:  Navjot Rivers MD     ASSISTANT:  Geoff MURRY     ANESTHESIOLOGIST:  Son Summers MD     ANESTHESIA:  General and local anesthetic.     ESTIMATED BLOOD LOSS:  5 mL     COMPLICATIONS:  No complications.     FINDINGS:  Fluoroscopically placed catheter to the superior vena cava atrial   junction with great inflow and outflow x3.     PREOPERATIVE DIAGNOSIS:  Metastatic pancreatic cancer of body of pancreas.     POSTOPERATIVE DIAGNOSIS:  Metastatic pancreatic cancer of body of pancreas.     PROCEDURE:  Placement of right cephalic cutdown PowerPort.     CASE CLASS:  Clean.     DESCRIPTION OF PROCEDURE:  The patient was brought to OR, placed under general   anesthetic with both arms tucked, prepped with chlorhexidine prep on the   right neck, shoulder and chest wall, covered with sterile drapes, given   preoperative antibiotics.  Timeout was done, which was adequate.  At that   point, she was given 5 mL of 0.5% Marcaine with epinephrine in the right   deltopectoral groove.  Incision made with #10 blade with Bovie electrocautery.    Upon entering the deltopectoral groove, we actually  identified a fairly   large cephalic vein, we got proximal to this control with 3-0 Vicryls, made a   small venotomy, elevated it, tied the distal, and then placed an 8-English   flushed PowerPort catheter to the superior vena cava atrial junction with   great inflow and outflow.  At that point, the proximal was tied.  It was   clamped with a rubber shot, cut and attached to a flushed PowerPort,   unclamped, tested for a second time having great inflow and outflow.  At that   point, she was placed in reverse Trendelenburg and we created a pocket along   the pectoral fascia.  We then secured this to the pectoral fascia with   interrupted 2-0 Prolene x4.  We tested for a third time having great inflow   and outflow in its final position.  At that point, the operation was   completed.  We gave a total of 30 mL of 0.5% Marcaine with epinephrine   throughout the area of the soft tissue, skin and pectoral fascia, soft tissue   was brought together with interrupted 3-0 Vicryl.  The skin was closed with   4-0 Monocryl, 2 x 2, Tegaderm, extubated and transferred to recovery.        ______________________________  Navjot Rivers MD    [facility]    DD:  06/30/2022 08:58  DT:  06/30/2022 10:20    Job#:  [id number]    CC:Angus Del Angel MD

## 2022-06-30 NOTE — ANESTHESIA PREPROCEDURE EVALUATION
Case: [id number] Date/Time: 06/30/22 0645    Procedures:       DISTAL PANCREATECTOMY + SPLENECTOMY, NODE DISSECTION, OMENTAL FLAP, OTHER INDICATED PROCEDURES AND INTRA-OPERATIVE ULTRASOUND      SPLENECTOMY      LYMPHADENECTOMY      CREATION, FLAP, OMENTUM      ULTRASOUND GUIDANCE    Pre-op diagnosis: PANCREATIC CARCINOMA    Location: [facility] OR 10 / SURGERY [facility]    Surgeons: Navjot Rivers M.D.          Relevant Problems   No relevant active problems       Physical Exam    Airway   Mallampati: II  TM distance: >3 FB  Neck ROM: full       Cardiovascular - normal exam  Rhythm: regular  Rate: normal  (-) murmur     Dental - normal exam           Pulmonary - normal exam  Breath sounds clear to auscultation     Abdominal    Neurological - normal exam                 Anesthesia Plan    ASA 2       Plan - general       Airway plan will be LMA          Induction: intravenous    Postoperative Plan: Postoperative administration of opioids is intended.    Pertinent diagnostic labs and testing reviewed    Informed Consent:    Anesthetic plan and risks discussed with patient.    Use of blood products discussed with: patient whom consented to blood products.

## 2022-06-30 NOTE — OR NURSING
Patient recovered well in post-op. AAOx4. VSS, on RA. Surgical sites HUYEN, surgical dressing in place CDI. Declines pain and nausea throughout recovery. Spouse updated and discussed POC. No belongings in pacu. Report called to DEMETRIA Davis. Patient transported to phase II w/ RN.

## 2022-07-05 NOTE — PROGRESS NOTES
Oncology nurse navigator called patient introduced self my role and services.  Patient denies any needs of barriers at this time.  ONN sent introduction letter with contact information.

## 2022-07-14 NOTE — PROGRESS NOTES
"Pharmacy Chemotherapy Verification    Dx: Metastatic adenocarcinoma of the pancreas to liver  Cycle: 1 (Previous treatment = N/A)  Regimen and Dosing Reference  OXALIplatin 85 mg/m2 IV over 2 hours on Day 1  Leucovorin 400 mg/m2 IV over 2 hours on Day 1  Irinotecan 150 mg/m2 IV over 90 minutes on Day 1  Fluorouracil 1200 mg/m2 IV continuous infusion daily on Days 1-2 (2400 mg/m2 IV over 46 hours)   14 day cycle for 4-12 cycles or until disease progression or unacceptable toxicity  NCCN Guidelines for Pancreatic Adenocarcinoma.V.1.2022  Sunny T, et al. N Engl J Med. 2011;364(129):1817-25  Hill SM, et al. Br J Cancer.2016;114(7):737-43  MG Eva, et al. Rin Surg Oncol. 2013;20(8):2787-95    Allergies:Codeine and Hydrocodone  /87   Pulse 99   Temp 36.2 °C (97.1 °F) (Temporal)   Resp 18   Ht 1.5 m (4' 11.06\")   Wt 53.5 kg (117 lb 15.1 oz)   SpO2 95%   BMI 23.78 kg/m²   Body surface area is 1.49 meters squared.    Labs 7/15/22  ANC 8250 Hgb 14.5 Plt 227k  SCr 0.68 CrCl 66 mL/min   AST/ALT/AP = 78/62/209 Tbili = 1.2    OXALIplatin 85 mg/m2 x 1.49 m2 = 126.65 mg   <10% difference, OK to treat with final dose = 126.5 mg IV    Leucovorin 400 mg/m2 x 1.49 m2 = 596 mg   <10% difference, OK to treat with final dose = 596 mg IV    Irinotecan 150 mg/m2 x 1.49 m2 = 223.5 mg   <10% difference, OK to treat with final dose = 223.6 mg IV    Fluorouracil 2400 mg/m2 x 1.49 m2 = 3576 mg   <10% difference, OK to treat with final dose = 3575 mg CIVI over 46 hours @ 1.6 mL/hr    Alena Daly, PharmD, BCOP        "

## 2022-07-15 NOTE — PROGRESS NOTES
Nutrition Services: RD Consultation/ New Chemo Start  Ella Kent is a 66 y.o. female with diagnosis of pancreatic carcinoma, stage IV with liver mets.     Past Medical History:   Diagnosis Date   • Pain     LBP after pancreas issue began       RD met with pt to assess current intake, appetite, and nutritional status.  Pt presents to appointment with .  Pt appears nourished upon visual observation.  Patient with weight loss noted in the last two weeks per chart review which she also confirms to be about 5-6 pounds total. Patient reports that this is related to eating less recently and a low appetite.  She notes no ongoing nausea or vomiting but that she did vomit this morning.  Patient reports she has been struggling with constipation since starting to use Celebrex.  She reports that he has a bowel movement about every 3-4 days at this time. Patient notes that she has Arbonne shakes a few times a week which she makes with almond milk, fruits, greek yogurt and spinach.  Discussed benefits of boosting calories/protein content with nut butters, avocado and greek yogurt.  Discussed likelihood of cold sensitivity and recommended her try shelf stable protein shakes like boost/ensure. Sample and coupons provided.     We discussed nutrition related goals during treatment as well and RD role. We discussed the potential side effects of treatment and their impacts on nutrition. We discussed the benefit of small, frequent meals and snacks focusing on high calorie/protein items as well as the benefits of maintaining weight and lean muscle mass throughout treatment. We discussed increased nutrition needs and hypermetabolic effects of cancer.  Discussed possibility of difficulty digesting high fat items and diarrhea related to this.  At this time she is not having any trouble with this.  Discussed MCT oil.   Provided and went over handouts/food lists regarding a general healthy diet, healthy snack ideas, foods  high in protein and the benefits of a plant based diet limiting red and processed meats.  Pt did not express any further nutrition-related questions or concerns at this time.     Assessment:  • Pertinent Labs 7/15: Potassium: 3.4, Alk Phos: 209  • Pertinent Meds: Phenergan, celebrex, vitamin D, calcium, multi-vitamin  • Chemo: OP Pancreatic mFOLFIRINOX (OXALIplatin + Leucovorin + Irinotecan + Fluorouracil)   • Height: 4'11''  • BMI: 23.78 - normal weight    Weight History from Chart:  Wt Readings from Last 7 Encounters:   07/15/22 53.5 kg (117 lb 15.1 oz)   06/30/22 56.3 kg (124 lb 0.1 oz)   06/30/22 55.3 kg (121 lb 14.6 oz)   06/14/22 56.2 kg (124 lb)   05/20/22 58.5 kg (128 lb 15.5 oz)       Weight Change/Malnutrition Risk:  Weight is down~ 6 pounds in the last 2 weeks, 4.5% which is considered severe.  Patient visually appears thin but nourished. Will continue to monitor for ongoing changes.       Plan/Recommendations:  • Provided and discussed handout regarding general nutrition guidelines as well as nutritional recommendations for patient's with pancreatic cancer, including tips/tricks should side effects of tx occur.   • Increase meal and snack frequency to 4-6 x/day. Encouraged patient to leave shelf stable snacks out.   • Focus on high calorie, fat and protein foods with fruits and vegetables at all meals/snacks - handout provided.  • Incorporate protein shake/smoothie daily. Provided samples and coupons.     Pt reports understanding and was receptive to information provided. RD provided contact information. Encouraged pt to reach out as questions/concerns arise.     RD following and to make further recommendations as indicated.  [phone]

## 2022-07-15 NOTE — PROGRESS NOTES
"Patient Name: Ella Kent     Protocol: Modified FOLFIRINOX       *Dosing Reference*  Oxaliplatin 85 mg/m2 IV over 2 hours on Day 1   Leucovorin 400 mg/m2 IV over 2 hours on Day 1  Irinotecan 150 mg/m2 IV over 90  Minutes (begin 30 minutes after start of leucovorin infusion) on Day 1  Fluorouracil 1200 mg/m2 IV continuous infusion daily on Days 1-2 (2400 mg/m2 IV over 46 hours)  14-day cycle until DP or UT (metastatic)  NCCN Guidelines for Pancreatic Adenocarcinoma V.1.2022.  Sunny T, et al. N Engl J Med. 2018;379(25):4673-7909.  Hill SM, et al. Br J Cancer. 2016;114(7):737-43.    Dx: Pancreatic CA stage IV         Allergies:  Codeine and Hydrocodone     /87   Pulse 99   Temp 36.2 °C (97.1 °F) (Temporal)   Resp 18   Ht 1.5 m (4' 11.06\")   Wt 53.5 kg (117 lb 15.1 oz)   SpO2 95%   BMI 23.78 kg/m²  Body surface area is 1.49 meters squared.     Labs 7/15/22  ANC~ 8250 Plt = 227k   Hgb = 14.5     SCr = 0.68 mg/dL CrCl ~ 66 mL/min (minimum SCr of 0.7)   LFT's = 78/62/209 TBili = 1.2      Drug Order   (Drug name, dose, route, IV Fluid & volume, frequency, number of doses) Cycle: 1      Previous treatment: n/a     Medication = Oxaliplatin  Base Dose= 85 mg/m2  Calc Dose: Base Dose x 1.49 m2 = 126.65 mg  Final Dose = 126.5 mg  Route = IV  Fluid & Volume = D5W 250 mL  Admin Duration = Over 2 hours          <10% difference, okay to treat with final dose   Medication = Leucovorin  Base Dose= 400 mg/m2  Calc Dose: Base Dose x 1.49 m2 = 596 mg  Final Dose = 596 mg  Route = IV  Fluid & Volume = D5W 250 mL  Admin Duration = Over 90 minutes   Run concurrently with irinotecan       <10% difference, okay to treat with final dose   Medication = Irinotecan  Base Dose= 150 mg/m2  Calc Dose:Base Dose x 1.49 m2 = 223.5 mg  Final Dose = 223.6 mg  Route = IV  Fluid & Volume = D5W 500 mL  Admin Duration = Over 90 minutes   Run concurrently with leucovorin       <10% difference, okay to treat with final dose "   Medication = Fluorouracil  Base Dose= 2400 mg/m2  Calc Dose: Base Dose x 1.49 m2 = 3576 mg  Final Dose = 3575 mg  Route = IV  Conc. = 50 mg/mL  Fluid & Volume = 71.5 mL (+3 mL overfill = 74.5 mL)  Admin Duration = Over 46 hours at 1.6 mL/hour          <10% difference, okay to treat with final dose   By my signature below, I confirm this process was performed independently with the BSA and all final chemotherapy dosing calculations congruent. I have reviewed the above chemotherapy order and that my calculation of the final dose and BSA (when applicable) corroborate those calculations of the  pharmacist. Discrepancies of 10% or greater in the written dose have been addressed and documented within the EPIC Progress notes.    Brody Castellano, PharmD

## 2022-07-15 NOTE — PROGRESS NOTES
Chemotherapy Verification - SECONDARY RN       Height = 150 cm  Weight = 53.5 kg  BSA = 1.49 m2       Medication: Oxaliplatin  Dose: 85 mg/m2  Calculated Dose: 126.65 mg                             (In mg/m2, AUC, mg/kg)     Medication: Irinotecan  Dose: 150 mg/m2  Calculated Dose: 223.5 mg                             (In mg/m2, AUC, mg/kg)    Medication: Fluorouracil  Dose: 2,400 mg/m2  Calculated Dose: 3,576 mg                             (In mg/m2, AUC, mg/kg)      I confirm that this process was performed independently.

## 2022-07-15 NOTE — PROGRESS NOTES
Chemotherapy Verification - PRIMARY RN      Height = 150 cm  Weight = 53.5 kg  BSA = 1.49 m^2       Medication: oxaliplatin  Dose: 85 mg/m^2  Calculated Dose: 126.65 mg                             (In mg/m2, AUC, mg/kg)     Medication: irinotecan  Dose: 150 mg/m^2  Calculated Dose: 223.5 mg                             (In mg/m2, AUC, mg/kg)    Medication: fluorouracil  Dose: 2400 mg/m^2  Calculated Dose: 3576 mg                             (In mg/m2, AUC, mg/kg)          I confirm this process was performed independently with the BSA and all final chemotherapy dosing calculations congruent.  Any discrepancies of 10% or greater have been addressed with the chemotherapy pharmacist. The resolution of the discrepancy has been documented in the EPIC progress notes.

## 2022-07-16 NOTE — PROGRESS NOTES
[facility]lla into [facility] for Day 1/ Cycle 1 of mfolfirinox. Pt denied having any new or acute complaints today. Port accessed in a sterile manner, had + blood return, flushed briskly. Videos of CADD pump and neulasta on pro watched by patient and . Answered questions and provided education of potassium rich foods since patient declined potassium replacement today. She states she will consider it if it is low again, but would like to try to fix through diet if possible. Pt given pre-medications, oxaliplatin, leucovorin, and irinotecan as prescribed, tolerated well, denied having any complaints during or after infusion. Port had + blood return after, flushed and attached CADD pump. Demonstrated controls to patient and provided with CADD pump bag and chemo spill kit. Ella denies having any further questions at this time, CADD pump states that it is running. Ella discharged home to care of family.

## 2022-07-18 NOTE — PROGRESS NOTES
Elal arrives to [facility] for a 5FU CADD de-access and Neulasta Onpro application. 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 However, patient reports feeling well overall. Educated on post-chemotherapy care at home.     5FU CADD disconnected: 0 ml residual in cannister, 73.4 ml infused. Port with brisk blood return post-treatment, flushed with 20 ml NS, heparin locked, and de-accessed. Gauze and paper tape applied over site.     Educated on Neulast Onpro. Provided direction on how to remove device once medication has been administered. Neulasta Onpro applied to the back of the right arm, per patient preference.     All questions and concerns answered prior to discharge. Confirmed next appt on 7/29 with patient/spouse. Discharged home to self care in no apparent distress.

## 2022-07-22 NOTE — TELEPHONE ENCOUNTER
Follow-up call made to pt to see how she is doing after 1st week of treatment.  Pt states she has tolerable side effects, mostly loss of appetite.  RN reiterated high calorie/high protein foods like eggs, greek yogurt to help sustain nutrition.  Pt reports an increased gagging reflux with eating, so RN recommended taking an anti-emetic about an hour prior to eating to see if it helped.  Only other symptom pt is experiencing at this time is fatigue.  Pt agreed to call office if she needed anything & stated no further questions.   Odomzo Counseling- I discussed with the patient the risks of Odomzo including but not limited to nausea, vomiting, diarrhea, constipation, weight loss, changes in the sense of taste, decreased appetite, muscle spasms, and hair loss.  The patient verbalized understanding of the proper use and possible adverse effects of Odomzo.  All of the patient's questions and concerns were addressed.

## 2022-07-29 NOTE — PROGRESS NOTES
Nutrition Services: Brief Update  Wt Readings from Last 7 Encounters:   07/29/22 51.3 kg (113 lb 1.5 oz)   07/29/22 51.3 kg (113 lb 3.2 oz)   07/17/22 53.5 kg (117 lb 15.1 oz)   07/15/22 53.5 kg (117 lb 15.1 oz)   06/30/22 56.3 kg (124 lb 0.1 oz)   06/30/22 55.3 kg (121 lb 14.6 oz)   06/14/22 56.2 kg (124 lb)     Weight Change: down 3.4% in the last 2 weeks. Significant.    Pertinent Recent Lab work (DATE 7/29): K+: 2.9    RD able to visit pt and  chairside during infusion appointment today. Patient reports that she has lower appetite for the first week following treatment.  She also experienced constipation during that time but took colace and it was resolved.  She has experienced nausea on and off and takes medications PRN - phenergan, compazine and zofran prescribed.  She has been taking ensure occasionally but not every day.  Her  reports he has bene trying to encourage her to eat frequently. Discussed hydration, high potassium foods, high fat and protein foods as well as importance of weight stability.    Plan/Recommend:  • Encouraged patient to aim for 8 cups of water per day.  • Recommended patient include at least 1 Ensure supplement per day and increase to 2/day if she is skipping a meal.   • Encouraged full fat dairy products like cottage cheese or greek yogurt.     Pt verbalizes understanding and is receptive to information provided. RD to continue to monitor throughout treatment and provide nutrition recommendations as indicated.  Please contact [phone]

## 2022-07-29 NOTE — PROGRESS NOTES
Subjective     Ella Kent is a 66 y.o. female who presents with Cancer (Schwartzberg/Prechemo - pancreatic)            HPI   Ms. Kent presents for evaluation prior to cycle 2 FOLFIRINOX for treatment of stage IV adenocarcinoma of the body/tail of pancreas with multiple liver metastases.  She is accompanied by her  for today's visit.    In Spring 2022 patient noted left lower back pain radiating to abdomen, increased nausea, and unexplained weight loss. She underwent abdominal US and CT scan on 5/13/22 which showed pancreatic mass, liver steatosis. ERCP with biopsies was completed 5/20/22 confirming pathology. She was evaluated by Dr. VAUGHN with lesion possibly being resectable but CT completed 6/29/22 showed multiple hypodense masses in the liver and enlarging pancreatic mass. PET/CT completed 6/29/22 showed numerous foci of increased activity throughout the liver as well as pancreatic body and tail, surgery was aborted and she was advised to follow-up with medical oncology for systemic therapy. She was advised to undergo treatment consisting of FOLFIRINOX and initiated therapy on 7/15/22.    Patient feels she tolerated cycle 1 with minimal issue. She continues with abdominal pain that is not well relieved with Tylenol, will try Celebrex. She experienced nausea on day 2 following treatment that was well relieved with PRN meds. Patient is otherwise at her baseline.      Review of Systems   Constitutional: Positive for weight loss (9# since 6/30; decreased appetite). Negative for chills, fever and malaise/fatigue.   Respiratory: Negative for cough, shortness of breath and wheezing.    Cardiovascular: Negative for chest pain, palpitations and leg swelling.   Gastrointestinal: Positive for abdominal pain (Tylenol PRN), constipation (colace helped) and nausea (day 2 of treatment and PRN). Negative for diarrhea and vomiting.   Genitourinary: Negative for dysuria.   Musculoskeletal: Negative for joint  pain and myalgias.        Platelets are high enough to resume celebrex   Neurological: Positive for tingling (fingers and toes following treatment, better at week 2). Negative for dizziness and headaches.   Psychiatric/Behavioral: The patient does not have insomnia.           Allergies   Allergen Reactions   • Codeine Nausea     Nauseous with most narcotics   • Hydrocodone Nausea           Current Outpatient Medications on File Prior to Encounter   Medication Sig Dispense Refill   • ondansetron (ZOFRAN) 4 MG Tab tablet Take 1 Tablet by mouth every four hours as needed for Nausea/Vomiting. 30 Tablet 6   • prochlorperazine (COMPAZINE) 10 MG Tab Take 1 Tablet by mouth every 6 hours as needed for Nausea/Vomiting. 30 Tablet 6   • loperamide (IMODIUM) 2 MG Cap Take 1 Capsule by mouth as needed for Diarrhea. 30 Capsule 6   • lidocaine-prilocaine (EMLA) 2.5-2.5 % Cream Apply to port one hour prior to access and cover with plastic wrap 30 g 6   • promethazine (PHENERGAN) 25 MG Tab Take 0.5 Tablets by mouth every 6 hours as needed for Nausea/Vomiting. 30 Tablet 0   • acetaminophen (TYLENOL) 500 MG Tab Take 500-1,000 mg by mouth every 6 hours as needed.     • VITAMIN D, ERGOCALCIFEROL, PO Take 1 Tablet by mouth every day.     • Multiple Vitamins-Minerals (MULTIVITAMIN WOMEN PO) Take 1 Tablet by mouth every day.     • Multiple Vitamins-Minerals (EMERGEN-C IMMUNE PLUS PO) Take 1 Dose by mouth every day.     • celecoxib (CELEBREX) 200 MG Cap Take 1 Capsule by mouth 2 times a day. (Patient not taking: Reported on 7/29/2022) 60 Capsule 1   • ibuprofen (MOTRIN) 200 MG Tab Take 200 mg by mouth every 6 hours as needed. (Patient not taking: Reported on 7/29/2022)     • CALCIUM PO Take 1 Tablet by mouth every day. (Patient not taking: Reported on 7/29/2022)       No current facility-administered medications on file prior to encounter.              Objective     /76 (BP Location: Left arm, Patient Position: Sitting, BP Cuff Size:  "Adult)   Pulse 93   Temp 36.3 °C (97.3 °F) (Temporal)   Resp 14   Ht 1.5 m (4' 11.06\")   Wt 51.3 kg (113 lb 3.2 oz)   SpO2 97%   BMI 22.82 kg/m²      Physical Exam  Vitals reviewed.   Constitutional:       General: She is not in acute distress.     Appearance: She is well-developed. She is not diaphoretic.   HENT:      Head: Normocephalic and atraumatic.      Mouth/Throat:      Pharynx: No oropharyngeal exudate.   Eyes:      General: No scleral icterus.        Right eye: No discharge.         Left eye: No discharge.      Conjunctiva/sclera: Conjunctivae normal.      Pupils: Pupils are equal, round, and reactive to light.   Cardiovascular:      Rate and Rhythm: Normal rate and regular rhythm.      Heart sounds: Normal heart sounds. No murmur heard.    No friction rub. No gallop.   Pulmonary:      Effort: Pulmonary effort is normal. No respiratory distress.      Breath sounds: Normal breath sounds. No wheezing.   Abdominal:      General: Bowel sounds are normal. There is no distension.      Palpations: Abdomen is soft.      Tenderness: There is abdominal tenderness.   Musculoskeletal:         General: Normal range of motion.      Cervical back: Normal range of motion and neck supple.   Skin:     General: Skin is warm and dry.      Coloration: Skin is not pale.      Findings: No erythema or rash.   Neurological:      Mental Status: She is alert and oriented to person, place, and time.   Psychiatric:         Behavior: Behavior normal.         [facility] on 07/29/2022   Component Date Value Ref Range Status   • Carcinoembryonic Antigen 07/29/2022 3641.0 (A) 0.0 - 3.0 ng/mL Final    Comment: Results obtained by dilution.  Performed using Roche svetlana e immunoassay analyzer. CEA values determined on  patient samples by different testing procedures cannot be directly compared  with one another and could be the cause of erroneous medical  interpretations. If there is a change in the CEA assay " procedure used while  monitoring therapy, then new baselines may need to be established when  comparing previous results.     • WBC 07/29/2022 25.2 (A) 4.8 - 10.8 K/uL Final   • RBC 07/29/2022 4.37  4.20 - 5.40 M/uL Final   • Hemoglobin 07/29/2022 14.5  12.0 - 16.0 g/dL Final   • Hematocrit 07/29/2022 41.4  37.0 - 47.0 % Final   • MCV 07/29/2022 94.7  81.4 - 97.8 fL Final   • MCH 07/29/2022 33.2 (A) 27.0 - 33.0 pg Final   • MCHC 07/29/2022 35.0  33.6 - 35.0 g/dL Final   • RDW 07/29/2022 51.8 (A) 35.9 - 50.0 fL Final   • Platelet Count 07/29/2022 248  164 - 446 K/uL Final   • MPV 07/29/2022 10.1  9.0 - 12.9 fL Final   • Neutrophils-Polys 07/29/2022 80.20 (A) 44.00 - 72.00 % Final   • Lymphocytes 07/29/2022 9.90 (A) 22.00 - 41.00 % Final   • Monocytes 07/29/2022 5.80  0.00 - 13.40 % Final   • Eosinophils 07/29/2022 0.40  0.00 - 6.90 % Final   • Basophils 07/29/2022 0.60  0.00 - 1.80 % Final   • Immature Granulocytes 07/29/2022 3.10 (A) 0.00 - 0.90 % Final   • Nucleated RBC 07/29/2022 0.20  /100 WBC Final   • Neutrophils (Absolute) 07/29/2022 20.17 (A) 2.00 - 7.15 K/uL Final    Includes immature neutrophils, if present.   • Lymphs (Absolute) 07/29/2022 2.48  1.00 - 4.80 K/uL Final   • Monos (Absolute) 07/29/2022 1.45 (A) 0.00 - 0.85 K/uL Final   • Eos (Absolute) 07/29/2022 0.11  0.00 - 0.51 K/uL Final   • Baso (Absolute) 07/29/2022 0.15 (A) 0.00 - 0.12 K/uL Final   • Immature Granulocytes (abs) 07/29/2022 0.79 (A) 0.00 - 0.11 K/uL Final   • NRBC (Absolute) 07/29/2022 0.06  K/uL Final   • Outpt Infus CBC Comment 07/29/2022 see below   Final    Comment: Per physician request, the automated differential results reported on this  patient have not been verified by a manual method.     • Sodium 07/29/2022 133 (A) 135 - 145 mmol/L Final   • Potassium 07/29/2022 2.9 (A) 3.6 - 5.5 mmol/L Final   • Chloride 07/29/2022 89 (A) 96 - 112 mmol/L Final   • Co2 07/29/2022 25  20 - 33 mmol/L Final   • Anion Gap 07/29/2022 19.0 (A) 7.0 -  16.0 Final   • Glucose 07/29/2022 105 (A) 65 - 99 mg/dL Final   • Bun 07/29/2022 16  8 - 22 mg/dL Final   • Creatinine 07/29/2022 0.53  0.50 - 1.40 mg/dL Final   • Calcium 07/29/2022 9.6  8.5 - 10.5 mg/dL Final   • AST(SGOT) 07/29/2022 100 (A) 12 - 45 U/L Final   • ALT(SGPT) 07/29/2022 55 (A) 2 - 50 U/L Final   • Alkaline Phosphatase 07/29/2022 420 (A) 30 - 99 U/L Final   • Total Bilirubin 07/29/2022 0.9  0.1 - 1.5 mg/dL Final   • Albumin 07/29/2022 3.8  3.2 - 4.9 g/dL Final   • Total Protein 07/29/2022 6.8  6.0 - 8.2 g/dL Final   • Globulin 07/29/2022 3.0  1.9 - 3.5 g/dL Final   • A-G Ratio 07/29/2022 1.3  g/dL Final   • Ca 19-9 07/29/2022 418.0 (A) 0.0 - 35.0 U/mL Final    Comment: Performed using Roche svetlana e immunoassay analyzer.  CA 19 9 values  determined on patient samples by different testing procedures cannot be  directly compared with one another and could be the cause of erroneous  medical interpretations. If there is a change in the CA 19 9 assay procedure  used while monitoring therapy, then new baselines may need to be established  when comparing previous results.     • GFR (CKD-EPI) 07/29/2022 101  >60 mL/min/1.73 m 2 Final    Comment: Effective 3/15/2022, estimated Glomerular Filtration Rate  is calculated using race neutral CKD-EPI 2021 equation  per NKF-ASN recommendations.                  Assessment & Plan     1. Malignant neoplasm determined by biopsy of pancreas (HCC)     2. Weight loss     3. Encounter for long-term current use of high risk medication             1.  Weight loss: Patient has lost 9 pounds since her 6/30 visit.  She is followed closely for nutrition and will try Beneprotein/Bene calorie for additional support.    2.  Pancreatic cancer: Diagnosed 5/20/22; initiated FOLFIRINOX 7/15/22    CBC, CMP have been evaluated and found to be within acceptable months.  Patient will proceed with cycle 2 treatment and return in 2 weeks for evaluation prior to cycle 3, sooner as  needed.    Patient initiated therapy as per Dr. Del Angel while Dr. Leggett was on paternity leave, patient will transition care to Dr. Leggett and appointment with plan.          The patient verbalized agreement and understanding of current plan. All questions and concerns were addressed at time of visit.    Please note that this dictation was created using voice recognition software. I have made every reasonable attempt to correct obvious errors, but I expect that there are errors of grammar and possibly content that I did not discover before finalizing the note.

## 2022-07-29 NOTE — PROGRESS NOTES
Pt ambulatory to Infusion for C2 D1 of FOLFIRINOX for pancreatic cancer.  Pt w/ no s/s of infection, pt has no complaints at this time.  Pt PORT accessed using sterile technique, w/ brisk blood return noted, labs drawn per orders flushed per protocol.  Pt lab results w/n parameters for tx today, but pt's K+ low at 2.9.  Order received from Josey MURRY to give pt 20 mEq PO and 20 mEq IV replacement.  PIV placed in LAC, brisk blood return noted, flushed per protocol.  Pre-meds given prior to therapy.  Potassium chloride and oxaliplatin infused w/ no AE.  1800  Irinotecan and leucovorin infusing.  Report given to Yeimi AUGUSTIN who will finish caring for the pt.  Confirmed pt's next appt.

## 2022-07-29 NOTE — PROGRESS NOTES
Chemotherapy Verification - SECONDARY RN       Height = 146 cm  Weight = 51.3 kg  BSA = 1.46 m2       Medication: Oxaliplatin  Dose: 85 mg/m2  Calculated Dose: 124.1 mg                             (In mg/m2, AUC, mg/kg)     Medication: Irinotecan  Dose: 150 mg/m2  Calculated Dose: 219 mg                             (In mg/m2, AUC, mg/kg)    Medication: Fluorouracil  Dose: 2,400 mg/m2  Calculated Dose: 3504 mg                             (In mg/m2, AUC, mg/kg)      I confirm that this process was performed independently.

## 2022-07-29 NOTE — PROGRESS NOTES
"Pharmacy Chemotherapy Calculations Note:    Patient Name: Ella Kent        Dx: Metastatic Pancreatic Adenocarcinoma w/liver mets  Cycle:  2 Previous treatment: C1 = 7/15/22     Protocol: mFOLFIRINOX  OXALIplatin 85 mg/m2 IV over 2 hours on Day 1  Leucovorin 400 mg/m2 IV over 2 hours on Day 1  Irinotecan 150 mg/m2 IV over 90 minutes on Day 1  Fluorouracil 1200 mg/m2 IV continuous infusion daily on Days 1-2 (2400 mg/m2 IV over 46 hours)   14 day cycle for 4-12 cycles or until disease progression or unacceptable toxicity    NCCN Guidelines for Pancreatic Adenocarcinoma. V.1.2022.  Sunny T, et al. N Engl J Med. 2011;364(129):1817-25.  Hill SM, et al. Br J Cancer.2016;114(7):737-43.  MG Eva, et al. Rin Surg Oncol. 2013;20(8):2787-95.         BP (!) 131/95   Pulse 82   Temp (!) 35.7 °C (96.3 °F) (Temporal)   Resp 18   Ht 1.49 m (4' 10.66\")   Wt 51.3 kg (113 lb 1.5 oz)   SpO2 97%   BMI 23.11 kg/m²  Body surface area is 1.46 meters squared.  ANC~ 33646  Plt = 248 k    SCr = 0.53 mg/dL CrCl = 63 mL/min  LFT's = 100/55/420 TBili = 0.9  No s/sx infxn per RN     OXALIplatin 85 mg/m² x 1.46 m² = 124.1 mg   <10% difference, ok to treat with final written dose = 124 mg    Leucovorin 400 mg/m² x 1.46 m² = 584 mg  <10% difference, ok to treat with final written dose = 584 mg    Fluorouracil 2400 mg/m² x 1.46 m² = 3504 mg  <10% difference, ok to treat with final written dose = 3505 mg CIVI via CADD over 46 hours    Irinotecan 150 mg/m² x 1.46 m² = 219 mg  <10% difference, ok to treat with final written dose = 219 mg    Cornelio Esparza, PharmD BCPS           "

## 2022-07-29 NOTE — PROGRESS NOTES
"Patient Name: Ella Kent     Protocol: Modified FOLFIRINOX       *Dosing Reference*  Oxaliplatin 85 mg/m2 IV over 2 hours on Day 1   Leucovorin 400 mg/m2 IV over 2 hours on Day 1  Irinotecan 150 mg/m2 IV over 90  Minutes (begin 30 minutes after start of leucovorin infusion) on Day 1  Fluorouracil 1200 mg/m2 IV continuous infusion daily on Days 1-2 (2400 mg/m2 IV over 46 hours)  14-day cycle until DP or UT (metastatic)  NCCN Guidelines for Pancreatic Adenocarcinoma V.1.2022.  Sunny T, et al. N Engl J Med. 2018;379(25):4477-0052.  Hill SM, et al. Br J Cancer. 2016;114(7):737-43.    Dx: Pancreatic CA stage IV         Allergies:  Codeine and Hydrocodone     BP (!) 131/95   Pulse 82   Temp (!) 35.7 °C (96.3 °F) (Temporal)   Resp 18   Ht 1.49 m (4' 10.66\")   Wt 51.3 kg (113 lb 1.5 oz)   SpO2 97%   BMI 23.11 kg/m²  Body surface area is 1.46 meters squared.     Labs 7/29/22  ANC~ 42671 Plt = 248k   Hgb = 14.5     SCr = 0.53 mg/dL CrCl ~ 63.3 mL/min (minimum SCr of 0.7)   LFT's = 100/55/420 TBili = 0.9      Drug Order   (Drug name, dose, route, IV Fluid & volume, frequency, number of doses) Cycle: 2      Previous treatment: C1 on 7/15/22     Medication = Oxaliplatin  Base Dose= 85 mg/m2  Calc Dose: Base Dose x 1.46 m2 = 124.1 mg  Final Dose = 124 mg  Route = IV  Fluid & Volume = D5W 250 mL  Admin Duration = Over 2 hours          <10% difference, okay to treat with final dose   Medication = Leucovorin  Base Dose= 400 mg/m2  Calc Dose: Base Dose x 1.46 m2 = 584 mg  Final Dose = 584 mg  Route = IV  Fluid & Volume = D5W 250 mL  Admin Duration = Over 90 minutes   Run concurrently with irinotecan       <10% difference, okay to treat with final dose   Medication = Irinotecan  Base Dose= 150 mg/m2  Calc Dose:Base Dose x 1.46 m2 = 219 mg  Final Dose = 219 mg  Route = IV  Fluid & Volume = D5W 500 mL  Admin Duration = Over 90 minutes   Run concurrently with leucovorin       <10% difference, okay to treat with final " dose   Medication = Fluorouracil  Base Dose= 2400 mg/m2  Calc Dose: Base Dose x 1.46 m2 = 3504 mg  Final Dose = 3505 mg  Route = IV  Conc. = 50 mg/mL  Fluid & Volume = 70.1 mL (+3 mL overfill = 73.1 mL)  Admin Duration = Over 46 hours at 1.5 mL/hour          <10% difference, okay to treat with final dose   By my signature below, I confirm this process was performed independently with the BSA and all final chemotherapy dosing calculations congruent. I have reviewed the above chemotherapy order and that my calculation of the final dose and BSA (when applicable) corroborate those calculations of the  pharmacist. Discrepancies of 10% or greater in the written dose have been addressed and documented within the EPIC Progress notes.    Brody Castellano, PharmD

## 2022-07-29 NOTE — PROGRESS NOTES
Chemotherapy Verification - PRIMARY RN  C2 D1      Height = 1.49 m  Weight = 51.3 kg  BSA = 1.46 m2       Medication: oxaliplatin  Dose: 85 mg/m2  Calculated Dose: 124.1 mg                             (In mg/m2, AUC, mg/kg)     Medication: irinotecan  Dose: 150 mg/m2  Calculated Dose: 219 mg                             (In mg/m2, AUC, mg/kg)    Medication: fluorouracil CADD pump  Dose: 2400 mg/m2  Calculated Dose: 3504 mg over 46 hours                             (In mg/m2, AUC, mg/kg)        I confirm this process was performed independently with the BSA and all final chemotherapy dosing calculations congruent.  Any discrepancies of 10% or greater have been addressed with the chemotherapy pharmacist. The resolution of the discrepancy has been documented in the EPIC progress notes.

## 2022-07-30 NOTE — PROGRESS NOTES
Assumed care of patient from [place]. No s/s of infusion reaction to the leucovorin or oxaliplatin. CADD pump attached to patient and in run mode. Blood return verified. Patient to home in care of spouse.

## 2022-08-01 NOTE — PROGRESS NOTES
Patient arrived for CADD pump de-access/Onpro.   accompanied. Pt reports fatigue but tolerating treatment; increased ensure and nutrition helping per pt.  CADD pump running on low, verified amount remaining.  Verified with pharmacy ok to disconnect as patient received 98% of infusion.  CADD pump stopped, disconnected. Port flushed with blood return and heparin locked; de-accessed port and gauze dressing in place.  OnPro initiated and connected to patient to R back arm, confirmed green light blinking and marked at full. Confirmed pts next appt; discussed nutrition and fatigue management, emotional support provided. Pt discharged home in good spirits under no apparent distress.

## 2022-08-03 PROBLEM — M81.0 OSTEOPOROSIS: Status: ACTIVE | Noted: 2022-01-01

## 2022-08-03 PROBLEM — N95.2 ATROPHIC VAGINITIS: Status: ACTIVE | Noted: 2022-01-01

## 2022-08-03 PROBLEM — K21.9 GASTROESOPHAGEAL REFLUX DISEASE WITHOUT ESOPHAGITIS: Status: ACTIVE | Noted: 2022-01-01

## 2022-08-03 PROBLEM — R10.13 EPIGASTRIC PAIN: Status: ACTIVE | Noted: 2022-01-01

## 2022-08-12 NOTE — PROGRESS NOTES
"Patient Name: Ella Kent     Protocol: Modified FOLFIRINOX       *Dosing Reference*  Oxaliplatin 85 mg/m2 IV over 2 hours on Day 1   Leucovorin 400 mg/m2 IV over 2 hours on Day 1  Irinotecan 150 mg/m2 IV over 90  Minutes (begin 30 minutes after start of leucovorin infusion) on Day 1  Fluorouracil 1200 mg/m2 IV continuous infusion daily on Days 1-2 (2400 mg/m2 IV over 46 hours)  14-day cycle until DP or UT (metastatic)  NCCN Guidelines for Pancreatic Adenocarcinoma V.1.2022.  Sunny T, et al. N Engl J Med. 2018;379(25):3062-2164.  Hill SM, et al. Br J Cancer. 2016;114(7):737-43.    Dx: Pancreatic CA stage IV         Allergies:  Codeine and Hydrocodone     BP (!) 132/92   Pulse (!) 102   Temp 35.9 °C (96.7 °F) (Temporal)   Resp 18   Ht 1.49 m (4' 10.66\")   Wt 49.2 kg (108 lb 7.5 oz)   SpO2 95%   BMI 22.16 kg/m²  Body surface area is 1.43 meters squared.     Labs 8/12/22  ANC~ 95318 Plt = 276k   Hgb = 14.6     SCr = 0.64 mg/dL CrCl ~ 60.7 mL/min (minimum SCr of 0.7)   LFT's = 81/54/475 TBili = 0.7     Drug Order   (Drug name, dose, route, IV Fluid & volume, frequency, number of doses) Cycle: 3  Previous treatment: C2 on 7/29/22     Medication = Oxaliplatin  Base Dose= 85 mg/m2  Calc Dose: Base Dose x 1.43 m2 = 121.55 mg  Final Dose = 121.5 mg  Route = IV  Fluid & Volume = D5W 250 mL  Admin Duration = Over 2 hours          <10% difference, okay to treat with final dose   Medication = Leucovorin  Base Dose= 400 mg/m2  Calc Dose: Base Dose x 1.43 m2 = 572 mg  Final Dose = 572 mg  Route = IV  Fluid & Volume = D5W 250 mL  Admin Duration = Over 90 minutes   Run concurrently with irinotecan       <10% difference, okay to treat with final dose   Medication = Irinotecan  Base Dose= 150 mg/m2  Calc Dose:Base Dose x 1.43 m2 = 214.5 mg  Final Dose = 214.6 mg  Route = IV  Fluid & Volume = D5W 500 mL  Admin Duration = Over 90 minutes   Run concurrently with leucovorin       <10% difference, okay to treat with " final dose   Medication = Fluorouracil  Base Dose= 2400 mg/m2  Calc Dose: Base Dose x 1.43 m2 = 3432 mg  Final Dose = 3430 mg  Route = IV  Conc. = 50 mg/mL  Fluid & Volume = 68.6 mL (+3 mL overfill = 71.6 mL)  Admin Duration = Over 46 hours at 1.5 mL/hour          <10% difference, okay to treat with final dose   By my signature below, I confirm this process was performed independently with the BSA and all final chemotherapy dosing calculations congruent. I have reviewed the above chemotherapy order and that my calculation of the final dose and BSA (when applicable) corroborate those calculations of the  pharmacist. Discrepancies of 10% or greater in the written dose have been addressed and documented within the EPIC Progress notes.    Brody Castellano, PharmD

## 2022-08-12 NOTE — PROGRESS NOTES
Chemotherapy Verification - SECONDARY RN       Height = 1.49m  Weight = 49.2kg  BSA = 1.43m2       Medication: oxaliplatin  Dose: 85mg/m2  Calculated Dose: 121.5mg                             (In mg/m2, AUC, mg/kg)     Medication: irinotecan  Dose: 150mg/m2  Calculated Dose: 214.5mg                             (In mg/m2, AUC, mg/kg)    Medication: leucovorin  Dose: 400mg/m2  Calculated Dose: 572mg                             (In mg/m2, AUC, mg/kg)    Medication: fluorouracil  Dose: 2400mg/m2  Calculated Dose: 3432mg                             (In mg/m2, AUC, mg/kg)    I confirm that this process was performed independently.

## 2022-08-12 NOTE — PROGRESS NOTES
Chemotherapy Verification - PRIMARY RN      Height = 149 cm  Weight = 49.2 kg  BSA = 1.43 m2     Cycle 3 Day 1    Medication: Oxaliplatin  Dose: 85 mg/m2    Calculated Dose: 121.6 mg                            Medication: Irinotecan  Dose: 150 mg/m2    Calculated Dose: 214.5 mg                            Medication: Leucovorin  Dose: 400 mg/m2    Calculated Dose: 572 mg                               Medication: Fluorouracil  Dose: 2,400 mg/m2    Calculated Dose: 3,432 mg                                 I confirm this process was performed independently with the BSA and all final chemotherapy dosing calculations congruent.  Any discrepancies of 10% or greater have been addressed with the chemotherapy pharmacist. The resolution of the discrepancy has been documented in the EPIC progress notes.

## 2022-08-12 NOTE — PROGRESS NOTES
Nutrition Services: Brief Update  Wt Readings from Last 7 Encounters:   22 49.2 kg (108 lb 7.5 oz)   22 49.3 kg (108 lb 9.6 oz)   22 51.3 kg (113 lb 3.2 oz)   22 51.3 kg (113 lb 1.5 oz)   22 53.5 kg (117 lb 15.1 oz)   07/15/22 53.5 kg (117 lb 15.1 oz)   22 56.3 kg (124 lb 0.1 oz)     Weight Change: 2.1 kg loss within past 2 weeks. This is a 4% loss, which meets criteria for severity.     Pertinent Recent Lab work (DATE 22): K now WNL, glucose 136, ALT 54, AST 81, alklaine phosphatase 475    RD able to visit pt at chairside in [facility]. Pt presents with spouse. Pt states has had an increase in appetite, which generally occurs in the second week following her cycles. States during the first week is mainly having snacks and resumes to relatively normal eating in the second week. Pt pleased to hear that potassium is WNL. States had a baked potato and drinks V8 in an effort to maintain this now. Confirms is continuing with 1 Ensure Complete almost daily. Spouse discusses reinforcement of a few bites of each snack or meal provided to help meet nutrition needs.      Brief Nutrition Assessment  REE per MSJ x 1.3-1.4 = 1202 - 1300 kcal (1400 kcal desirable goal./day)  Protein: 1.3g+/k grams protein per day  Total fluids (30mL/kg or 1mL/kcal or [place]) 1295-3965 mL/day    Plan/Recommend:  RD supportive of small bites with each food or beverage offered. Discussed viewing eating as a part of treatment and normalcy of lack of appetite while on treatment.   Provided high calorie/protein add in sheet. Together, RD and pt reviewed ways to easily add more calories to beverages or snacks without increases in volume. Discussed maximizing calories with every bite or sip.     Pt verbalizes understanding and is receptive to information provided. RD to continue to monitor throughout treatment and provide nutrition recommendations as indicated.  Please contact -[phone]

## 2022-08-12 NOTE — PROGRESS NOTES
"Pharmacy Chemotherapy Calculations Note:    Patient Name: Ella Kent        Dx: Metastatic Pancreatic Adenocarcinoma w/liver mets  Cycle:  3 Previous treatment: C2 = 7/29/22     Protocol: mFOLFIRINOX  OXALIplatin 85 mg/m2 IV over 2 hours on Day 1  Leucovorin 400 mg/m2 IV over 2 hours on Day 1  Irinotecan 150 mg/m2 IV over 90 minutes on Day 1  Fluorouracil 1200 mg/m2 IV continuous infusion daily on Days 1-2 (2400 mg/m2 IV over 46 hours)   14 day cycle for 4-12 cycles or until disease progression or unacceptable toxicity    NCCN Guidelines for Pancreatic Adenocarcinoma. V.1.2022.  Sunny T, et al. N Engl J Med. 2011;364(129):1817-25.  Hill SM, et al. Br J Cancer.2016;114(7):737-43.  MG Eva, et al. Rin Surg Oncol. 2013;20(8):2787-95.         BP (!) 132/92   Pulse (!) 102   Temp 35.9 °C (96.7 °F) (Temporal)   Resp 18   Ht 1.49 m (4' 10.66\")   Wt 49.2 kg (108 lb 7.5 oz)   SpO2 95%   BMI 22.16 kg/m²  Body surface area is 1.43 meters squared.    Labs 8/12/22::  ANC~ 64472 Plt = 276k   Hgb = 14.6     SCr = 0.64mg/dL CrCl ~ 60.67mL/min (min Scr 0.7 used)  AST/ALT/ALP= 81/54/475 TBili = 0.7     MD Office informed      OXALIplatin 85 mg/m² x 1.43 m² = 121.55 mg   <10% difference, ok to treat with final written dose = 121.5 mg    Leucovorin 400 mg/m² x 1.43 m² =572 mg  <10% difference, ok to treat with final written dose = 572 mg    Fluorouracil 2400 mg/m² x 1.43 m² = 3432 mg  <10% difference, ok to treat with final written dose = 3430 mg CIVI via CADD over 46 hours    Irinotecan 150 mg/m² x 1.43 m² = 214.5 mg  <10% difference, ok to treat with final written dose = 214.6  mg    Sheri Duran, PharmD, BCPS             "

## 2022-08-13 NOTE — PROGRESS NOTES
Patient presented to [facility] for C3D1 FOLFIRINOX.   Right chest wall port accessed in sterile fashion.  Flushed easily, viet briskly. Pre-treatment labs drawn. Results reviewed and were within treatable parameters. Patient received pre-meds as ordered.  Oxaliplatin infused over 2 hours. Irinotecan & and Leucovorin infused concurrently over 90 minutes. custodial through the Irinotecan infusion, Patient reported feeling nauseated. Compazine and Atropine given with good relief of nausea.  46 hour Fluoruracil CADD Pump connected. Patient verbalized a good understanding of use. Patient left \[facility] [place] [facility]\"" in no apparent distress. Next appointment time confirmed prior to departure.

## 2022-08-13 NOTE — PROGRESS NOTES
Follow Up Note:  Hematology/Oncology      Primary Care:  Misael Heredia M.D.    Diagnosis: Pancreatic adenocarcinoma    Chief Complaint: On-treatment visit    Current Treatment: FOLFIRINOX    Prior Treatment: NA    Oncology History of Presenting Illness:  Ella Kent is a 66 y.o.  woman who presents to the clinic for evaluation for systemic therapy for a diagnosis of metastatic pancreatic adenocarcinoma, arising from the body and tail of the pancreas. She had noticed pain in her lower back that radiated to the abdomen, along with weight loss and and nausea. She eventually was sent for an US and subsequent Ct scan which showed a pancreatic mass. She then had an ERCP and biopsy in May 2022 which confirmed the diagnosis. She was initially evaluated for surgical management but a follow up CT at the end of June revealed metastatic disease in the liver. She was subsequently referred to medical oncology for evaluation.     Treatment History:   07/15/22: C1D1 FOLFIRINOX  07/29/22: C2D1 FOLFIRINOX  08/12/22: C3D1 FOLFIRINOX    Interval History:  Patient is here for follow up visit.  This is her first time establishing with me, but she has seen my colleagues previously. She is doing okay overall, but has some pain in her mid epigastric area that is radiating to her back. She has taken celecoxib for this and it has worked to a degree, but she has been hesitant to take stronger pain medication due to concerns about nausea with opioids. She does feel fatigued at times and has had difficulty with eating at times as well. She is overall doing okay though. Her  is with her at today's visit.     Allergies as of 08/12/2022 - Reviewed 08/12/2022   Allergen Reaction Noted    Codeine Nausea 05/19/2022    Hydrocodone Nausea 05/19/2022         Current Outpatient Medications:     ondansetron (ZOFRAN) 4 MG Tab tablet, Take 1 Tablet by mouth every four hours as needed for Nausea/Vomiting., Disp: 30 Tablet, Rfl: 6     prochlorperazine (COMPAZINE) 10 MG Tab, Take 1 Tablet by mouth every 6 hours as needed for Nausea/Vomiting., Disp: 30 Tablet, Rfl: 6    loperamide (IMODIUM) 2 MG Cap, Take 1 Capsule by mouth as needed for Diarrhea., Disp: 30 Capsule, Rfl: 6    lidocaine-prilocaine (EMLA) 2.5-2.5 % Cream, Apply to port one hour prior to access and cover with plastic wrap, Disp: 30 g, Rfl: 6    promethazine (PHENERGAN) 25 MG Tab, Take 0.5 Tablets by mouth every 6 hours as needed for Nausea/Vomiting., Disp: 30 Tablet, Rfl: 0    acetaminophen (TYLENOL) 500 MG Tab, Take 500-1,000 mg by mouth every 6 hours as needed., Disp: , Rfl:     VITAMIN D, ERGOCALCIFEROL, PO, Take 1 Tablet by mouth every day., Disp: , Rfl:     Multiple Vitamins-Minerals (MULTIVITAMIN WOMEN PO), Take 1 Tablet by mouth every day., Disp: , Rfl:     Multiple Vitamins-Minerals (EMERGEN-C IMMUNE PLUS PO), Take 1 Dose by mouth every day., Disp: , Rfl:     celecoxib (CELEBREX) 200 MG Cap, Take 1 Capsule by mouth 2 times a day. (Patient not taking: No sig reported), Disp: 60 Capsule, Rfl: 1    ibuprofen (MOTRIN) 200 MG Tab, Take 200 mg by mouth every 6 hours as needed. (Patient not taking: No sig reported), Disp: , Rfl:     CALCIUM PO, Take 1 Tablet by mouth every day. (Patient not taking: No sig reported), Disp: , Rfl:       Review of Systems:  Review of Systems   Constitutional:  Positive for malaise/fatigue. Negative for chills, diaphoresis, fever and weight loss.   HENT:  Negative for hearing loss, nosebleeds, sinus pain and sore throat.    Eyes:  Negative for blurred vision and double vision.   Respiratory:  Negative for cough, sputum production, shortness of breath and wheezing.    Cardiovascular:  Negative for chest pain, palpitations, orthopnea and leg swelling.   Gastrointestinal:  Positive for abdominal pain. Negative for blood in stool, constipation, diarrhea, heartburn, melena, nausea and vomiting.   Genitourinary:  Negative for dysuria, frequency, hematuria and  "urgency.   Musculoskeletal:  Positive for back pain. Negative for joint pain and myalgias.   Skin:  Negative for rash.   Neurological:  Negative for dizziness, tingling, weakness and headaches.   Endo/Heme/Allergies:  Does not bruise/bleed easily.   Psychiatric/Behavioral:  The patient is not nervous/anxious and does not have insomnia.        Physical Exam:  Vitals:    08/12/22 0951   BP: 122/82   BP Location: Left arm   Patient Position: Sitting   BP Cuff Size: Adult   Pulse: 96   Resp: 16   Temp: 36.1 °C (96.9 °F)   TempSrc: Temporal   SpO2: 97%   Weight: 49.3 kg (108 lb 9.6 oz)   Height: 1.49 m (4' 10.66\")       DESC; KARNOFSKY SCALE WITH ECOG EQUIVALENT: 100, Fully active, able to carry on all pre-disease performed without restriction (ECOG equivalent 0)    DISTRESS LEVEL: no apparent distress    Physical Exam  Vitals and nursing note reviewed.   Constitutional:       General: She is awake. She is not in acute distress.     Appearance: Normal appearance. She is normal weight. She is not ill-appearing, toxic-appearing or diaphoretic.   HENT:      Head: Normocephalic and atraumatic.      Nose: Nose normal. No congestion.      Mouth/Throat:      Pharynx: Oropharynx is clear. No oropharyngeal exudate or posterior oropharyngeal erythema.   Eyes:      General: No scleral icterus.     Extraocular Movements: Extraocular movements intact.      Conjunctiva/sclera: Conjunctivae normal.      Pupils: Pupils are equal, round, and reactive to light.   Cardiovascular:      Rate and Rhythm: Normal rate and regular rhythm.      Pulses: Normal pulses.      Heart sounds: Normal heart sounds. No murmur heard.    No friction rub. No gallop.   Pulmonary:      Effort: Pulmonary effort is normal.      Breath sounds: Normal breath sounds. No decreased air movement. No wheezing, rhonchi or rales.   Abdominal:      General: Bowel sounds are normal. There is no distension.      Tenderness: There is abdominal tenderness in the epigastric " area.   Musculoskeletal:         General: No deformity. Normal range of motion.      Cervical back: Normal range of motion and neck supple. No tenderness.      Right lower leg: No edema.      Left lower leg: No edema.   Lymphadenopathy:      Cervical: No cervical adenopathy.      Upper Body:      Right upper body: No axillary adenopathy.      Left upper body: No axillary adenopathy.      Lower Body: No right inguinal adenopathy. No left inguinal adenopathy.   Skin:     General: Skin is warm and dry.      Coloration: Skin is not jaundiced.      Findings: No erythema or rash.   Neurological:      General: No focal deficit present.      Mental Status: She is alert and oriented to person, place, and time.      Sensory: Sensation is intact.      Motor: Motor function is intact. No weakness.      Gait: Gait is intact.   Psychiatric:         Attention and Perception: Attention normal.         Mood and Affect: Mood normal.         Behavior: Behavior normal. Behavior is cooperative.         Thought Content: Thought content normal.         Judgment: Judgment normal.         Labs:  [facility] 08/12/2022   Component Date Value Ref Range Status    Carcinoembryonic Antigen 08/12/2022 3821.0 (A) 0.0 - 3.0 ng/mL Final    Comment: Results obtained by dilution.  Performed using Roche svetlana e immunoassay analyzer. CEA values determined on  patient samples by different testing procedures cannot be directly compared  with one another and could be the cause of erroneous medical  interpretations. If there is a change in the CEA assay procedure used while  monitoring therapy, then new baselines may need to be established when  comparing previous results.      WBC 08/12/2022 33.7 (A) 4.8 - 10.8 K/uL Final    RBC 08/12/2022 4.32  4.20 - 5.40 M/uL Final    Hemoglobin 08/12/2022 14.6  12.0 - 16.0 g/dL Final    Hematocrit 08/12/2022 42.8  37.0 - 47.0 % Final    MCV 08/12/2022 99.1 (A) 81.4 - 97.8 fL Final    MCH 08/12/2022  33.8 (A) 27.0 - 33.0 pg Final    MCHC 08/12/2022 34.1  33.6 - 35.0 g/dL Final    RDW 08/12/2022 57.4 (A) 35.9 - 50.0 fL Final    Platelet Count 08/12/2022 276  164 - 446 K/uL Final    MPV 08/12/2022 10.1  9.0 - 12.9 fL Final    Neutrophils-Polys 08/12/2022 79.10 (A) 44.00 - 72.00 % Final    Lymphocytes 08/12/2022 6.70 (A) 22.00 - 41.00 % Final    Monocytes 08/12/2022 6.90  0.00 - 13.40 % Final    Eosinophils 08/12/2022 0.80  0.00 - 6.90 % Final    Basophils 08/12/2022 0.20  0.00 - 1.80 % Final    Immature Granulocytes 08/12/2022 6.30 (A) 0.00 - 0.90 % Final    Nucleated RBC 08/12/2022 0.50  /100 WBC Final    Neutrophils (Absolute) 08/12/2022 26.70 (A) 2.00 - 7.15 K/uL Final    Includes immature neutrophils, if present.    Lymphs (Absolute) 08/12/2022 2.25  1.00 - 4.80 K/uL Final    Monos (Absolute) 08/12/2022 2.31 (A) 0.00 - 0.85 K/uL Final    Eos (Absolute) 08/12/2022 0.27  0.00 - 0.51 K/uL Final    Baso (Absolute) 08/12/2022 0.07  0.00 - 0.12 K/uL Final    Immature Granulocytes (abs) 08/12/2022 2.12 (A) 0.00 - 0.11 K/uL Final    NRBC (Absolute) 08/12/2022 0.17  K/uL Final    Outpt [facility] CBC Comment 08/12/2022 see below   Final    Comment: Per physician request, the automated differential results reported on this  patient have not been verified by a manual method.      Sodium 08/12/2022 137  135 - 145 mmol/L Final    Potassium 08/12/2022 3.8  3.6 - 5.5 mmol/L Final    Chloride 08/12/2022 96  96 - 112 mmol/L Final    Co2 08/12/2022 25  20 - 33 mmol/L Final    Anion Gap 08/12/2022 16.0  7.0 - 16.0 Final    Glucose 08/12/2022 136 (A) 65 - 99 mg/dL Final    Bun 08/12/2022 22  8 - 22 mg/dL Final    Creatinine 08/12/2022 0.64  0.50 - 1.40 mg/dL Final    Calcium 08/12/2022 9.7  8.5 - 10.5 mg/dL Final    AST(SGOT) 08/12/2022 81 (A) 12 - 45 U/L Final    ALT(SGPT) 08/12/2022 54 (A) 2 - 50 U/L Final    Alkaline Phosphatase 08/12/2022 475 (A) 30 - 99 U/L Final    Total Bilirubin 08/12/2022 0.7  0.1 - 1.5 mg/dL Final    Albumin  08/12/2022 3.9  3.2 - 4.9 g/dL Final    Total Protein 08/12/2022 7.1  6.0 - 8.2 g/dL Final    Globulin 08/12/2022 3.2  1.9 - 3.5 g/dL Final    A-G Ratio 08/12/2022 1.2  g/dL Final    Ca 19-9 08/12/2022 312.0 (A) 0.0 - 35.0 U/mL Final    Comment: Performed using Roche svetlana e immunoassay analyzer.  CA 19 9 values  determined on patient samples by different testing procedures cannot be  directly compared with one another and could be the cause of erroneous  medical interpretations. If there is a change in the CA 19 9 assay procedure  used while monitoring therapy, then new baselines may need to be established  when comparing previous results.      GFR (CKD-EPI) 08/12/2022 97  >60 mL/min/1.73 m 2 Final    Comment: Effective 3/15/2022, estimated Glomerular Filtration Rate  is calculated using race neutral CKD-EPI 2021 equation  per NKF-ASN recommendations.         Imaging:     All listed images below have been independently reviewed by me. I agree with the findings as summarized below:    No results found.    Pathology:  FINAL DIAGNOSIS:     A. Gastric antrum biopsy:          Fragments of benign gastric mucosa demonstrate scattered mild           chronic inflammation and reactive changes.          Warthin-Starry stain negative for Helicobacter-like organisms.          No intestinal metaplasia or malignancy identified.   B. Biopsy slide - pancreas body mass:          Positive for malignant cells, most consistent with           adenocarcinoma.   C. Biopsy core - pancreas body mass:          Biopsy cores demonstrate invasive moderately differentiated           adenocarcinoma.     Comment: Pertinent slides from specimen B and in specimen C were also   reviewed by another pathologist, Dr. Martinez, who agreed with the above   interpretation. There is a sufficient amount of tissue for some   ancillary studies in paraffin block C1.                                         Diagnosis performed by:                                        TIFFANIE COOK MD    Assessment & Plan:  1. Pancreatic carcinoma (HCC)  Referral to Other      2. Malignant neoplasm determined by biopsy of pancreas (HCC)            This is a 66 year old  woman with metastatic pancreatic adenocarcinoma. She has started treatment with FOLFIRINOX.     Current Diagnosis and Staging: Pancreatic adenocarcinoma, cTxNxM1 stage IV disease    Update: Patient is doing well thus far with therapy. We will continue with C3 of FOLFIRINOX. We are awaiting NGS test results and I have also placed a referral to pain management for consideration of celiac plexus nerve block. We discussed genetic testing today, but the patient would like to wait to make that decision.     Treatment Plan: FOLFIRINOX    Treatment Citation: PRODIGE trial, [facility] 2011    Plan of Care:    Primary Therapy: FOLFIRINOX C3D1 today  Supportive Therapy: Antiemetics per protocol   Labs: CBC with diff, CMP, CA 19-9, CEA with each cycle  Imaging: Repeat CT scans after C6  Treatment Planning: Patient to continue with FOLFIRINOX. Referral placed for pain management as well. NGS testing pending. Genetics testing offered but patient deferred at this time, and wants to wait to make a decision.   Consultations: Surgical oncology (Dr. Rivers), Pain management (Dr. Eduardo)  Code Status: Full  Miscellaneous: Await NGS testing  Return for Follow Up: 2 weeks for C4 of therapy    Any questions and concerns raised by the patient were answered to the best of my ability. Thank you for allowing me to participate in the care for this patient. Please feel free to contact me for any questions or concerns.       Total time spent on chart review, clinic encounter, and documentation: 29 minutes.

## 2022-08-15 NOTE — PROGRESS NOTES
Pt arrived ambulatory to [facility] for CADD pump disconnect and On-Pro application. POC discussed with pt and she agrees with plan.     Lab results reviewed from 8/12/2022, WBC 33.7, Ana Kim APRN contacted to clarify On-Pro administration with WBC 33.7. orders recieced to proceed with On-Pro.     CADD pump volume reads zero. Port with brisk blood return noted, flushed with NS then heparin. Port de-accessed, gauze dressing applied. On-Pro applied to right back arm. Pt discharged to [facility] care, [facility]. Pt's next appt confirmed 8/26/2022.

## 2022-08-26 NOTE — PROGRESS NOTES
into [facility] for Cycle 4 of FOLFIRINOX for Pancreatic carcinoma. Pt denied having any new or acute complaints today, reports tolerating past treatments well. Port accessed in a sterile manner, had + blood return, flushed briskly.Talked to Emile MURRY regarding elevated WBC, okay to proceed with treatment, they'll hold Neulasta OnPro. PO Potasium given per replacement protocol. Pt given anticancer therapy as prescribed, tolerated well, denied having any complaints during or after infusion. Port had + blood return after, flushed per [facility] policy, left accessed, for home infusion. CADD Pump connected at 16:20. Discharge home to self care with  in NAD. Appointment confirm for next treatment.

## 2022-08-26 NOTE — PROGRESS NOTES
Chemotherapy Verification - PRIMARY RN      Height = 58.27 in  Weight = 102 lb  BSA = 1.38 m2       Medication: Oxaliplatin  Dose: 6589 mg/m2  Calculated Dose: 89.7 mg                             (In mg/m2, AUC, mg/kg)     Medication: Leucovorin  Dose: 400 mg/m2  Calculated Dose: 552 mg                             (In mg/m2, AUC, mg/kg)    Medication: Irinotecan  Dose: 150 mg/m2  Calculated Dose: 207 mg                             (In mg/m2, AUC, mg/kg)    Medication: 5 FU CADD Pump  Dose: 2,400 mg/m2  Calculated Dose: 3,312 mg / 46 hrs of continuous infusion                            (In mg/m2, AUC, mg/kg)    I confirm this process was performed independently with the BSA and all final chemotherapy dosing calculations congruent.  Any discrepancies of 10% or greater have been addressed with the chemotherapy pharmacist. The resolution of the discrepancy has been documented in the EPIC progress notes.

## 2022-08-26 NOTE — PROGRESS NOTES
Chemotherapy Verification - SECONDARY RN       Height = 148 cm  Weight = 46.5 kg  BSA = 1.38 m2       Medication: Oxaliplatin  Dose: 65 mg/m2  Calculated Dose: 89.7 mg                             (In mg/m2, AUC, mg/kg)     Medication: Irinotecan  Dose: 150 mg/m2  Calculated Dose: 207 mg                             (In mg/m2, AUC, mg/kg)    Medication: 5FU CADD pump  Dose: 2400 mg/m2 over 46 hours  Calculated Dose: 3312 mg                             (In mg/m2, AUC, mg/kg)        I confirm that this process was performed independently.

## 2022-08-26 NOTE — PROGRESS NOTES
"Patient Name: Ella Kent     Protocol: Modified FOLFIRINOX       *Dosing Reference*  Oxaliplatin  IV over 2 hours on Day 1    8/26/22 - Dose reduced to 65 mg/m2 for neuropathy per Dr. Leggett  Leucovorin 400 mg/m2 IV over 2 hours on Day 1  Irinotecan 150 mg/m2 IV over 90  Minutes (begin 30 minutes after start of leucovorin infusion) on Day 1  Fluorouracil 1200 mg/m2 IV continuous infusion daily on Days 1-2 (2400 mg/m2 IV over 46 hours)  14-day cycle until DP or UT (metastatic)  NCCN Guidelines for Pancreatic Adenocarcinoma V.1.2022.  Sunny T, et al. N Engl J Med. 2018;379(25):6308-1429.  Hill SM, et al. Br J Cancer. 2016;114(7):737-43.    Dx: Pancreatic CA stage IV         Allergies:  Codeine and Hydrocodone     BP (!) 129/94   Pulse (!) 18   Temp 36.1 °C (97 °F) (Temporal)   Resp 18   Ht 1.48 m (4' 10.27\")   Wt 46.5 kg (102 lb 8.2 oz)   SpO2 96%   BMI 21.23 kg/m²  Body surface area is 1.38 meters squared.     Labs 8/26/22  ANC~ 09366 Plt = 153k   Hgb = 13.6     SCr = 0.76 mg/dL CrCl ~ 52.8 mL/min   LFT's = 72/44/521 TBili = 0.7      Drug Order   (Drug name, dose, route, IV Fluid & volume, frequency, number of doses) Cycle: 4  Previous treatment: C3 on 8/12/22     Medication = Oxaliplatin  Base Dose= 65 mg/m2  Calc Dose: Base Dose x 1.38 m2 = 89.7 mg  Final Dose = 89.5 mg  Route = IV  Fluid & Volume = D5W 250 mL  Admin Duration = Over 2 hours          <10% difference, okay to treat with final dose   Medication = Leucovorin  Base Dose= 400 mg/m2  Calc Dose: Base Dose x 1.38 m2 = 552 mg  Final Dose = 552 mg  Route = IV  Fluid & Volume = D5W 250 mL  Admin Duration = Over 90 minutes   Run concurrently with irinotecan       <10% difference, okay to treat with final dose   Medication = Irinotecan  Base Dose= 150 mg/m2  Calc Dose:Base Dose x 1.38 m2 = 207 mg  Final Dose = 207 mg  Route = IV  Fluid & Volume = D5W 500 mL  Admin Duration = Over 90 minutes   Run concurrently with leucovorin       <10% " difference, okay to treat with final dose   Medication = Fluorouracil  Base Dose= 2400 mg/m2  Calc Dose: Base Dose x 1.38 m2 = 3312 mg  Final Dose = 3310 mg  Route = IV  Conc. = 50 mg/mL  Fluid & Volume = 66.2 mL (+3 mL overfill = 69.2 mL)  Admin Duration = Over 46 hours at 1.4 mL/hour          <10% difference, okay to treat with final dose   By my signature below, I confirm this process was performed independently with the BSA and all final chemotherapy dosing calculations congruent. I have reviewed the above chemotherapy order and that my calculation of the final dose and BSA (when applicable) corroborate those calculations of the  pharmacist. Discrepancies of 10% or greater in the written dose have been addressed and documented within the EPIC Progress notes.    Brody Castellano, PharmD

## 2022-08-26 NOTE — PROGRESS NOTES
Follow Up Note:  Hematology/Oncology      Primary Care:  Misael Heredia M.D.    Diagnosis: Pancreatic adenocarcinoma    Chief Complaint: On-treatment visit    Current Treatment: FOLFIRINOX    Prior Treatment: NA    Oncology History of Presenting Illness:  Ella Kent is a 66 y.o.  woman who presents to the clinic for evaluation for systemic therapy for a diagnosis of metastatic pancreatic adenocarcinoma, arising from the body and tail of the pancreas. She had noticed pain in her lower back that radiated to the abdomen, along with weight loss and and nausea. She eventually was sent for an US and subsequent Ct scan which showed a pancreatic mass. She then had an ERCP and biopsy in May 2022 which confirmed the diagnosis. She was initially evaluated for surgical management but a follow up CT at the end of June revealed metastatic disease in the liver. She was subsequently referred to medical oncology for evaluation.     Treatment History:   07/15/22: C1D1 FOLFIRINOX  07/29/22: C2D1 FOLFIRINOX  08/12/22: C3D1 FOLFIRINOX  08/26/22: C4D1 FOLFIRINOX (oxaliplatin reduced to 65 mg/m2 due to neuropathy)    Interval History:  Patient is here for follow up visit. She had some more numbness and tingling in her hands and feet after the chemo, which lasted into the second week and was somewhat debilitating. She also had some more fatigue and nausea. She felt the nausea was triggered by certain foods and textures though, and she was able to control that with her antiemetics. She continues to take only celecoxib for pain and is doing well.     Allergies as of 08/26/2022 - Reviewed 08/26/2022   Allergen Reaction Noted    Codeine Nausea 05/19/2022    Hydrocodone Nausea 05/19/2022         Current Outpatient Medications:     celecoxib (CELEBREX) 200 MG Cap, Take 1 Capsule by mouth 2 times a day., Disp: 60 Capsule, Rfl: 1    ondansetron (ZOFRAN) 4 MG Tab tablet, Take 1 Tablet by mouth every four hours as needed for  Nausea/Vomiting., Disp: 30 Tablet, Rfl: 6    prochlorperazine (COMPAZINE) 10 MG Tab, Take 1 Tablet by mouth every 6 hours as needed for Nausea/Vomiting., Disp: 30 Tablet, Rfl: 6    loperamide (IMODIUM) 2 MG Cap, Take 1 Capsule by mouth as needed for Diarrhea., Disp: 30 Capsule, Rfl: 6    lidocaine-prilocaine (EMLA) 2.5-2.5 % Cream, Apply to port one hour prior to access and cover with plastic wrap, Disp: 30 g, Rfl: 6    promethazine (PHENERGAN) 25 MG Tab, Take 0.5 Tablets by mouth every 6 hours as needed for Nausea/Vomiting., Disp: 30 Tablet, Rfl: 0    acetaminophen (TYLENOL) 500 MG Tab, Take 500-1,000 mg by mouth every 6 hours as needed., Disp: , Rfl:     VITAMIN D, ERGOCALCIFEROL, PO, Take 1 Tablet by mouth every day., Disp: , Rfl:     Multiple Vitamins-Minerals (MULTIVITAMIN WOMEN PO), Take 1 Tablet by mouth every day., Disp: , Rfl:     Multiple Vitamins-Minerals (EMERGEN-C IMMUNE PLUS PO), Take 1 Dose by mouth every day., Disp: , Rfl:     ibuprofen (MOTRIN) 200 MG Tab, Take 200 mg by mouth every 6 hours as needed. (Patient not taking: No sig reported), Disp: , Rfl:     CALCIUM PO, Take 1 Tablet by mouth every day. (Patient not taking: No sig reported), Disp: , Rfl:       Review of Systems:  Review of Systems   Constitutional:  Positive for malaise/fatigue. Negative for chills, diaphoresis, fever and weight loss.   HENT:  Negative for hearing loss, nosebleeds, sinus pain and sore throat.    Eyes:  Negative for blurred vision and double vision.   Respiratory:  Negative for cough, sputum production, shortness of breath and wheezing.    Cardiovascular:  Negative for chest pain, palpitations, orthopnea and leg swelling.   Gastrointestinal:  Positive for abdominal pain and nausea. Negative for blood in stool, constipation, diarrhea, heartburn, melena and vomiting.   Genitourinary:  Negative for dysuria, frequency, hematuria and urgency.   Musculoskeletal:  Positive for back pain. Negative for joint pain and myalgias.  "  Skin:  Negative for rash.   Neurological:  Positive for tingling (With numbness occasionally in hands and feet). Negative for dizziness, weakness and headaches.   Endo/Heme/Allergies:  Does not bruise/bleed easily.   Psychiatric/Behavioral:  The patient is not nervous/anxious and does not have insomnia.        Physical Exam:  Vitals:    08/26/22 0902   BP: 108/62   BP Location: Left arm   Patient Position: Sitting   BP Cuff Size: Small adult   Pulse: (!) 104   Resp: 16   Temp: 36.4 °C (97.6 °F)   TempSrc: Temporal   SpO2: 92%   Weight: 46.5 kg (102 lb 9.6 oz)   Height: 1.49 m (4' 10.66\")       DESC; KARNOFSKY SCALE WITH ECOG EQUIVALENT: 100, Fully active, able to carry on all pre-disease performed without restriction (ECOG equivalent 0)    DISTRESS LEVEL: no apparent distress    Physical Exam  Vitals and nursing note reviewed.   Constitutional:       General: She is awake. She is not in acute distress.     Appearance: Normal appearance. She is normal weight. She is not ill-appearing, toxic-appearing or diaphoretic.   HENT:      Head: Normocephalic and atraumatic.      Nose: Nose normal. No congestion.      Mouth/Throat:      Pharynx: Oropharynx is clear. No oropharyngeal exudate or posterior oropharyngeal erythema.   Eyes:      General: No scleral icterus.     Extraocular Movements: Extraocular movements intact.      Conjunctiva/sclera: Conjunctivae normal.      Pupils: Pupils are equal, round, and reactive to light.   Cardiovascular:      Rate and Rhythm: Normal rate and regular rhythm.      Pulses: Normal pulses.      Heart sounds: Normal heart sounds. No murmur heard.    No friction rub. No gallop.   Pulmonary:      Effort: Pulmonary effort is normal.      Breath sounds: Normal breath sounds. No decreased air movement. No wheezing, rhonchi or rales.   Abdominal:      General: Bowel sounds are normal. There is no distension.      Tenderness: There is abdominal tenderness in the epigastric area. "   Musculoskeletal:         General: No deformity. Normal range of motion.      Cervical back: Normal range of motion and neck supple. No tenderness.      Right lower leg: No edema.      Left lower leg: No edema.   Lymphadenopathy:      Cervical: No cervical adenopathy.      Upper Body:      Right upper body: No axillary adenopathy.      Left upper body: No axillary adenopathy.      Lower Body: No right inguinal adenopathy. No left inguinal adenopathy.   Skin:     General: Skin is warm and dry.      Coloration: Skin is not jaundiced.      Findings: No erythema or rash.   Neurological:      General: No focal deficit present.      Mental Status: She is alert and oriented to person, place, and time.      Sensory: Sensation is intact.      Motor: Motor function is intact. No weakness.      Gait: Gait is intact.   Psychiatric:         Attention and Perception: Attention normal.         Mood and Affect: Mood normal.         Behavior: Behavior normal. Behavior is cooperative.         Thought Content: Thought content normal.         Judgment: Judgment normal.         Labs:  No visits with results within 7 Day(s) from this visit.   Latest known visit with results is:   [facility] on 08/12/2022   Component Date Value Ref Range Status    Carcinoembryonic Antigen 08/12/2022 3821.0 (A) 0.0 - 3.0 ng/mL Final    Comment: Results obtained by dilution.  Performed using Roche svetlana e immunoassay analyzer. CEA values determined on  patient samples by different testing procedures cannot be directly compared  with one another and could be the cause of erroneous medical  interpretations. If there is a change in the CEA assay procedure used while  monitoring therapy, then new baselines may need to be established when  comparing previous results.      WBC 08/12/2022 33.7 (A) 4.8 - 10.8 K/uL Final    RBC 08/12/2022 4.32  4.20 - 5.40 M/uL Final    Hemoglobin 08/12/2022 14.6  12.0 - 16.0 g/dL Final    Hematocrit 08/12/2022  42.8  37.0 - 47.0 % Final    MCV 08/12/2022 99.1 (A) 81.4 - 97.8 fL Final    MCH 08/12/2022 33.8 (A) 27.0 - 33.0 pg Final    MCHC 08/12/2022 34.1  33.6 - 35.0 g/dL Final    RDW 08/12/2022 57.4 (A) 35.9 - 50.0 fL Final    Platelet Count 08/12/2022 276  164 - 446 K/uL Final    MPV 08/12/2022 10.1  9.0 - 12.9 fL Final    Neutrophils-Polys 08/12/2022 79.10 (A) 44.00 - 72.00 % Final    Lymphocytes 08/12/2022 6.70 (A) 22.00 - 41.00 % Final    Monocytes 08/12/2022 6.90  0.00 - 13.40 % Final    Eosinophils 08/12/2022 0.80  0.00 - 6.90 % Final    Basophils 08/12/2022 0.20  0.00 - 1.80 % Final    Immature Granulocytes 08/12/2022 6.30 (A) 0.00 - 0.90 % Final    Nucleated RBC 08/12/2022 0.50  /100 WBC Final    Neutrophils (Absolute) 08/12/2022 26.70 (A) 2.00 - 7.15 K/uL Final    Includes immature neutrophils, if present.    Lymphs (Absolute) 08/12/2022 2.25  1.00 - 4.80 K/uL Final    Monos (Absolute) 08/12/2022 2.31 (A) 0.00 - 0.85 K/uL Final    Eos (Absolute) 08/12/2022 0.27  0.00 - 0.51 K/uL Final    Baso (Absolute) 08/12/2022 0.07  0.00 - 0.12 K/uL Final    Immature Granulocytes (abs) 08/12/2022 2.12 (A) 0.00 - 0.11 K/uL Final    NRBC (Absolute) 08/12/2022 0.17  K/uL Final    Outpt Infus CBC Comment 08/12/2022 see below   Final    Comment: Per physician request, the automated differential results reported on this  patient have not been verified by a manual method.      Sodium 08/12/2022 137  135 - 145 mmol/L Final    Potassium 08/12/2022 3.8  3.6 - 5.5 mmol/L Final    Chloride 08/12/2022 96  96 - 112 mmol/L Final    Co2 08/12/2022 25  20 - 33 mmol/L Final    Anion Gap 08/12/2022 16.0  7.0 - 16.0 Final    Glucose 08/12/2022 136 (A) 65 - 99 mg/dL Final    Bun 08/12/2022 22  8 - 22 mg/dL Final    Creatinine 08/12/2022 0.64  0.50 - 1.40 mg/dL Final    Calcium 08/12/2022 9.7  8.5 - 10.5 mg/dL Final    AST(SGOT) 08/12/2022 81 (A) 12 - 45 U/L Final    ALT(SGPT) 08/12/2022 54 (A) 2 - 50 U/L Final    Alkaline Phosphatase 08/12/2022  475 (A) 30 - 99 U/L Final    Total Bilirubin 08/12/2022 0.7  0.1 - 1.5 mg/dL Final    Albumin 08/12/2022 3.9  3.2 - 4.9 g/dL Final    Total Protein 08/12/2022 7.1  6.0 - 8.2 g/dL Final    Globulin 08/12/2022 3.2  1.9 - 3.5 g/dL Final    A-G Ratio 08/12/2022 1.2  g/dL Final    Ca 19-9 08/12/2022 312.0 (A) 0.0 - 35.0 U/mL Final    Comment: Performed using Roche svetlana e immunoassay analyzer.  CA 19 9 values  determined on patient samples by different testing procedures cannot be  directly compared with one another and could be the cause of erroneous  medical interpretations. If there is a change in the CA 19 9 assay procedure  used while monitoring therapy, then new baselines may need to be established  when comparing previous results.      GFR (CKD-EPI) 08/12/2022 97  >60 mL/min/1.73 m 2 Final    Comment: Effective 3/15/2022, estimated Glomerular Filtration Rate  is calculated using race neutral CKD-EPI 2021 equation  per NKF-ASN recommendations.           Imaging:     All listed images below have been independently reviewed by me. I agree with the findings as summarized below:    No results found.    Pathology:  FINAL DIAGNOSIS:     A. Gastric antrum biopsy:          Fragments of benign gastric mucosa demonstrate scattered mild           chronic inflammation and reactive changes.          Warthin-Starry stain negative for Helicobacter-like organisms.          No intestinal metaplasia or malignancy identified.   B. Biopsy slide - pancreas body mass:          Positive for malignant cells, most consistent with           adenocarcinoma.   C. Biopsy core - pancreas body mass:          Biopsy cores demonstrate invasive moderately differentiated           adenocarcinoma.     Comment: Pertinent slides from specimen B and in specimen C were also   reviewed by another pathologist, Dr. Martinez, who agreed with the above   interpretation. There is a sufficient amount of tissue for some   ancillary studies in paraffin block C1.                                          Diagnosis performed by:                                       TIFFANIE COOK MD    Assessment & Plan:  1. History of known metastasis to liver  celecoxib (CELEBREX) 200 MG Cap      2. Malignant neoplasm determined by biopsy of pancreas (HCC)  celecoxib (CELEBREX) 200 MG Cap      3. Overlapping malignant neoplasm of pancreas (HCC)  celecoxib (CELEBREX) 200 MG Cap      4. Pain of upper abdomen  celecoxib (CELEBREX) 200 MG Cap      5. Abdominal ultrasound, abnormal  celecoxib (CELEBREX) 200 MG Cap      6. Pancreatic carcinoma (HCC)  celecoxib (CELEBREX) 200 MG Cap          This is a 66 year old  woman with metastatic pancreatic adenocarcinoma. She has started treatment with FOLFIRINOX.     Current Diagnosis and Staging: Pancreatic adenocarcinoma, cTxNxM1 stage IV disease    Update: Patient is having some issues with neuropathy, so I have reduced oxaliplatin by 20%. We will otherwise keep things as is for now. If her WBC count is very high again today, then we may omit pegfilgrastrim for this cycle and monitor her response. I have refilled celecoxib for her.     Treatment Plan: FOLFIRINOX    Treatment Citation: ETHEL trial, [facility] 2011    Plan of Care:    Primary Therapy: FOLFIRINOX C4D1 (oxaliplatin reduced to 65 mg/m2) today  Supportive Therapy: Antiemetics per protocol   Labs: CBC with diff, CMP, CA 19-9, CEA with each cycle  Imaging: Repeat CT scans after C6  Treatment Planning: Patient to continue with FOLFIRINOX. Referral placed for pain management as well. NGS testing pending. Genetics testing offered but patient deferred at this time, and wants to wait to make a decision.   Consultations: Surgical oncology (Dr. Rivers), Pain management (Dr. Eduardo)  Code Status: Full  Miscellaneous: Await NGS testing  Return for Follow Up: 2 weeks for C5 of therapy    Any questions and concerns raised by the patient were answered to the best of my ability. Thank you for allowing  me to participate in the care for this patient. Please feel free to contact me for any questions or concerns.       Total time spent on chart review, clinic encounter, and documentation: 23 minutes.

## 2022-08-26 NOTE — PROGRESS NOTES
"Pharmacy Chemotherapy Calculations Note:    Patient Name: Ella Kent        Dx: Metastatic Pancreatic Adenocarcinoma w/liver mets  Cycle:  4 Previous treatment: C3= 8/12/22    Protocol: mFOLFIRINOX  OXALIplatin 85 mg/m2 IV over 2 hours on Day 1  Dose reduced to 65mg/m2 C4 on 8/26/22 for tolerance   Leucovorin 400 mg/m2 IV over 2 hours on Day 1  Irinotecan 150 mg/m2 IV over 90 minutes on Day 1  Fluorouracil 1200 mg/m2 IV continuous infusion daily on Days 1-2 (2400 mg/m2 IV over 46 hours)   14 day cycle for 4-12 cycles or until disease progression or unacceptable toxicity    NCCN Guidelines for Pancreatic Adenocarcinoma. V.1.2022.  Sunny T, et al. N Engl J Med. 2011;364(129):1817-25.  Hill SM, et al. Br J Cancer.2016;114(7):737-43.  MG Eva, et al. Rin Surg Oncol. 2013;20(8):2787-95.         BP (!) 129/94   Pulse (!) 18   Temp 36.1 °C (97 °F) (Temporal)   Resp 18   Ht 1.48 m (4' 10.27\")   Wt 46.5 kg (102 lb 8.2 oz)   SpO2 96%   BMI 21.23 kg/m²  Body surface area is 1.38 meters squared.    Labs 8/26/22:  ANC~ 49482 Plt = 153k   Hgb = 13.6     SCr = 0.76mg/dL CrCl ~ 52.79mL/min   AST/ALT/ALP= 72/44/521 TBili = 0.7     Provider aware      OXALIplatin 65 mg/m² x 1.38 m² = 89.7mg   <10% difference, ok to treat with final written dose = 89.5mg    Leucovorin 400 mg/m² x 1.38m² =552 mg  <10% difference, ok to treat with final written dose = 552 mg    Fluorouracil 2400 mg/m² x 1.38 m² = 3312 mg  <10% difference, ok to treat with final written dose = 3310 mg CIVI via CADD over 46 hours    Irinotecan 150 mg/m² x 1.38 m² = 207mg  <10% difference, ok to treat with final written dose = 207  mg    Sheri Duran, PharmD, BCPS             "

## 2022-08-26 NOTE — PROGRESS NOTES
Nutrition Services: Brief Update  Wt Readings from Last 7 Encounters:   08/26/22 46.5 kg (102 lb 8.2 oz)   08/26/22 46.5 kg (102 lb 9.6 oz)   08/12/22 49.3 kg (108 lb 9.6 oz)   08/12/22 49.2 kg (108 lb 7.5 oz)   07/29/22 51.3 kg (113 lb 3.2 oz)   07/29/22 51.3 kg (113 lb 1.5 oz)   07/17/22 53.5 kg (117 lb 15.1 oz)     Weight Change: down 5.5% in the last 2 weeks - severe weight loss     Visited with patient and he  at [facility] during infusion appointment today.  Ella reports that following her last infusion she experienced worsening neuropathy and was sick with a low appetite for one week.  She reports that she rebounds week two after treatment.  She has not been drinking Ensure for the last week related to the thickness of the product and her struggling to drink it and vomiting it up.  Discussed diluting the products with whole milk. She was agreeable to this. Also encouraged including high fat add-ins such as butter, oil, cheese and sour cream to products to optimize the density of her foods.  Patient and  agreeable. Patient denies any constipation or diarrhea.  She takes her antiemetic medications PRN and reports that 1/2 of a tablet usually works well.         Plan/Recommend:  Dilute Ensure with whole milk and aim to build up to 2 cartons per day.  Add in high-fat additions to foods and meals listed above.   Contact RD should questions or concerns arise.     Pt verbalizes understanding and is receptive to information provided. RD to continue to monitor throughout treatment and provide nutrition recommendations as indicated.  Please contact [phone]

## 2022-08-28 NOTE — PROGRESS NOTES
Pt arrived ambulatory to [facility] for 5FU CADD pump disconnect after 46 hours of continuous infusion. Pump stopped with 0 ml remaining in reservoir, 66.2 ml delivered.  Pump disconnected and cleaned,  and returned to pharmacy.  PORT flushed per protocol with brisk blood return observed, heparin instilled, de-accessed, gauze and tape to site.  Pt discharged to self care in [facility], next appointment confirmed.

## 2022-08-29 NOTE — ADDENDUM NOTE
Encounter addended by: Jennifer Edwards, Med Ass't on: 8/29/2022 4:20 PM   Actions taken: Charge Capture section accepted

## 2022-09-09 NOTE — PROGRESS NOTES
"Pharmacy Chemotherapy Calculations Note:    Patient Name: Ella Kent        Dx: Metastatic Pancreatic Adenocarcinoma w/liver mets  Cycle:  5 Previous treatment: C4 = 8/26/22    Protocol: mFOLFIRINOX  OXALIplatin 85 mg/m2 IV over 2 hours on Day 1  Dose reduced to 65mg/m2 C4 on 8/26/22 for tolerance   Leucovorin 400 mg/m2 IV over 2 hours on Day 1  Irinotecan 150 mg/m2 IV over 90 minutes on Day 1  Fluorouracil 1200 mg/m2 IV continuous infusion daily on Days 1-2 (2400 mg/m2 IV over 46 hours)   14 day cycle for 4-12 cycles or until disease progression or unacceptable toxicity  NCCN Guidelines for Pancreatic Adenocarcinoma. V.1.2022.  Sunny T, et al. N Engl J Med. 2011;364(129):1817-25.  Hill SM, et al. Br J Cancer.2016;114(7):737-43.  Eva MG, et al. Rin Surg Oncol. 2013;20(8):2787-95.         Allergies: Codeine and Hydrocodone  /84   Pulse (!) 104   Temp 36.1 °C (96.9 °F) (Temporal)   Resp 16   Ht 1.48 m (4' 10.27\")   Wt 44.5 kg (98 lb 1.7 oz)   SpO2 96%   BMI 20.32 kg/m²  Body surface area is 1.35 meters squared.    Labs 9/8/22  ANC~ 890 Plt = 168k   Hgb = 13.8     SCr = 0.54 mg/dL CrCl ~ 54.8 mL/min (minimum SCr of 0.7)   LFT's = 47/30/306 TBili = 0.8   L.Thien MURRY/Dr. Leggett aware, no treatment today. Defer 1 week       Brody Castellano, PharmD    "

## 2022-09-09 NOTE — PROGRESS NOTES
"Nutrition Services: Brief Update  Wt Readings from Last 7 Encounters:   09/09/22 44.5 kg (98 lb 1.7 oz)   09/09/22 44.6 kg (98 lb 6.4 oz)   08/26/22 46.5 kg (102 lb 9.6 oz)   08/26/22 46.5 kg (102 lb 8.2 oz)   08/12/22 49.3 kg (108 lb 9.6 oz)   08/12/22 49.2 kg (108 lb 7.5 oz)   07/29/22 51.3 kg (113 lb 3.2 oz)     Weight Change: Based on weight history in chart, pt appears to have a weight loss of 2 kg in 2 weeks. This results in a weight loss of 4.3% in 2 weeks.    Pertinent Recent Lab work (DATE 9/9): sodium 129, potassium 2.5, glucose 150, AST 47, alk phos 306, albumin 3.0    RD able to visit pt at [facility]. Per pt, she has only had \"3 good days of eating\" since her last chemo treatment 2 weeks ago. Pt reports she has no appetite and throat irritation. She states she will leave crackers and chips in her mouth until they are soft enough to swallow. Per pt, yesterday she had Activia an yogurt drink, Cheerios with whole milk, and 1/2 of a Stouffers frozen spaghetti meal. Per pt, she has been drinking water with cranberry juice, V8 juice, plain green tea, and sometimes Ensure Complete but no Ensure recently. She states she likes to add milk to the Ensure. Per pt, she has liked eating Malto-meal recently. Per pt, she feels she is not drinking enough fluids and working on that. She reports she is keeping a written journal and records her meals and physical activity.     Plan/Recommend:  Discussed setting meal times and to include small frequent meals daily d/t loss of appetite  Recommended to incorporate Ensure daily and to dilute with whole milk as preferred  Discussed including 1-2 glasses of whole milk daily    Pt verbalizes understanding and is receptive to information provided. RD to continue to monitor throughout treatment and provide nutrition recommendations as indicated.  Please contact [phone]    "

## 2022-09-09 NOTE — PROGRESS NOTES
Pt presented to [facility] for Day 1 Cycle 5 of Folfirinox w/ CADD pump connection. R U chest port accessed using sterile technique; no blood return. Flushed w/ 4 or 5 10mL syringes, had patient turn head, raise arm, lay back, blood return was sluggish and positional, not enough for labs. Alteplase order obtained from LOVELY Jenkins. Labs drawn w/ butterfly from [facility]; not within treatable parameters today d/t low ANC, Dr. Leggett wishes to defer treatment by one week. A second dose of alteplase was needed and infused w/ brisk blood return after about an hour. Critical Potassium of 2.5, Dr. Leggett aware, replaced w/ 40 mEq of KCl through port. Report given to Arleen VAUGHN for discharge. Pt aware of treatment being deferred to next week Friday, schedulers emailed about making that appt for pt

## 2022-09-10 NOTE — PROGRESS NOTES
Follow Up Note:  Hematology/Oncology      Primary Care:  Misael Heredia M.D.    Diagnosis: Pancreatic adenocarcinoma    Chief Complaint: On-treatment visit    Current Treatment: FOLFIRINOX    Prior Treatment: NA    Oncology History of Presenting Illness:  Ella eKnt is a 66 y.o.  woman who presents to the clinic for evaluation for systemic therapy for a diagnosis of metastatic pancreatic adenocarcinoma, arising from the body and tail of the pancreas. She had noticed pain in her lower back that radiated to the abdomen, along with weight loss and and nausea. She eventually was sent for an US and subsequent Ct scan which showed a pancreatic mass. She then had an ERCP and biopsy in May 2022 which confirmed the diagnosis. She was initially evaluated for surgical management but a follow up CT at the end of June revealed metastatic disease in the liver. She was subsequently referred to medical oncology for evaluation.     Treatment History:   07/15/22: C1D1 FOLFIRINOX  07/29/22: C2D1 FOLFIRINOX  08/12/22: C3D1 FOLFIRINOX  08/26/22: C4D1 FOLFIRINOX (oxaliplatin reduced to 65 mg/m2 due to neuropathy)  09/09/22: C5D1 FOLFIRINOX (oxaliplatin reduced to 65 mg/m2 due to neuropathy) delayed (neutropenia, electrolyte abnormalities)    Interval History:  Patient is here for follow up visit. She had some more fatigue with this last round of chemotherapy. The first five days were okay but then she felt more nausea and fatigue. She wants to return to work after C6 of chemotherapy, and was under the impression that chemotherapy would be done after that cycle and the scans. She is very disappointed that is not the case.     Allergies as of 09/09/2022 - Reviewed 09/09/2022   Allergen Reaction Noted    Codeine Nausea 05/19/2022    Hydrocodone Nausea 05/19/2022         Current Outpatient Medications:     ondansetron (ZOFRAN) 4 MG Tab tablet, Take 1 Tablet by mouth every four hours as needed for Nausea/Vomiting., Disp: 30  Tablet, Rfl: 6    prochlorperazine (COMPAZINE) 10 MG Tab, Take 1 Tablet by mouth every 6 hours as needed for Nausea/Vomiting., Disp: 30 Tablet, Rfl: 6    loperamide (IMODIUM) 2 MG Cap, Take 1 Capsule by mouth as needed for Diarrhea., Disp: 30 Capsule, Rfl: 6    lidocaine-prilocaine (EMLA) 2.5-2.5 % Cream, Apply to port one hour prior to access and cover with plastic wrap, Disp: 30 g, Rfl: 6    promethazine (PHENERGAN) 25 MG Tab, Take 0.5 Tablets by mouth every 6 hours as needed for Nausea/Vomiting., Disp: 30 Tablet, Rfl: 0    celecoxib (CELEBREX) 200 MG Cap, Take 1 Capsule by mouth 2 times a day. (Patient not taking: Reported on 9/9/2022), Disp: 60 Capsule, Rfl: 1    acetaminophen (TYLENOL) 500 MG Tab, Take 500-1,000 mg by mouth every 6 hours as needed. (Patient not taking: Reported on 9/9/2022), Disp: , Rfl:     VITAMIN D, ERGOCALCIFEROL, PO, Take 1 Tablet by mouth every day. (Patient not taking: No sig reported), Disp: , Rfl:     CALCIUM PO, Take 1 Tablet by mouth every day. (Patient not taking: Reported on 9/9/2022), Disp: , Rfl:     Multiple Vitamins-Minerals (MULTIVITAMIN WOMEN PO), Take 1 Tablet by mouth every day. (Patient not taking: Reported on 9/9/2022), Disp: , Rfl:     Multiple Vitamins-Minerals (EMERGEN-C IMMUNE PLUS PO), Take 1 Dose by mouth every day. (Patient not taking: Reported on 9/9/2022), Disp: , Rfl:       Review of Systems:  Review of Systems   Constitutional:  Positive for malaise/fatigue. Negative for chills, diaphoresis, fever and weight loss.   HENT:  Negative for hearing loss, nosebleeds, sinus pain and sore throat.    Eyes:  Negative for blurred vision and double vision.   Respiratory:  Negative for cough, sputum production, shortness of breath and wheezing.    Cardiovascular:  Negative for chest pain, palpitations, orthopnea and leg swelling.   Gastrointestinal:  Positive for abdominal pain and nausea. Negative for blood in stool, constipation, diarrhea, heartburn, melena and vomiting.  "  Genitourinary:  Negative for dysuria, frequency, hematuria and urgency.   Musculoskeletal:  Positive for back pain. Negative for joint pain and myalgias.   Skin:  Negative for rash.   Neurological:  Positive for tingling (With numbness occasionally in hands and feet). Negative for dizziness, weakness and headaches.   Endo/Heme/Allergies:  Does not bruise/bleed easily.   Psychiatric/Behavioral:  The patient is not nervous/anxious and does not have insomnia.        Physical Exam:  Vitals:    09/09/22 0841   BP: (!) 98/60   BP Location: Right arm   Patient Position: Sitting   BP Cuff Size: Small adult   Pulse: (!) 110   Resp: 14   Temp: 36.1 °C (97 °F)   TempSrc: Temporal   SpO2: 96%   Weight: 44.6 kg (98 lb 6.4 oz)   Height: 1.48 m (4' 10.27\")       DESC; KARNOFSKY SCALE WITH ECOG EQUIVALENT: 100, Fully active, able to carry on all pre-disease performed without restriction (ECOG equivalent 0)    DISTRESS LEVEL: no apparent distress    Physical Exam  Vitals and nursing note reviewed.   Constitutional:       General: She is awake. She is not in acute distress.     Appearance: Normal appearance. She is normal weight. She is not ill-appearing, toxic-appearing or diaphoretic.   HENT:      Head: Normocephalic and atraumatic.      Nose: Nose normal. No congestion.      Mouth/Throat:      Pharynx: Oropharynx is clear. No oropharyngeal exudate or posterior oropharyngeal erythema.   Eyes:      General: No scleral icterus.     Extraocular Movements: Extraocular movements intact.      Conjunctiva/sclera: Conjunctivae normal.      Pupils: Pupils are equal, round, and reactive to light.   Cardiovascular:      Rate and Rhythm: Normal rate and regular rhythm.      Pulses: Normal pulses.      Heart sounds: Normal heart sounds. No murmur heard.    No friction rub. No gallop.   Pulmonary:      Effort: Pulmonary effort is normal.      Breath sounds: Normal breath sounds. No decreased air movement. No wheezing, rhonchi or rales. "   Abdominal:      General: Bowel sounds are normal. There is no distension.      Tenderness: There is abdominal tenderness in the epigastric area.   Musculoskeletal:         General: No deformity. Normal range of motion.      Cervical back: Normal range of motion and neck supple. No tenderness.      Right lower leg: No edema.      Left lower leg: No edema.   Lymphadenopathy:      Cervical: No cervical adenopathy.      Upper Body:      Right upper body: No axillary adenopathy.      Left upper body: No axillary adenopathy.      Lower Body: No right inguinal adenopathy. No left inguinal adenopathy.   Skin:     General: Skin is warm and dry.      Coloration: Skin is not jaundiced.      Findings: No erythema or rash.   Neurological:      General: No focal deficit present.      Mental Status: She is alert and oriented to person, place, and time.      Sensory: Sensation is intact.      Motor: Motor function is intact. No weakness.      Gait: Gait is intact.   Psychiatric:         Attention and Perception: Attention normal.         Mood and Affect: Mood normal.         Behavior: Behavior normal. Behavior is cooperative.         Thought Content: Thought content normal.         Judgment: Judgment normal.         Labs:  [facility] on 09/09/2022   Component Date Value Ref Range Status    WBC 09/09/2022 1.8 (A) 4.8 - 10.8 K/uL Final    Comment: Results confirmed by repeat testing.. This result has been called to RN  [id number] by Chriss Barnes on 09 09 2022 at 1228, and has been read  back.      RBC 09/09/2022 4.00 (A) 4.20 - 5.40 M/uL Final    Hemoglobin 09/09/2022 13.8  12.0 - 16.0 g/dL Final    Hematocrit 09/09/2022 38.4  37.0 - 47.0 % Final    MCV 09/09/2022 96.0  81.4 - 97.8 fL Final    MCH 09/09/2022 34.5 (A) 27.0 - 33.0 pg Final    MCHC 09/09/2022 35.9 (A) 33.6 - 35.0 g/dL Final    RDW 09/09/2022 61.2 (A) 35.9 - 50.0 fL Final    Platelet Count 09/09/2022 168  164 - 446 K/uL Final    MPV 09/09/2022 9.8   9.0 - 12.9 fL Final    Neutrophils-Polys 09/09/2022 48.70  44.00 - 72.00 % Final    Lymphocytes 09/09/2022 30.10  22.00 - 41.00 % Final    Monocytes 09/09/2022 19.70 (A) 0.00 - 13.40 % Final    Eosinophils 09/09/2022 0.50  0.00 - 6.90 % Final    Basophils 09/09/2022 0.50  0.00 - 1.80 % Final    Immature Granulocytes 09/09/2022 0.50  0.00 - 0.90 % Final    Nucleated RBC 09/09/2022 0.00  /100 WBC Final    Neutrophils (Absolute) 09/09/2022 0.89 (A) 2.00 - 7.15 K/uL Final    Includes immature neutrophils, if present.    Lymphs (Absolute) 09/09/2022 0.55 (A) 1.00 - 4.80 K/uL Final    Monos (Absolute) 09/09/2022 0.36  0.00 - 0.85 K/uL Final    Eos (Absolute) 09/09/2022 0.01  0.00 - 0.51 K/uL Final    Baso (Absolute) 09/09/2022 0.01  0.00 - 0.12 K/uL Final    Immature Granulocytes (abs) 09/09/2022 0.01  0.00 - 0.11 K/uL Final    NRBC (Absolute) 09/09/2022 0.00  K/uL Final    Outpt Infus CBC Comment 09/09/2022 see below   Final    Comment: Per physician request, the automated differential results reported on this  patient have not been verified by a manual method.      Sodium 09/09/2022 129 (A) 135 - 145 mmol/L Final    Potassium 09/09/2022 2.5 (A) 3.6 - 5.5 mmol/L Final    Comment: Critical Result. Read Back Performed. Results called to: RN [id number] by:  [id number] on: 2022-09-09 12:51:51      Chloride 09/09/2022 89 (A) 96 - 112 mmol/L Final    Co2 09/09/2022 24  20 - 33 mmol/L Final    Anion Gap 09/09/2022 16.0  7.0 - 16.0 Final    Glucose 09/09/2022 150 (A) 65 - 99 mg/dL Final    Bun 09/09/2022 14  8 - 22 mg/dL Final    Creatinine 09/09/2022 0.54  0.50 - 1.40 mg/dL Final    Calcium 09/09/2022 8.8  8.5 - 10.5 mg/dL Final    AST(SGOT) 09/09/2022 47 (A) 12 - 45 U/L Final    ALT(SGPT) 09/09/2022 30  2 - 50 U/L Final    Alkaline Phosphatase 09/09/2022 306 (A) 30 - 99 U/L Final    Total Bilirubin 09/09/2022 0.8  0.1 - 1.5 mg/dL Final    Albumin 09/09/2022 3.0 (A) 3.2 - 4.9 g/dL Final    Total Protein 09/09/2022 6.1  6.0 - 8.2 g/dL  Final    Globulin 09/09/2022 3.1  1.9 - 3.5 g/dL Final    A-G Ratio 09/09/2022 1.0  g/dL Final    Ca 19-9 09/09/2022 228.0 (A) 0.0 - 35.0 U/mL Final    Comment: Performed using Roche svetlana e immunoassay analyzer.  CA 19 9 values  determined on patient samples by different testing procedures cannot be  directly compared with one another and could be the cause of erroneous  medical interpretations. If there is a change in the CA 19 9 assay procedure  used while monitoring therapy, then new baselines may need to be established  when comparing previous results.      Carcinoembryonic Antigen 09/09/2022 3117.0 (A) 0.0 - 3.0 ng/mL Final    Comment: Results obtained by dilution.  Performed using Roche svetlana e immunoassay analyzer. CEA values determined on  patient samples by different testing procedures cannot be directly compared  with one another and could be the cause of erroneous medical  interpretations. If there is a change in the CEA assay procedure used while  monitoring therapy, then new baselines may need to be established when  comparing previous results.      GFR (CKD-EPI) 09/09/2022 101  >60 mL/min/1.73 m 2 Final    Comment: Estimated Glomerular Filtration Rate is calculated using  race neutral CKD-EPI 2021 equation per NKF-ASN recommendations.         Imaging:     All listed images below have been independently reviewed by me. I agree with the findings as summarized below:    No results found.    Pathology:    NGS testing:      VUS:        FINAL DIAGNOSIS:     A. Gastric antrum biopsy:          Fragments of benign gastric mucosa demonstrate scattered mild           chronic inflammation and reactive changes.          Warthin-Starry stain negative for Helicobacter-like organisms.          No intestinal metaplasia or malignancy identified.   B. Biopsy slide - pancreas body mass:          Positive for malignant cells, most consistent with           adenocarcinoma.   C. Biopsy core - pancreas body mass:           Biopsy cores demonstrate invasive moderately differentiated           adenocarcinoma.     Comment: Pertinent slides from specimen B and in specimen C were also   reviewed by another pathologist, Dr. Martinez, who agreed with the above   interpretation. There is a sufficient amount of tissue for some   ancillary studies in paraffin block C1.                                         Diagnosis performed by:                                       TIFFANIE COOK MD    Assessment & Plan:  1. Pancreatic carcinoma (HCC)  CT-CHEST,ABDOMEN,PELVIS WITH          This is a 66 year old  woman with metastatic pancreatic adenocarcinoma. She has started treatment with FOLFIRINOX.     Current Diagnosis and Staging: Pancreatic adenocarcinoma, cTxNxM1 stage IV disease, KRAS G12D mutated    Update: Spent a long time discussing the scope of the disease in more detail, including that the patient would need to remain on therapy with plan for 12 cycles of therapy at minimum. Also discussed prognosis associated with this disease and emphasized quality of life in goal assessment. Patient's labs precluded her from getting C5 today, so this cycle will be deferred one week.     Treatment Plan: FOLFIRINOX    Treatment Citation: PRODIGE trial, [facility] 2011    Plan of Care:    Primary Therapy: FOLFIRINOX C5D1 (oxaliplatin reduced to 65 mg/m2) deferred 1 week (neutropenia, electrolyte abnormalities).   Supportive Therapy: Antiemetics per protocol   Labs: CBC with diff, CMP, CA 19-9, CEA with each cycle  Imaging: Repeat CT scans after C6 (ordered)  Treatment Planning: Patient to continue with FOLFIRINOX. Referral placed for pain management as well.  Consultations: Surgical oncology (Dr. Rivers), Pain management (Dr. Eduardo)  Code Status: Full  Miscellaneous: NA  Return for Follow Up: 3 weeks for C6 of therapy    Any questions and concerns raised by the patient were answered to the best of my ability. Thank you for allowing me to participate in  the care for this patient. Please feel free to contact me for any questions or concerns.       Total time spent on chart review, clinic encounter, and documentation: 38 minutes.

## 2022-09-13 NOTE — TELEPHONE ENCOUNTER
[facility] paperwork/ release to return to work has been reviewed/completed by Dr. Leggett, faxed to [facility] at [phone]. Forms scanned into media and originals left at  for pt retrieval. Message sent to pt with update on paperwork.

## 2022-09-23 NOTE — PROGRESS NOTES
Subjective     Ella Kent is a 66 y.o. female who presents with Pancreatic Cancer (Pre-chemo)            HPI  Ms. Kent presents for re-evaluation prior to cycle 5 FOLFIRINOX for treatment of stage IV adenocarcinoma of the body/tail of pancreas with multiple liver metastases.  She is accompanied by her  for today's visit.     In Spring 2022 patient noted left lower back pain radiating to abdomen, increased nausea, and unexplained weight loss. She underwent abdominal US and CT scan on 5/13/22 which showed pancreatic mass, liver steatosis. ERCP with biopsies was completed 5/20/22 confirming pathology. She was evaluated by Dr. VAUGHN with lesion possibly being resectable but CT completed 6/29/22 showed multiple hypodense masses in the liver and enlarging pancreatic mass. PET/CT completed 6/29/22 showed numerous foci of increased activity throughout the liver as well as pancreatic body and tail, surgery was aborted and she was advised to follow-up with medical oncology for systemic therapy. She was advised to undergo treatment consisting of FOLFIRINOX and initiated therapy on 7/15/22.     Cycle 5 was delayed due to low ANC. She reports increased fatigue for approx. 4 days following each cycle. Presently she is feeling at her baseline.      Review of Systems   Constitutional:  Negative for chills, fever, malaise/fatigue (down for 4 days following treatment) and weight loss (up 5# since last visit; misses spicy foods).   Respiratory:  Positive for cough (mild and self limiting) and shortness of breath (with activity). Negative for wheezing.    Cardiovascular:  Negative for chest pain, palpitations and leg swelling.   Gastrointestinal:  Negative for constipation (Last BM today), diarrhea, nausea and vomiting.   Genitourinary:  Negative for dysuria.   Musculoskeletal:  Negative for joint pain and myalgias.   Neurological:  Positive for dizziness (w/ quick change in position) and tingling (after infusion).  "Negative for headaches.   Psychiatric/Behavioral:  The patient has insomnia (up to urinate often).      Allergies   Allergen Reactions    Codeine Nausea     Nauseous with most narcotics    Hydrocodone Nausea         Current Outpatient Medications on File Prior to Encounter   Medication Sig Dispense Refill    Amylase-Lipase-Protease (PANCRELIPASE PO) Take  by mouth.      POTASSIUM CHLORIDE ER PO Take  by mouth.      celecoxib (CELEBREX) 200 MG Cap Take 1 Capsule by mouth 2 times a day. 60 Capsule 1    ondansetron (ZOFRAN) 4 MG Tab tablet Take 1 Tablet by mouth every four hours as needed for Nausea/Vomiting. 30 Tablet 6    prochlorperazine (COMPAZINE) 10 MG Tab Take 1 Tablet by mouth every 6 hours as needed for Nausea/Vomiting. 30 Tablet 6    loperamide (IMODIUM) 2 MG Cap Take 1 Capsule by mouth as needed for Diarrhea. 30 Capsule 6    lidocaine-prilocaine (EMLA) 2.5-2.5 % Cream Apply to port one hour prior to access and cover with plastic wrap 30 g 6    promethazine (PHENERGAN) 25 MG Tab Take 0.5 Tablets by mouth every 6 hours as needed for Nausea/Vomiting. 30 Tablet 0    acetaminophen (TYLENOL) 500 MG Tab Take 500-1,000 mg by mouth every 6 hours as needed.      VITAMIN D, ERGOCALCIFEROL, PO Take 1 Tablet by mouth every day. (Patient not taking: No sig reported)      CALCIUM PO Take 1 Tablet by mouth every day. (Patient not taking: No sig reported)      Multiple Vitamins-Minerals (MULTIVITAMIN WOMEN PO) Take 1 Tablet by mouth every day. (Patient not taking: No sig reported)      Multiple Vitamins-Minerals (EMERGEN-C IMMUNE PLUS PO) Take 1 Dose by mouth every day. (Patient not taking: No sig reported)       No current facility-administered medications on file prior to encounter.              Objective     /60 (BP Location: Right arm, Patient Position: Sitting, BP Cuff Size: Adult)   Pulse (!) 101   Temp 36.3 °C (97.4 °F) (Temporal)   Resp 14   Ht 1.48 m (4' 10.27\")   Wt 46.7 kg (103 lb)   SpO2 96%   BMI " 21.33 kg/m²      Physical Exam  Vitals reviewed.   Constitutional:       General: She is not in acute distress.     Appearance: She is well-developed. She is not diaphoretic.   HENT:      Head: Normocephalic and atraumatic.      Mouth/Throat:      Pharynx: No oropharyngeal exudate.   Eyes:      General: No scleral icterus.        Right eye: No discharge.         Left eye: No discharge.      Conjunctiva/sclera: Conjunctivae normal.      Pupils: Pupils are equal, round, and reactive to light.   Cardiovascular:      Rate and Rhythm: Normal rate and regular rhythm.      Heart sounds: Normal heart sounds. No murmur heard.    No friction rub. No gallop.   Pulmonary:      Effort: Pulmonary effort is normal. No respiratory distress.      Breath sounds: Normal breath sounds. No wheezing.   Abdominal:      General: Bowel sounds are normal. There is no distension.      Palpations: Abdomen is soft.      Tenderness: There is abdominal tenderness (mild - midline).   Musculoskeletal:         General: Normal range of motion.      Cervical back: Normal range of motion and neck supple.   Skin:     General: Skin is warm and dry.      Coloration: Skin is not pale.      Findings: No erythema or rash.   Neurological:      Mental Status: She is alert and oriented to person, place, and time.   Psychiatric:         Mood and Affect: Mood normal.         Behavior: Behavior normal.       Labs to be completed prior to dosing         Assessment & Plan     1. Overlapping malignant neoplasm of pancreas (HCC)        2. Encounter for long-term current use of high risk medication            1.  Pancreatic cancer: Diagnosed 5/20/22; initiated FOLFIRINOX 7/15/22     CBC, CMP to be completed prior to dosing and if within acceptable limits she will proceed with cycle 5 of treatment and return in 2 weeks for evaluation prior to cycle 6, sooner as needed.       The patient verbalized agreement and understanding of current plan. All questions and concerns  were addressed at time of visit.    Please note that this dictation was created using voice recognition software. I have made every reasonable attempt to correct obvious errors, but I expect that there are errors of grammar and possibly content that I did not discover before finalizing the note.

## 2022-09-23 NOTE — PROGRESS NOTES
"Pharmacy Chemotherapy Calculations Note:    Patient Name: Ella Kent      Dx: Metastatic Pancreatic Adenocarcinoma w/liver mets    Cycle 5 (Delayed due to neutropenia)   Previous treatment: C4 = 8/26/22    Protocol: mFOLFIRINOX  OXALIplatin 85 mg/m2 IV over 2 hours on Day 1  Dose reduced to 65mg/m2 C4 on 8/26/22 for tolerance   Leucovorin 400 mg/m2 IV over 2 hours on Day 1  Irinotecan 150 mg/m2 IV over 90 minutes on Day 1  Fluorouracil 1200 mg/m2 IV continuous infusion daily on Days 1-2 (2400 mg/m2 IV over 46 hours)   14 day cycle for 4-12 cycles or until disease progression or unacceptable toxicity  NCCN Guidelines for Pancreatic Adenocarcinoma. V.1.2022.  Sunny T, et al. N Engl J Med. 2011;364(258):1817-25.  Sergio SM, et al. Br J Cancer.2016;114(7):737-43.  MG Eva, et al. Rin Surg Oncol. 2013;20(8):2787-95.         Allergies: Codeine and Hydrocodone  /89   Pulse (!) 101   Temp 36 °C (96.8 °F) (Temporal)   Resp 18   Ht 1.49 m (4' 10.66\")   Wt 46.4 kg (102 lb 4.7 oz)   SpO2 97%   BMI 20.90 kg/m²  Body surface area is 1.39 meters squared.    Labs 9/23/22:  ANC~ 25442 Plt = 164k   Hgb = 13     SCr = 0.39 mg/dL CrCl ~ 58 mL/min   AST/ALT/ALK  = 85/47/477 TBili = 1.5     *MD aware of current labs, okay to proceed with treatment today per Dr. Leggett*     OXALIplatin 65 mg/m² x 1.39 m² = 90.35 mg   <10% difference, ok to treat with final dose = 90.5 mg IV    Irinotecan 150 mg/m² x 1.39 m² = 208.5 mg  <10% difference, ok to treat with final dose = 208.6 mg IV  Run concurrently with leucovorin     Leucovorin 400 mg/m² x 1.39 m² = 556 mg  <10% difference, ok to treat with final dose = 556 mg IVP   Run concurrently with Irinotecan     Fluorouracil 2400 mg/m² x 1.39 m² = 3336 mg  <10% difference, ok to treat with final dose = 3335 mg CIVI over 46 hours at 1.5 mL/hour    Jed Wharton, JazminD    "

## 2022-09-23 NOTE — PROGRESS NOTES
Pt arrived ambulatory to [facility] for D1C5 mFOLIRINOX chemotherapy. POC discussed with pt and she agrees with plan. Pt with call light in reach for safety. Pt verbalized understanding to call for needs/assist.     Pt states she is feeling better today than last week. Pt states she is eating and taking in PO fluids without difficultly, denies N/V/D. Port accessed in sterile fashion, brisk blood return noted. Labs drawn as ordered. Results reviewed, T-Bilirubin 1.5 today. Call placed to amy Toribio to treat with T-bilirubin 1.5/TORB. pt's potassium is 3.2 today. Pt declined oral potassium in clinic. Pt states she will take potassium at home because her pills are smaller.     Pt medicated per MAR. Pt tolerated treatment without s/s adverse reaction. Port with brisk blood return. CADD pump connected, clamps open, connections secure, pump in RUN mode. Pt discharged to self care, [facility]. Pt's next appt confirmed 9/25/2022 for pump disconnect.

## 2022-09-23 NOTE — PROGRESS NOTES
"Patient Name: Ella Kent     Protocol: Modified FOLFIRINOX       *Dosing Reference*  Oxaliplatin  IV over 2 hours on Day 1    8/26/22 - Dose reduced to 65 mg/m2 for neuropathy per Dr. Leggett  Leucovorin 400 mg/m2 IV over 2 hours on Day 1  Irinotecan 150 mg/m2 IV over 90  Minutes (begin 30 minutes after start of leucovorin infusion) on Day 1  Fluorouracil 1200 mg/m2 IV continuous infusion daily on Days 1-2 (2400 mg/m2 IV over 46 hours)  14-day cycle until DP or UT (metastatic)  NCCN Guidelines for Pancreatic Adenocarcinoma V.1.2022.  Sunny T, et al. N Engl J Med. 2018;379(25):4749-6843.  Hill SM, et al. Br J Cancer. 2016;114(7):737-43.    Dx: Pancreatic CA stage IV         Allergies:  Codeine and Hydrocodone     /89   Pulse (!) 101   Temp 36 °C (96.8 °F) (Temporal)   Resp 18   Ht 1.49 m (4' 10.66\")   Wt 46.4 kg (102 lb 4.7 oz)   SpO2 97%   BMI 20.90 kg/m²  Body surface area is 1.39 meters squared.     Labs 9/23/22  ANC~ 66157 Plt = 164k   Hgb = 13     SCr = 0.39 mg/dL CrCl ~ 57.1 mL/min (minimum SCr of 0.7)   LFT's = 85/47/477 TBili = 1.5   L.Thien MURRY aware, okay to proceed with treatment today     Drug Order   (Drug name, dose, route, IV Fluid & volume, frequency, number of doses) Cycle: 5 (delayed for low ANC)  Previous treatment: C4 on 8/26/22     Medication = Oxaliplatin  Base Dose= 65 mg/m2  Calc Dose: Base Dose x 1.39 m2 = 90.35 mg  Final Dose = 90.5 mg  Route = IV  Fluid & Volume = D5W 250 mL  Admin Duration = Over 2 hours          <10% difference, okay to treat with final dose   Medication = Leucovorin  Base Dose= 400 mg/m2  Calc Dose: Base Dose x 1.39 m2 = 556 mg  Final Dose = 556 mg  Route = IV  Fluid & Volume = D5W 250 mL  Admin Duration = Over 90 minutes   Run concurrently with irinotecan       <10% difference, okay to treat with final dose   Medication = Irinotecan  Base Dose= 150 mg/m2  Calc Dose:Base Dose x 1.39 m2 = 208.5 mg  Final Dose = 208.6 mg  Route = IV  Fluid & " Volume = D5W 500 mL  Admin Duration = Over 90 minutes   Run concurrently with leucovorin       <10% difference, okay to treat with final dose   Medication = Fluorouracil  Base Dose= 2400 mg/m2  Calc Dose: Base Dose x 1.39 m2 = 3336 mg  Final Dose = 3335 mg  Route = IV  Conc. = 50 mg/mL  Fluid & Volume = 66.7 mL (+3 mL overfill = 69.7 mL)  Admin Duration = Over 46 hours at 1.5 mL/hour          <10% difference, okay to treat with final dose   By my signature below, I confirm this process was performed independently with the BSA and all final chemotherapy dosing calculations congruent. I have reviewed the above chemotherapy order and that my calculation of the final dose and BSA (when applicable) corroborate those calculations of the  pharmacist. Discrepancies of 10% or greater in the written dose have been addressed and documented within the EPIC Progress notes.    Brody Castellano, JazminD

## 2022-09-23 NOTE — PROGRESS NOTES
Chemotherapy Verification - PRIMARY RN      Height = 149cm  Weight = 46.4kg  BSA = 1.39m^2       Medication: Oxaliplatin  Dose: 65mg/m^2  Calculated Dose: 90.35mg                                 Medication: irinotecan  Dose: 150mg/m^2  Calculated Dose: 208.5mg                                Medication: fluorouracil   Dose: 2400mg/m^2  Calculated Dose: 3336mg via CADD pump over 46 hours                                 I confirm this process was performed independently with the BSA and all final chemotherapy dosing calculations congruent.  Any discrepancies of 10% or greater have been addressed with the chemotherapy pharmacist. The resolution of the discrepancy has been documented in the EPIC progress notes.

## 2022-09-23 NOTE — PROGRESS NOTES
Chemotherapy Verification - SECONDARY RN   Cycle 5 Day 1    Height = 149 cm  Weight = 46.4 kg  BSA = 1.39 m^2       Medication: oxaliplatin (ELOXATIN)  Dose: 65 mg/m2  Calculated Dose: 90.35 mg                             (In mg/m2, AUC, mg/kg)     Medication: irinotecan (CAMPTOSAR)  Dose: 150 mg/m2  Calculated Dose: 208.5 mg                             (In mg/m2, AUC, mg/kg)    Medication: leucovorin  Dose: 400 mg/m2  Calculated Dose: 556 mg                             (In mg/m2, AUC, mg/kg)    Medication: fluorouracil (ADRUCIL) in CADD over 46 hours  Dose: 2,400 mg/m2  Calculated Dose: 3,336 mg                             (In mg/m2, AUC, mg/kg)    I confirm that this process was performed independently.

## 2022-09-26 NOTE — PROGRESS NOTES
Pt arrived ambulatory to [facility] for CADD pump disconnect and On-Pro application. POC discussed with pt and she agrees with plan.     CADD pump volume reads zero in reservoir. Pump disconnected, port with brisk blood return, flushed with NS then heparin. Port de-accessed, gauze dressing applied. On-Pro applied to back right arm. Pt discharged to [facility] care, [facility]. Pt's next appt confirmed 10/7/2022.

## 2022-09-28 NOTE — TELEPHONE ENCOUNTER
I called and left a voicemail for the patient to contact the office in regards to scheduling her pre-chemo appointments with our office for her next couple of cycles.  She is scheduled on 10/7/21 at 7:00 am and on 10/21/22 at 7:30 am for infusion.  Stated that I would also send her a my-chart message in regards to this.  Patient asked to call back or respond to my-chart message.

## 2022-09-30 NOTE — TELEPHONE ENCOUNTER
I called and spoke with the patient about getting scheduled for a pre-chemo appointment prior to her next cycle which is scheduled on 10/7/22.  Patient stated that she has a CT scan scheduled on 10/14/22 and had discussed with Dr. Leggett about not doing the infusion treatments until after he sees her CT scan results.  Informed patient that I would cancel her next scheduled cycle. I scheduled her to see Dr. Leggett on 10/19/22 to review those scan results and discuss her continuing treatment.

## 2022-10-05 NOTE — PROGRESS NOTES
C6 deferred until after imaging  
GOALS: Pt will perform all transfers with RW & independence in 4wks

## 2022-10-06 NOTE — TELEPHONE ENCOUNTER
Spoke with patients  and he expressed his concerns about treatment being delayed. I informed him that his wife had canceled but ideally she would have done this cycle, but that Dr. Leggett is aware. Informed him that we will proceed with the CT scans and follow up as planned, which will also act as Prechemo for her next cycle. Patients  expressed that this has been taking a toll on him and wanted to make sure things are perfect. Reassured patients  that we are tracking and after scans we will get back on track. He agreed and verbalized understanding. Patient informed to call back with more questions or concerns.

## 2022-10-20 NOTE — PROGRESS NOTES
Follow Up Note:  Hematology/Oncology      Primary Care:  Misael Heredia M.D.    Diagnosis: Pancreatic adenocarcinoma    Chief Complaint: On-treatment visit    Current Treatment: FOLFIRINOX    Prior Treatment: NA    Oncology History of Presenting Illness:  Ella Kent is a 66 y.o.  woman who presents to the clinic for evaluation for systemic therapy for a diagnosis of metastatic pancreatic adenocarcinoma, arising from the body and tail of the pancreas. She had noticed pain in her lower back that radiated to the abdomen, along with weight loss and and nausea. She eventually was sent for an US and subsequent Ct scan which showed a pancreatic mass. She then had an ERCP and biopsy in May 2022 which confirmed the diagnosis. She was initially evaluated for surgical management but a follow up CT at the end of June revealed metastatic disease in the liver. She was subsequently referred to medical oncology for evaluation.     Treatment History:   07/15/22: C1D1 FOLFIRINOX  07/29/22: C2D1 FOLFIRINOX  08/12/22: C3D1 FOLFIRINOX  08/26/22: C4D1 FOLFIRINOX (oxaliplatin reduced to 65 mg/m2 due to neuropathy)  09/09/22: C5D1 FOLFIRINOX (oxaliplatin reduced to 65 mg/m2 due to neuropathy) delayed (neutropenia, electrolyte abnormalities)  09/23/22: C5D1 FOLFIRINOX (oxaliplatin reduced to 65 mg/m2 due to neuropathy)    Interval History:  Patient is here for follow up visit. She opted not to have C6 of therapy in order to see what her scans showed. She has been having more weakness and fatigue and more abdominal pain as well.     Allergies as of 10/19/2022 - Reviewed 10/19/2022   Allergen Reaction Noted    Codeine Nausea 05/19/2022    Hydrocodone Nausea 05/19/2022         Current Outpatient Medications:     POTASSIUM CHLORIDE ER PO, Take  by mouth., Disp: , Rfl:     ondansetron (ZOFRAN) 4 MG Tab tablet, Take 1 Tablet by mouth every four hours as needed for Nausea/Vomiting., Disp: 30 Tablet, Rfl: 6    prochlorperazine  (COMPAZINE) 10 MG Tab, Take 1 Tablet by mouth every 6 hours as needed for Nausea/Vomiting., Disp: 30 Tablet, Rfl: 6    loperamide (IMODIUM) 2 MG Cap, Take 1 Capsule by mouth as needed for Diarrhea., Disp: 30 Capsule, Rfl: 6    lidocaine-prilocaine (EMLA) 2.5-2.5 % Cream, Apply to port one hour prior to access and cover with plastic wrap, Disp: 30 g, Rfl: 6    promethazine (PHENERGAN) 25 MG Tab, Take 0.5 Tablets by mouth every 6 hours as needed for Nausea/Vomiting., Disp: 30 Tablet, Rfl: 0    acetaminophen (TYLENOL) 500 MG Tab, Take 500-1,000 mg by mouth every 6 hours as needed., Disp: , Rfl:     Amylase-Lipase-Protease (PANCRELIPASE PO), Take  by mouth. (Patient not taking: Reported on 10/19/2022), Disp: , Rfl:     VITAMIN D, ERGOCALCIFEROL, PO, Take 1 Tablet by mouth every day. (Patient not taking: No sig reported), Disp: , Rfl:       Review of Systems:  Review of Systems   Constitutional:  Positive for malaise/fatigue. Negative for chills, diaphoresis, fever and weight loss.   HENT:  Negative for hearing loss, nosebleeds, sinus pain and sore throat.    Eyes:  Negative for blurred vision and double vision.   Respiratory:  Negative for cough, sputum production, shortness of breath and wheezing.    Cardiovascular:  Negative for chest pain, palpitations, orthopnea and leg swelling.   Gastrointestinal:  Positive for abdominal pain and nausea. Negative for blood in stool, constipation, diarrhea, heartburn, melena and vomiting.   Genitourinary:  Negative for dysuria, frequency, hematuria and urgency.   Musculoskeletal:  Positive for back pain. Negative for joint pain and myalgias.   Skin:  Negative for rash.   Neurological:  Positive for tingling (With numbness occasionally in hands and feet). Negative for dizziness, weakness and headaches.   Endo/Heme/Allergies:  Does not bruise/bleed easily.   Psychiatric/Behavioral:  The patient is not nervous/anxious and does not have insomnia.        Physical Exam:  Vitals:     "10/19/22 1724   BP: 110/52   BP Location: Left arm   Patient Position: Sitting   BP Cuff Size: Small adult   Pulse: 100   Resp: 16   Temp: 35.9 °C (96.6 °F)   TempSrc: Temporal   SpO2: 97%   Weight: 47.4 kg (104 lb 9.6 oz)   Height: 1.49 m (4' 10.66\")       DESC; KARNOFSKY SCALE WITH ECOG EQUIVALENT: 100, Fully active, able to carry on all pre-disease performed without restriction (ECOG equivalent 0)    DISTRESS LEVEL: no apparent distress    Physical Exam  Vitals and nursing note reviewed.   Constitutional:       General: She is awake. She is not in acute distress.     Appearance: Normal appearance. She is normal weight. She is not ill-appearing, toxic-appearing or diaphoretic.   HENT:      Head: Normocephalic and atraumatic.      Nose: Nose normal. No congestion.      Mouth/Throat:      Pharynx: Oropharynx is clear. No oropharyngeal exudate or posterior oropharyngeal erythema.   Eyes:      General: Scleral icterus present.      Extraocular Movements: Extraocular movements intact.      Conjunctiva/sclera: Conjunctivae normal.      Pupils: Pupils are equal, round, and reactive to light.   Cardiovascular:      Rate and Rhythm: Normal rate and regular rhythm.      Pulses: Normal pulses.      Heart sounds: Normal heart sounds. No murmur heard.    No friction rub. No gallop.   Pulmonary:      Effort: Pulmonary effort is normal.      Breath sounds: Normal breath sounds. No decreased air movement. No wheezing, rhonchi or rales.   Abdominal:      General: Bowel sounds are normal. There is no distension.      Tenderness: There is abdominal tenderness in the right upper quadrant and epigastric area.   Musculoskeletal:         General: No deformity. Normal range of motion.      Cervical back: Normal range of motion and neck supple. No tenderness.      Right lower leg: No edema.      Left lower leg: No edema.   Lymphadenopathy:      Cervical: No cervical adenopathy.      Upper Body:      Right upper body: No axillary " adenopathy.      Left upper body: No axillary adenopathy.      Lower Body: No right inguinal adenopathy. No left inguinal adenopathy.   Skin:     General: Skin is warm and dry.      Coloration: Skin is not jaundiced.      Findings: No erythema or rash.   Neurological:      General: No focal deficit present.      Mental Status: She is alert and oriented to person, place, and time.      Sensory: Sensation is intact.      Motor: Motor function is intact. No weakness.      Gait: Gait is intact.   Psychiatric:         Attention and Perception: Attention normal.         Mood and Affect: Mood normal.         Behavior: Behavior normal. Behavior is cooperative.         Thought Content: Thought content normal.         Judgment: Judgment normal.         Labs:  No visits with results within 7 Day(s) from this visit.   Latest known visit with results is:   Outpatient Infusion Services on 09/23/2022   Component Date Value Ref Range Status    Carcinoembryonic Antigen 09/23/2022 5576.0 (A)  0.0 - 3.0 ng/mL Final    Comment: Results obtained by dilution.  Performed using Roche svetlana e immunoassay analyzer. CEA values determined on  patient samples by different testing procedures cannot be directly compared  with one another and could be the cause of erroneous medical  interpretations. If there is a change in the CEA assay procedure used while  monitoring therapy, then new baselines may need to be established when  comparing previous results.      WBC 09/23/2022 16.0 (A)  4.8 - 10.8 K/uL Final    RBC 09/23/2022 3.78 (A)  4.20 - 5.40 M/uL Final    Hemoglobin 09/23/2022 13.0  12.0 - 16.0 g/dL Final    Hematocrit 09/23/2022 38.4  37.0 - 47.0 % Final    MCV 09/23/2022 101.6 (A)  81.4 - 97.8 fL Final    MCH 09/23/2022 34.4 (A)  27.0 - 33.0 pg Final    MCHC 09/23/2022 33.9  33.6 - 35.0 g/dL Final    RDW 09/23/2022 69.4 (A)  35.9 - 50.0 fL Final    Platelet Count 09/23/2022 164  164 - 446 K/uL Final    MPV 09/23/2022 10.0  9.0 - 12.9 fL  Final    Neutrophils-Polys 09/23/2022 83.50 (A)  44.00 - 72.00 % Final    Lymphocytes 09/23/2022 8.40 (A)  22.00 - 41.00 % Final    Monocytes 09/23/2022 7.00  0.00 - 13.40 % Final    Eosinophils 09/23/2022 0.00  0.00 - 6.90 % Final    Basophils 09/23/2022 0.50  0.00 - 1.80 % Final    Immature Granulocytes 09/23/2022 0.60  0.00 - 0.90 % Final    Nucleated RBC 09/23/2022 0.00  /100 WBC Final    Neutrophils (Absolute) 09/23/2022 13.35 (A)  2.00 - 7.15 K/uL Final    Includes immature neutrophils, if present.    Lymphs (Absolute) 09/23/2022 1.34  1.00 - 4.80 K/uL Final    Monos (Absolute) 09/23/2022 1.11 (A)  0.00 - 0.85 K/uL Final    Eos (Absolute) 09/23/2022 0.00  0.00 - 0.51 K/uL Final    Baso (Absolute) 09/23/2022 0.08  0.00 - 0.12 K/uL Final    Immature Granulocytes (abs) 09/23/2022 0.09  0.00 - 0.11 K/uL Final    NRBC (Absolute) 09/23/2022 0.00  K/uL Final    Outpt Infus CBC Comment 09/23/2022 see below   Final    Comment: Per physician request, the automated differential results reported on this  patient have not been verified by a manual method.      Sodium 09/23/2022 137  135 - 145 mmol/L Final    Potassium 09/23/2022 3.2 (A)  3.6 - 5.5 mmol/L Final    Chloride 09/23/2022 97  96 - 112 mmol/L Final    Co2 09/23/2022 24  20 - 33 mmol/L Final    Anion Gap 09/23/2022 16.0  7.0 - 16.0 Final    Glucose 09/23/2022 165 (A)  65 - 99 mg/dL Final    Bun 09/23/2022 9  8 - 22 mg/dL Final    Creatinine 09/23/2022 0.39 (A)  0.50 - 1.40 mg/dL Final    Calcium 09/23/2022 8.8  8.5 - 10.5 mg/dL Final    AST(SGOT) 09/23/2022 85 (A)  12 - 45 U/L Final    ALT(SGPT) 09/23/2022 47  2 - 50 U/L Final    Alkaline Phosphatase 09/23/2022 477 (A)  30 - 99 U/L Final    Total Bilirubin 09/23/2022 1.5  0.1 - 1.5 mg/dL Final    Albumin 09/23/2022 2.9 (A)  3.2 - 4.9 g/dL Final    Total Protein 09/23/2022 6.1  6.0 - 8.2 g/dL Final    Globulin 09/23/2022 3.2  1.9 - 3.5 g/dL Final    A-G Ratio 09/23/2022 0.9  g/dL Final    Ca 19-9 09/23/2022 476.0  (A)  0.0 - 35.0 U/mL Final    Comment: Performed using Roche svetlana e immunoassay analyzer.  CA 19 9 values  determined on patient samples by different testing procedures cannot be  directly compared with one another and could be the cause of erroneous  medical interpretations. If there is a change in the CA 19 9 assay procedure  used while monitoring therapy, then new baselines may need to be established  when comparing previous results.      GFR (CKD-EPI) 09/23/2022 109  >60 mL/min/1.73 m 2 Final    Comment: Estimated Glomerular Filtration Rate is calculated using  race neutral CKD-EPI 2021 equation per NKF-ASN recommendations.         Imaging:     All listed images below have been independently reviewed by me. I agree with the findings as summarized below:    CT c/a/p 10/14/22:    IMPRESSION:        1. Pancreatic tail mass is smaller since prior study, now measuring 3.2 cm.  2. Interval progression of hepatic metastatic disease. Innumerable bilobar liver lesions are larger and more confluent since prior.  3. New small to moderate volume ascites.  4. No other metastases in the chest, abdomen or pelvis.    Pathology:    NGS testing:      VUS:        FINAL DIAGNOSIS:     A. Gastric antrum biopsy:          Fragments of benign gastric mucosa demonstrate scattered mild           chronic inflammation and reactive changes.          Warthin-Starry stain negative for Helicobacter-like organisms.          No intestinal metaplasia or malignancy identified.   B. Biopsy slide - pancreas body mass:          Positive for malignant cells, most consistent with           adenocarcinoma.   C. Biopsy core - pancreas body mass:          Biopsy cores demonstrate invasive moderately differentiated           adenocarcinoma.     Comment: Pertinent slides from specimen B and in specimen C were also   reviewed by another pathologist, Dr. Martinez, who agreed with the above   interpretation. There is a sufficient amount of tissue for some    ancillary studies in paraffin block C1.                                         Diagnosis performed by:                                       TIFFANIE COOK MD    Assessment & Plan:  1. Malignant neoplasm determined by biopsy of pancreas (HCC)            This is a now 67 year old  woman with metastatic pancreatic adenocarcinoma. She has been on treatment with FOLFIRINOX but has had progression of disease, and therefore we will switch to gemcitabine and nab-paclitaxel.     Current Diagnosis and Staging: Pancreatic adenocarcinoma, cTxNxM1 stage IV disease, KRAS G12D mutated    Update: The patient has progressive disease, and wants to continue therapy. We had a long discussion about goals of care. She will transition to gemcitabine and nab-paclitaxel, and will start C1 this coming Friday.     Treatment Plan: Gemcitabine/nab-paclitaxel    Treatment Citation: Fish Mckee DD et al. [facility] 2013    Plan of Care:    Primary Therapy: Gemcitabine/nab-paclitaxel C1D1 to start 10/21/22.   Supportive Therapy: Antiemetics per protocol   Labs: CBC with diff, CMP, CA 19-9, CEA with each cycle  Imaging: Repeat CT scans January 2023  Treatment Planning: Patient to switch to gem/abraxane due to progression of disease.   Consultations: Surgical oncology (Dr. Rivers), Pain management (Dr. Eduardo)  Code Status: Full  Miscellaneous: NA  Return for Follow Up: 1 week for toxicity check    Any questions and concerns raised by the patient were answered to the best of my ability. Thank you for allowing me to participate in the care for this patient. Please feel free to contact me for any questions or concerns.       Total time spent on chart review, clinic encounter, and documentation: 27 minutes.

## 2022-10-20 NOTE — TELEPHONE ENCOUNTER
She has questions about her infusion tomorrow.  Dr. Leggett changed the plan and they thought the time would be changing or that that she did not need to be there as long.

## 2022-10-21 PROBLEM — D69.6 THROMBOCYTOPENIA (HCC): Status: ACTIVE | Noted: 2022-01-01

## 2022-10-21 PROBLEM — A41.9 SEPSIS (HCC): Status: ACTIVE | Noted: 2022-01-01

## 2022-10-21 PROBLEM — C80.1 OBSTRUCTIVE JAUNDICE DUE TO CANCER (HCC): Status: ACTIVE | Noted: 2022-01-01

## 2022-10-21 PROBLEM — E87.1 HYPONATREMIA: Status: ACTIVE | Noted: 2022-01-01

## 2022-10-21 PROBLEM — K83.1 OBSTRUCTIVE JAUNDICE DUE TO CANCER (HCC): Status: ACTIVE | Noted: 2022-01-01

## 2022-10-21 PROBLEM — R10.9 AP (ABDOMINAL PAIN): Status: ACTIVE | Noted: 2022-01-01

## 2022-10-21 NOTE — ED PROVIDER NOTES
ED Provider Note    Scribed for Stephen Farah M.D. by Lizeth Cohen. 10/21/2022, 12:53 PM.    Primary care provider: Misael Heredia M.D.  Means of arrival: Walk in   History obtained from: Patient  History limited by: None    CHIEF COMPLAINT  Chief Complaint   Patient presents with    Abnormal Labs     Pt was suppose to get a chemo infusion this am Labs revealed high bilirubin and pt referred to ED for admit and possibly a biliary stent  Hx pancreatic CA with mets to liver       HPI  Ella Kent is a 67 y.o. female with pancreatic cancer with metastasis to the liver who presents to the Emergency Department for abnormal labs. The patient was scheduled to get her chemotherapy infusion this morning, but it got cancelled because her labs revealed high bilirubin levels. She was sent to the [facility] ED for admission and possible biliary stent placement. She has had 5 infusions so far and the last infusion was on 9/23/2022. She has pain in her lower back that began 3 days ago and also has pain where her port is near her abdomen. She has been taking Aleve for her pain. Her oncologist is Dr. Leggett and her GI doctor is Dr. Cruz. She does not experience any fever, chills, nausea, vomiting, or diarrhea.     REVIEW OF SYSTEMS  Pertinent positives include back pain and abdomina pain. Pertinent negatives include no fever, chills, nausea, vomiting, or diarrhea. All other systems negative.    PAST MEDICAL HISTORY   has a past medical history of Cancer (HCC) and Pain.    SURGICAL HISTORY   has a past surgical history that includes kamar by laparoscopy (2018); upper gi endoscopy,diagnosis (N/A, 5/20/2022); egd w/endoscopic ultrasound (N/A, 5/20/2022); egd with asp/bx (N/A, 5/20/2022); and cath placement (Right, 6/30/2022).    SOCIAL HISTORY  Social History     Tobacco Use    Smoking status: Former    Smokeless tobacco: Never    Tobacco comments:     As a teen tried cigs   Vaping Use    Vaping Use: Never used  "  Substance Use Topics    Alcohol use: Not Currently    Drug use: Never      Social History     Substance and Sexual Activity   Drug Use Never       FAMILY HISTORY  History reviewed. No pertinent family history.    CURRENT MEDICATIONS  Scheduled Medications   Medication Dose Frequency    senna-docusate  2 Tablet BID    piperacillin-tazobactam  3.375 g Q8HRS     ALLERGIES  Allergies   Allergen Reactions    Codeine Nausea     Nauseous with most narcotics    Hydrocodone Nausea       PHYSICAL EXAM  VITAL SIGNS: /87   Pulse 88   Temp 36.7 °C (98 °F) (Temporal)   Resp 18   Ht 1.499 m (4' 11\")   Wt 47.9 kg (105 lb 9.6 oz)   SpO2 100%   BMI 21.33 kg/m²     Constitutional: Well developed, Well nourished, Mild distress.   HENT: Normocephalic, Atraumatic, mask in place.  Eyes: Slight scleral icterus, No discharge.   Cardiovascular: Normal heart rate, Normal rhythm, No murmurs, equal pulses.   Pulmonary: Normal breath sounds, No respiratory distress, No wheezing, No rales, No rhonchi.  Abdomen: Tenderness to the right upper quadrant, Soft, No masses, no rebound, no guarding.   Musculoskeletal: No major deformities noted, No tenderness.   Skin: Slight jaundice, Warm, Dry, No erythema, No rash.   Neurologic: Alert & oriented x 3, Normal motor function,  No focal deficits noted.   Psychiatric: Affect normal, Judgment normal, Mood normal.     LABS  Results for orders placed or performed during the hospital encounter of 10/21/22   LIPASE   Result Value Ref Range    Lipase 28 7 - 58 U/L   Lactic Acid -STAT Once   Result Value Ref Range    Lactic Acid 4.3 (HH) 0.5 - 2.0 mmol/L   Prothrombin time (INR)   Result Value Ref Range    PT 17.4 (H) 12.0 - 14.6 sec    INR 1.50 (H) 0.87 - 1.13      All labs reviewed by me.    RADIOLOGY  US-RUQ   Final Result      1. Multiple masses throughout the liver are redemonstrated, in keeping with known hepatic metastasis.   2. Moderate abdominal ascites.   3. Poor flow detected in the main " portal vein, likely artifact. Patent portal vein is seen on same-day CT.      CT-ABDOMEN-PELVIS WITH   Final Result         No significant interval change.      1. Numerous hepatic metastases, similar to prior.   2. Grossly unchanged ill-defined pancreatic tail mass.   3. Moderate abdominopelvic ascites, similar to prior      [id number]    (Results Pending)     The radiologist's interpretation of all radiological studies have been reviewed by me.    COURSE & MEDICAL DECISION MAKING  Pertinent Labs & Imaging studies reviewed. (See chart for details)    12:53 PM - Patient seen and examined at bedside. Patient will be treated with Zosyn 4.5 g. Ordered CT-Abdomen-Pelvis, Lipase, and Blood Culture to evaluate her symptoms. The differential diagnoses include but are not limited to: cholangitis or pancreatic obstruction. Paged Dr. Cruz (GI).     1:45 PM - I discussed the patient's case and the above findings with Dr. Cruz (GI) who would like the patient to be admitted.    1:55 PM - I discussed the patient's case and the above findings with Dr. Downs (Hospitalist) who agrees to evaluate the patient for hospitalization.      2:38 PM - Patient was reevaluated at bedside. Discussed lab and radiology results with the patient and informed her that her CT scan shows numerous hepatic metastases and moderate abdominopelvic ascites, which are similar to prior. I informed her an ultrasound will be needed to further evaluate. Ordered US-RUQ.    Medical Decision Making: Patient presents with worsening what appears to be obstructive process based off her labs and worsening right upper quadrant pain.  I was concerned about possible biliary obstruction therefore CT was done.  This did not show any obvious biliary obstruction therefore an ultrasound was done to better evaluate the patient's common bile duct.  At this point time the patient's common bile duct does not appear to be dilated.  Given her elevated white blood cell  count I am concerned about a sending cholangitis and she was started empiric antibiotics.  Patient did have an elevated lactic acid but given the amount of liver disease I am suspicious this is falsely elevated.  Think the patient will benefit from hospitalization with IV antibiotics and probable MRCP to fully rule out an obstructive process.    DISPOSITION:  Patient will be hospitalized by Dr. Downs in guarded condition.      FINAL IMPRESSION  1. Biliary obstruction    2. Pancreatic adenocarcinoma (HCC)    3. Liver metastases (HCC)       ILizeth (Beverly), am scribing for, and in the presence of, Stephen Farah M.D.    Electronically signed by: Lizeth Cohen (Beverly), 10/21/2022    Stephen LITTLE M.D. personally performed the services described in this documentation, as scribed by Lizeth Cohen in my presence, and it is both accurate and complete. C.     The note accurately reflects work and decisions made by me.  Stephen Farah M.D.  10/21/2022  6:10 PM

## 2022-10-21 NOTE — PROGRESS NOTES
Ella into [facility] for Day [facility]/ Cycle 1 of gemzar and abraxane. Pt denied having any new or acute complaints today. Port accessed in a sterile manner, had + blood return, flushed briskly. Labs drawn and not within parameters. Discussed lab results and jaundice with Josey MURRY. Per Josey, patient to go to ER for stent placement. Maddy then called after and shared that Dr. Leggett recommended patient to go to [facility] for the stent. Port had + blood return after, flushed, and heparin locked. Ella discharged with  to go to [facility] ER.

## 2022-10-21 NOTE — ED TRIAGE NOTES
Upon review of labs from today, pt not neutropenic WBC 19    Pt offered and placed N95 mask on    ER NANY - Nate  aware of pt condition and working on a room

## 2022-10-21 NOTE — ED NOTES
Lizz from Lab called with critical result of Lactic of 4.3 at 1604. Critical lab result read back to 1604.   Dr. Downs notified of critical lab result at Lactic 4.3.  Critical lab result read back by Dr. Downs.

## 2022-10-21 NOTE — ASSESSMENT & PLAN NOTE
Worse in the upper abdomen  Pain control with Toradol and naproxen as needed  Patient does not want opiate medications  Abdominal ultrasound pending

## 2022-10-21 NOTE — ED NOTES
Pharmacy Medication Reconciliation      ~Medication reconciliation updated and complete per patient at bedside  ~Allergies have been verified and updated   ~No oral ABX within the last 30 days  ~Patient home pharmacy :  [facility]

## 2022-10-21 NOTE — H&P
Hospital Medicine History & Physical Note    Date of Service  10/21/2022    Primary Care Physician  Misael Heredia M.D.    Consultants  GI    Specialist Names: Dr. Appiah     Code Status  Full Code    Chief Complaint  Chief Complaint   Patient presents with    Abnormal Labs     Pt was suppose to get a chemo infusion this am Labs revealed high bilirubin and pt referred to ED for admit and possibly a biliary stent  Hx pancreatic CA with mets to liver       History of Presenting Illness  Ella Kent is a 67 y.o. female who presented 10/21/2022 with abnormal labs.  Patient presented to chemotherapy infusion this morning, however her lab work revealed elevated bilirubin and she was sent to the ER.  Patient was diagnosed with pancreatic cancer with metastasis to the liver she has been seeing Dr. Garcia and her last chemotherapy infusion was 9/23/2022.  Patient reports that she has been having abdominal pain for the last 3 days.  She denies any fevers, chills, chest pain, dyspnea, or diarrhea.  She also noted worsening abdominal distention, usually this resolves when she has a bowel movement however she has been having normal bowel movements last few days without improvement.  She noticed this morning that her eyes were jaundiced, did not notice it previously.  Patient does report that she has been more weak recently, usually she is able to get through her ADLs in the morning however today she noted it took twice as long due to fatigue.    In the ER patient tachycardic otherwise vital stable.  Labs significant for WBC 19, sodium 127, , ALT 56, total bili 9, and lipase negative.  CT abdomen pelvis showed no significant interval change, numerous hepatic metastases similar to prior, grossly unchanged ill-defined pancreatic tail mass.    I discussed the plan of care with patient and family.    Review of Systems  Review of Systems   Constitutional:  Positive for malaise/fatigue. Negative for chills and fever.    HENT:  Negative for congestion.    Eyes:  Negative for blurred vision.   Respiratory:  Negative for shortness of breath.    Cardiovascular:  Negative for chest pain.   Gastrointestinal:  Positive for abdominal pain. Negative for constipation, diarrhea, nausea and vomiting.   Genitourinary:  Negative for dysuria.   Musculoskeletal:  Negative for myalgias.   Skin:  Negative for rash.        jaundice   Neurological:  Positive for weakness. Negative for dizziness and headaches.   Psychiatric/Behavioral:  Negative for depression.    All other systems reviewed and are negative.    Past Medical History   has a past medical history of Cancer (HCC) and Pain.    Surgical History   has a past surgical history that includes kamar by laparoscopy (2018); pr upper gi endoscopy,diagnosis (N/A, 5/20/2022); egd w/endoscopic ultrasound (N/A, 5/20/2022); egd with asp/bx (N/A, 5/20/2022); and cath placement (Right, 6/30/2022).     Family History  Family history reviewed with patient. There is no family history that is pertinent to the chief complaint.     Social History   reports that she has quit smoking. She has never used smokeless tobacco. She reports that she does not currently use alcohol. She reports that she does not use drugs.    Allergies  Allergies   Allergen Reactions    Codeine Nausea     Nauseous with most narcotics    Hydrocodone Nausea       Medications  Prior to Admission Medications   Prescriptions Last Dose Informant Patient Reported? Taking?   naproxen (ALEVE) 220 MG tablet 10/21/2022 at 0730 Patient Yes Yes   Sig: Take 220-440 mg by mouth every 6 hours as needed (Pain).      Facility-Administered Medications: None       Physical Exam  Temp:  [36.3 °C (97.4 °F)-36.7 °C (98 °F)] 36.7 °C (98 °F)  Pulse:  [] 82  Resp:  [15-18] 18  BP: (101-128)/(82-96) 127/82  SpO2:  [96 %-100 %] 100 %  Blood Pressure : 127/82   Temperature: 36.7 °C (98 °F)   Pulse: 82   Respiration: 18   Pulse Oximetry: 100 %       Physical  Exam  Vitals and nursing note reviewed.   Constitutional:       General: She is not in acute distress.     Appearance: She is ill-appearing.      Comments:  at bedside   HENT:      Head: Normocephalic and atraumatic.      Right Ear: External ear normal.      Left Ear: External ear normal.      Nose: Nose normal.      Mouth/Throat:      Mouth: Mucous membranes are dry.      Pharynx: Oropharynx is clear.   Eyes:      General: Scleral icterus present.      Extraocular Movements: Extraocular movements intact.      Conjunctiva/sclera: Conjunctivae normal.      Pupils: Pupils are equal, round, and reactive to light.   Cardiovascular:      Rate and Rhythm: Normal rate and regular rhythm.      Pulses: Normal pulses.      Heart sounds: Normal heart sounds.   Pulmonary:      Effort: Pulmonary effort is normal. No respiratory distress.      Breath sounds: Normal breath sounds.   Abdominal:      General: There is distension.      Palpations: Abdomen is soft.      Tenderness: There is abdominal tenderness.   Musculoskeletal:         General: Normal range of motion.      Cervical back: Normal range of motion and neck supple.      Right lower leg: No edema.      Left lower leg: No edema.   Skin:     General: Skin is warm and dry.      Coloration: Skin is jaundiced.   Neurological:      General: No focal deficit present.      Mental Status: She is alert and oriented to person, place, and time.      Cranial Nerves: No cranial nerve deficit.      Motor: Weakness present.   Psychiatric:         Mood and Affect: Mood normal.         Behavior: Behavior normal.       Laboratory:  Recent Labs     10/21/22  0935   WBC 19.8*   RBC 3.54*   HEMOGLOBIN 13.0   HEMATOCRIT 38.0   .3*   MCH 36.7*   MCHC 34.2   RDW 87.7*   PLATELETCT 116*   MPV 9.6     Recent Labs     10/21/22  0935   SODIUM 127*   POTASSIUM 3.8   CHLORIDE 87*   CO2 21   GLUCOSE 74   BUN 19   CREATININE 0.53   CALCIUM 9.6     Recent Labs     10/21/22  0935  10/21/22  1336   ALTSGPT 56*  --    ASTSGOT 167*  --    ALKPHOSPHAT 812*  --    TBILIRUBIN 9.1*  --    LIPASE  --  28   GLUCOSE 74  --          No results for input(s): NTPROBNP in the last 72 hours.      No results for input(s): TROPONINT in the last 72 hours.    Imaging:  CT-ABDOMEN-PELVIS WITH   Final Result         No significant interval change.      1. Numerous hepatic metastases, similar to prior.   2. Grossly unchanged ill-defined pancreatic tail mass.   3. Moderate abdominopelvic ascites, similar to prior      US-RUQ    (Results Pending)       no X-Ray or EKG requiring interpretation    Assessment/Plan:  Justification for Admission Status  I anticipate this patient will require at least two midnights for appropriate medical management, necessitating inpatient admission because patient with pancreatic cancer, appears to have biliary obstruction with elevated liver enzymes and bilirubin.  CT showed no definite obstruction, ultrasound pending.  If these are negative she will likely need MRCP, GI is also consulted.  Concern for cholangitis due to elevated leukocytosis, started on IV antibiotics.    * Obstructive jaundice due to cancer (HCC)- (present on admission)  Assessment & Plan  Total bili 9 on admission with elevated AST/ALT  CT shows no obvious obstruction  Right upper quadrant ultrasound pending, if this does not show anything patient may need MRCP  GI consulted, Dr. Td Bui CMP    Sepsis (HCC)- (present on admission)  Assessment & Plan  This is Sepsis Present on admission  SIRS criteria identified on my evaluation include: Tachycardia, with heart rate greater than 90 BPM and Leukocytosis, with WBC greater than 12,000  Source is abdominal  Sepsis protocol initiated  Fluid resuscitation ordered per protocol  Crystalloid Fluid Administration: Fluid resuscitation ordered per standard protocol - 30 mL/kg per current or ideal body weight  IV antibiotics as appropriate for source of  sepsis  Reassessment: I have reassessed the patient's hemodynamic status    Cultures pending  Continue IV Zosyn  Right upper quadrant ultrasound pending, may need MRCP      Pancreatic carcinoma (HCC)- (present on admission)  Assessment & Plan  Diagnosed 5/20/22; initiated FOLFIRINOX 7/15/22  Follow up with Dr. Leggett and Dr. Grey    Thrombocytopenia (HCC)- (present on admission)  Assessment & Plan  116 on admission  No signs of bleeding  CBC in a.m.    Hyponatremia  Assessment & Plan  Likely from hypovolemia  IV fluids  Repeat BMP in the a.m.    AP (abdominal pain)  Assessment & Plan  Worse in the upper abdomen  Pain control with Toradol and naproxen as needed  Patient does not want opiate medications  Abdominal ultrasound pending      VTE prophylaxis: SCDs/TEDs

## 2022-10-21 NOTE — CONSULTS
Gastroenterology Consultation    Date of Service  10/21/2022    Referring Physician  Kathi Downs D.O.    Consulting Physician  Ant Appiah M.D.    Reason for Consultation  Elevated liver tests    History of Presenting Illness  67 y.o. female with pancreas cancer on chemotherapy, prior cholecystectomy who presented 10/21/2022 with elevated liver tests, RUQ and epigastric abdominal pain.    Patient has known pancreas tail cancer with liver mets which are progressing.  On FOLFOX chemotherapy but given progression of liver disease and rising CA 19-9, plan for switch in therapy.    She notes developing some epigastric abdominal pain and distention with some ashley colored stools 3 days ago.  No nausea, vomiting, fevers, chills.  Appetite normal.    Had labs prior to chemotherapy and has new elevated liver tests, obstructive pattern and mild leukocytosis and told to come to ED.    During prior EUS for pancreas cancer diagnosis 5/2022, she was noted to have distal CBD stone not removed.    Review of Systems  Review of Systems   Constitutional:  Positive for malaise/fatigue and weight loss.   Gastrointestinal:  Positive for abdominal pain.   Neurological:  Positive for weakness.   All other systems reviewed and are negative.    Past Medical History   has a past medical history of Cancer (HCC) and Pain.    Surgical History   has a past surgical history that includes kamar by laparoscopy (2018); pr upper gi endoscopy,diagnosis (N/A, 5/20/2022); egd w/endoscopic ultrasound (N/A, 5/20/2022); egd with asp/bx (N/A, 5/20/2022); and cath placement (Right, 6/30/2022).    Family History  family history is not on file.    Social History   reports that she has quit smoking. She has never used smokeless tobacco. She reports that she does not currently use alcohol. She reports that she does not use drugs.    Medications    Current Facility-Administered Medications:     senna-docusate **AND** polyethylene glycol/lytes **AND**  magnesium hydroxide **AND** bisacodyl    NS    acetaminophen    ketorolac    naproxen    ondansetron    ondansetron    [DISCONTINUED] piperacillin-tazobactam **AND** piperacillin-tazobactam    Current Outpatient Medications:     naproxen, 220-440 mg, Oral, Q6HRS PRN, 10/21/2022 at 0730      Allergies  Allergies   Allergen Reactions    Codeine Nausea     Nauseous with most narcotics    Hydrocodone Nausea       Physical Exam  Temp:  [36.7 °C (98 °F)] 36.7 °C (98 °F)  Pulse:  [] 86  Resp:  [16-18] 16  BP: (100-127)/(67-91) 100/72  SpO2:  [98 %-100 %] 100 %    Physical Exam  Constitutional:       General: She is not in acute distress.     Appearance: She is ill-appearing. She is not toxic-appearing.   HENT:      Head: Normocephalic and atraumatic.   Eyes:      General: Scleral icterus present.   Cardiovascular:      Rate and Rhythm: Normal rate and regular rhythm.      Pulses: Normal pulses.      Heart sounds: Normal heart sounds.   Pulmonary:      Effort: Pulmonary effort is normal.   Abdominal:      General: Abdomen is flat. Bowel sounds are decreased. There is distension.      Palpations: Abdomen is soft. There is no mass.      Tenderness: There is abdominal tenderness in the right upper quadrant and epigastric area. There is no guarding or rebound.      Hernia: No hernia is present.      Comments: Mild ascites   Musculoskeletal:      Cervical back: Normal range of motion and neck supple.      Right lower leg: No edema.      Left lower leg: No edema.   Skin:     General: Skin is warm and dry.      Coloration: Skin is jaundiced.   Neurological:      General: No focal deficit present.      Mental Status: She is alert and oriented to person, place, and time.   Psychiatric:         Mood and Affect: Mood normal.         Behavior: Behavior normal.       Fluids      Laboratory  Recent Labs     10/21/22  0935   WBC 19.8*   RBC 3.54*   HEMOGLOBIN 13.0   HEMATOCRIT 38.0   .3*   MCH 36.7*   MCHC 34.2   RDW 87.7*    PLATELETCT 116*   MPV 9.6     Recent Labs     10/21/22  0935   SODIUM 127*   POTASSIUM 3.8   CHLORIDE 87*   CO2 21   GLUCOSE 74   BUN 19   CREATININE 0.53   CALCIUM 9.6                     Imaging  CT-ABDOMEN-PELVIS WITH   Final Result         No significant interval change.      1. Numerous hepatic metastases, similar to prior.   2. Grossly unchanged ill-defined pancreatic tail mass.   3. Moderate abdominopelvic ascites, similar to prior      US-RUQ    (Results Pending)   [id number]    (Results Pending)         Assessment/Plan  68 y/o with pancreas tail cancer with liver mets, possible prior CBD stone on EUS, prior hole that presented for abdominal pain and elevated liver tests, obstructive pattern.  Imaging not suggestive of biliary obstruction given normal CBD size.  Concern for possible obstruction secondary to small bile duct obstruction from numerous liver mets versus possible choledocholithiasis.  Recommend MRCP.    PROBLEMS:  Epigastric and RUQ pain  Elevated liver tests  Leukocytosis  Ascites, suspect malignant  Pancreas tail cancer with progressive liver mets    PLAN:  MRCP  Zosyn for possible cholangitis  If no stone on MRCP, then likely nothing intervenable endoscopically and would be difficult situation  Keep NPO at midnight in case she were to need ERCp tomorrow.

## 2022-10-21 NOTE — ASSESSMENT & PLAN NOTE
Pancreatic cancer with known metastasis to the liver  s/p ERCP 10/23/22, with findings of choledocholithiasis treated with sphincterotomy with balloon sweep  Jaundice persists after ERCP  Appreciate GI consult and recommendations, repeat imaging with MRCP  Overall picture is concerning for progression of metastasis    Patient had a major change in clinical status on 10/27/2022.  Patient became lethargic and essentially nonverbal.     After extensive discussion with the patient's , he has elected to transition her to comfort care.  He would still like to pursue hospice in [place] and I have discussed this option with

## 2022-10-22 NOTE — ED NOTES
Pt provided with dinner tray. Repeat lactic drawn and sent to lab. Pt declines any other needs at this time.

## 2022-10-22 NOTE — PROGRESS NOTES
Pulm/ICU    Called to assess for pt in icu as lactic 4.3  Pt presented with abnormal labs  Known hx of pancreatic ca receiving chemo  Worsening ascites and liver mets and now TB is 9  Gi consulted and plan is for MRCP    Pt no signs of SIRS or sepsis and lactic likely due to liver mets  Pt speaking in full sentences, laughing eating  Bp stable in 120s systolic and HR in 80s    At this time no indication for icu admission    D/w Dr. Downs as well as patient and her

## 2022-10-22 NOTE — PROGRESS NOTES
Hospital Medicine Daily Progress Note    Date of Service  10/22/2022    Chief Complaint  Hyperbilirubinemia    Hospital Course  Ella Kent is a 67 y.o. female pancreatic cancer metastatic to the liver, on chemotherapy with last chemotherapy infusion on 9/23/2022, admitted 10/21/2022 with abdominal pain for 3 days, with worsening abdominal distention, jaundice, and progressive weakness.  Evaluation showed WBC of 19,000, sodium 127, AST of 167, ALT 56, bilirubin of 9, and negative lipase.  CT of the abdomen pelvis showed no significant interval change with numerous hepatic metastasis similar to prior, and grossly unchanged ill-defined pancreatic tail mass.  GI was consulted recommended doing MRCP to rule out possible obstruction secondary to small bile duct obstruction from the numerous liver mets versus possible choledocholithiasis.  She is started on Zosyn for possible cholangitis.    Interval Problem Update  10/22/2022 - I reviewed the patient's chart. There were no significant overnight events. Remains hemodynamically stable and afebrile. Stable on RA.  WBC improved to 14,700.  Hemoglobin 11.4.  Platelets 99,000.  Sodium 128.  AST improved to 145, ALT normal, alkaline phosphatase 677, bilirubin 8.0.  Lactate 3.9.  Blood cultures remain negative.  Pending MRCP.    > I have personally seen and examined the patient today.  She is in good spirits today.  She denies any pain, much improved from yesterday.  No nausea, vomiting.  Having regular bowel movements.  No chest pain, shortness of breath.      I have discussed this patient's plan of care and discharge plan at IDT rounds today with Case Management, Nursing, Nursing leadership, and other members of the IDT team.    Consultants/Specialty  GI    Code Status  Full Code    Disposition  Patient is not medically cleared for discharge.   Anticipate discharge to to home with close outpatient follow-up.  I have placed the appropriate orders for post-discharge  needs.    Review of Systems  ROS     Pertinent positives/negatives as mentioned above.     A complete review of systems was personally done by me. All other systems were negative.       Physical Exam  Temp:  [36.3 °C (97.4 °F)-37 °C (98.6 °F)] 37 °C (98.6 °F)  Pulse:  [73-95] 85  Resp:  [16-19] 18  BP: ()/() 106/70  SpO2:  [97 %-100 %] 98 %    Physical Exam  Vitals reviewed.   Constitutional:       General: She is not in acute distress.     Appearance: Normal appearance. She is normal weight. She is ill-appearing. She is not diaphoretic.   HENT:      Head: Normocephalic and atraumatic.      Mouth/Throat:      Mouth: Mucous membranes are moist.      Pharynx: No oropharyngeal exudate or posterior oropharyngeal erythema.   Eyes:      General: Scleral icterus present.      Extraocular Movements: Extraocular movements intact.      Conjunctiva/sclera: Conjunctivae normal.      Pupils: Pupils are equal, round, and reactive to light.   Cardiovascular:      Rate and Rhythm: Normal rate and regular rhythm.      Heart sounds: Normal heart sounds. No murmur heard.  Pulmonary:      Effort: Pulmonary effort is normal. No respiratory distress.      Breath sounds: Normal breath sounds. No stridor. No wheezing, rhonchi or rales.   Chest:      Chest wall: No tenderness.   Abdominal:      General: Bowel sounds are normal. There is no distension.      Palpations: Abdomen is soft. There is no mass.      Tenderness: There is no abdominal tenderness. There is no guarding or rebound.   Musculoskeletal:         General: No swelling. Normal range of motion.      Cervical back: Normal range of motion and neck supple. No rigidity. No muscular tenderness.      Right lower leg: Edema present.      Left lower leg: Edema present.   Lymphadenopathy:      Cervical: No cervical adenopathy.   Skin:     General: Skin is warm and dry.      Coloration: Skin is jaundiced.      Findings: No rash.   Neurological:      General: No focal deficit  present.      Mental Status: She is alert and oriented to person, place, and time. Mental status is at baseline.      Cranial Nerves: No cranial nerve deficit.   Psychiatric:         Mood and Affect: Mood normal.         Behavior: Behavior normal.         Thought Content: Thought content normal.         Judgment: Judgment normal.       Fluids    Intake/Output Summary (Last 24 hours) at 10/22/2022 1306  Last data filed at 10/22/2022 0800  Gross per 24 hour   Intake 0 ml   Output --   Net 0 ml       Laboratory  Recent Labs     10/21/22  0935 10/22/22  0110   WBC 19.8* 14.7*   RBC 3.54* 3.15*   HEMOGLOBIN 13.0 11.4*   HEMATOCRIT 38.0 33.8*   .3* 107.3*   MCH 36.7* 36.2*   MCHC 34.2 33.7   RDW 87.7* 86.6*   PLATELETCT 116* 99*   MPV 9.6 10.3     Recent Labs     10/21/22  0935 10/22/22  0110   SODIUM 127* 128*   POTASSIUM 3.8 3.8   CHLORIDE 87* 93*   CO2 21 22   GLUCOSE 74 108*   BUN 19 18   CREATININE 0.53 0.51   CALCIUM 9.6 8.8     Recent Labs     10/21/22  1550   INR 1.50*               Imaging  [id number]   Final Result      1. A 3 mm distal CBD stone. No significant upstream biliary dilatation.   2. Prior cholecystectomy.   3. Known pancreatic mass, unchanged since recent CT.   4. Innumerable bilobar hepatic metastases.   5. Partially visualized small to moderate ascites.   6. Trace bilateral pleural effusions.         US-RUQ   Final Result      1. Multiple masses throughout the liver are redemonstrated, in keeping with known hepatic metastasis.   2. Moderate abdominal ascites.   3. Poor flow detected in the main portal vein, likely artifact. Patent portal vein is seen on same-day CT.      CT-ABDOMEN-PELVIS WITH   Final Result         No significant interval change.      1. Numerous hepatic metastases, similar to prior.   2. Grossly unchanged ill-defined pancreatic tail mass.   3. Moderate abdominopelvic ascites, similar to prior           Assessment/Plan  * Obstructive jaundice due to cancer (HCC)-  (present on admission)  Assessment & Plan  - In setting of known pancreatic cancer metastatic to the liver.  Total bilirubin 9 on admission, with elevated AST and ALT.  CT scan showed no significant interval changes with numerous hepatic metastasis similar to prior, and grossly unchanged ill-defined pancreatic tail mass.  -GI consulted, awaiting MRCP to rule out possible obstruction secondary to small bile duct obstruction from the numerous liver mets versus possible choledocholithiasis.  If positive, will need ERCP.  -Continue Zosyn for possible cholangitis.  Trend WBC count.  Watch for fevers.  -Trend LFTs.    Sepsis (HCC)- (present on admission)  Assessment & Plan  -This is Sepsis Present on admission  -SIRS criteria identified on my evaluation include: Tachycardia, with heart rate greater than 90 BPM and Leukocytosis, with WBC greater than 12,000  -Source is abdominal, possible cholangitis.  -Continue sepsis protocol, and fluid resuscitation.  -Continue IV antibiotics as appropriate for source of sepsis.  Continue Zosyn.  Follow cultures.  -Close hemodynamic monitoring.  Trend lactate.    Hyponatremia- (present on admission)  Assessment & Plan  -Likely from hypovolemia, also SIADH from pain and cancer.  -Continue hydration with IV fluids, NS at 125 cc/h.  -Trend sodium levels.    Pancreatic carcinoma (HCC)- (present on admission)  Assessment & Plan  -Diagnosed 5/20/22; initiated FOLFIRINOX 7/15/22  -Follow up with Dr. Leggett and Dr. Grey  -Continue pain control with Toradol, and naproxen.  She refuses opioid analgesics.    Thrombocytopenia (HCC)- (present on admission)  Assessment & Plan  -116 on admission, 99 this morning.  -No signs of bleeding.  -Continue to monitor, especially on antibiotics.  CBC in the morning.       VTE prophylaxis: SCDs/TEDs

## 2022-10-22 NOTE — PROGRESS NOTES
12-hour chart check complete.    Monitor Summary  Rhythm: SR  Rate: 74-88  Ectopy: PVC  Measurements: 0.12/0.08/0.40

## 2022-10-22 NOTE — PROGRESS NOTES
Gastroenterology PROGRESS NOTES    Date of Service  10/22/2022    Referring Physician  Kathi Downs D.O.    Consulting Physician  ROCHELLE Roman.    Reason for Consultation  Elevated liver tests    History of Presenting Illness  67 y.o. female with pancreas cancer on chemotherapy, prior cholecystectomy who presented 10/21/2022 with elevated liver tests, RUQ and epigastric abdominal pain.    Patient has known pancreas tail cancer with liver mets which are progressing.  On FOLFOX chemotherapy but given progression of liver disease and rising CA 19-9, plan for switch in therapy.    She notes developing some epigastric abdominal pain and distention with some ashley colored stools 3 days ago.  No nausea, vomiting, fevers, chills.  Appetite normal.    Had labs prior to chemotherapy and has new elevated liver tests, obstructive pattern and mild leukocytosis and told to come to ED.    During prior EUS for pancreas cancer diagnosis 5/2022, she was noted to have distal CBD stone not removed.    INTERVAL HISTORY:    10/22/22: Stable. No acute overnight events. Denies abdominal pain. LFT's trending down. Total bilirubin: 8.0 (9.1 on 10/21/22). AST: 145. ALT: 47. ALP: 677.     MRCP: A 3 mm distal CBD stone. No significant upstream biliary dilatation.  Prior cholecystectomy. Known pancreatic mass, unchanged since recent CT.  Innumerable bilobar hepatic metastases. Partially visualized small to moderate ascites.  Trace bilateral pleural effusions.   .    Review of Systems  Review of Systems   Constitutional:  Positive for malaise/fatigue and weight loss.   Gastrointestinal:  Negative for abdominal pain.   Neurological:  Positive for weakness.   All other systems reviewed and are negative.    Past Medical History   has a past medical history of Cancer (HCC) and Pain.    Surgical History   has a past surgical history that includes kamar by laparoscopy (2018); pr upper gi endoscopy,diagnosis (N/A, 5/20/2022); egd  w/endoscopic ultrasound (N/A, 5/20/2022); egd with asp/bx (N/A, 5/20/2022); and cath placement (Right, 6/30/2022).    Family History  family history is not on file.    Social History   reports that she has quit smoking. She has never used smokeless tobacco. She reports that she does not currently use alcohol. She reports that she does not use drugs.    Medications    Current Facility-Administered Medications:     senna-docusate **AND** polyethylene glycol/lytes **AND** magnesium hydroxide **AND** bisacodyl    NS    acetaminophen    ketorolac    naproxen    ondansetron    ondansetron    [DISCONTINUED] piperacillin-tazobactam **AND** piperacillin-tazobactam      Allergies  Allergies   Allergen Reactions    Codeine Nausea     Nauseous with most narcotics    Hydrocodone Nausea       Physical Exam  Temp:  [36.3 °C (97.4 °F)-36.4 °C (97.5 °F)] 36.3 °C (97.4 °F)  Pulse:  [73-95] 81  Resp:  [16-19] 18  BP: ()/() 115/73  SpO2:  [97 %-100 %] 100 %    Physical Exam  Constitutional:       General: She is not in acute distress.     Appearance: She is ill-appearing. She is not toxic-appearing.   HENT:      Head: Normocephalic and atraumatic.   Eyes:      General: Scleral icterus present.   Cardiovascular:      Rate and Rhythm: Normal rate and regular rhythm.      Pulses: Normal pulses.      Heart sounds: Normal heart sounds.   Pulmonary:      Effort: Pulmonary effort is normal.   Abdominal:      General: Abdomen is flat. Bowel sounds are decreased. There is distension.      Palpations: Abdomen is soft. There is no mass.      Tenderness: There is no abdominal tenderness. There is no guarding or rebound.      Hernia: No hernia is present.      Comments: Mild ascites   Musculoskeletal:      Cervical back: Normal range of motion and neck supple.      Right lower leg: No edema.      Left lower leg: No edema.   Skin:     General: Skin is warm and dry.      Coloration: Skin is jaundiced.   Neurological:      General: No  focal deficit present.      Mental Status: She is alert and oriented to person, place, and time.   Psychiatric:         Mood and Affect: Mood normal.         Behavior: Behavior normal.       Fluids      Laboratory  Recent Labs     10/21/22  0935 10/22/22  0110   WBC 19.8* 14.7*   RBC 3.54* 3.15*   HEMOGLOBIN 13.0 11.4*   HEMATOCRIT 38.0 33.8*   .3* 107.3*   MCH 36.7* 36.2*   MCHC 34.2 33.7   RDW 87.7* 86.6*   PLATELETCT 116* 99*   MPV 9.6 10.3       Recent Labs     10/21/22  0935 10/22/22  0110   SODIUM 127* 128*   POTASSIUM 3.8 3.8   CHLORIDE 87* 93*   CO2 21 22   GLUCOSE 74 108*   BUN 19 18   CREATININE 0.53 0.51   CALCIUM 9.6 8.8       Recent Labs     10/21/22  1550   INR 1.50*                   Imaging  US-RUQ   Final Result      1. Multiple masses throughout the liver are redemonstrated, in keeping with known hepatic metastasis.   2. Moderate abdominal ascites.   3. Poor flow detected in the main portal vein, likely artifact. Patent portal vein is seen on same-day CT.      CT-ABDOMEN-PELVIS WITH   Final Result         No significant interval change.      1. Numerous hepatic metastases, similar to prior.   2. Grossly unchanged ill-defined pancreatic tail mass.   3. Moderate abdominopelvic ascites, similar to prior      [id number]    (Results Pending)         Assessment/Plan  66 y/o with pancreas tail cancer with liver mets, possible prior CBD stone on EUS, prior hole that presented for abdominal pain and elevated liver tests, obstructive pattern.  Imaging not suggestive of biliary obstruction given normal CBD size.  Concern for possible obstruction secondary to small bile duct obstruction from numerous liver mets versus possible choledocholithiasis.  Recommend MRCP.    PROBLEMS:  Epigastric and RUQ pain-->MRCP positive for choledocholithiasis  Elevated liver tests  Leukocytosis  Ascites, suspect malignant  Pancreas tail cancer with progressive liver mets    PLAN:  The risk and benefits of the ERCP,  including but not limited to bleeding, perforation, infection, cholangitis, missed lesions, pancreatitis mild to severe, prolonged hospitalization, need for surgery were all discussed with the patient and she verbalizes understanding.  She wishes to proceed forward with the plan of care.   Continue Zosyn   Regular diet.  NPO after midnight

## 2022-10-23 NOTE — ANESTHESIA TIME REPORT
Anesthesia Start and Stop Event Times     Date Time Event    10/23/2022 0746 Ready for Procedure     0751 Anesthesia Start     0828 Anesthesia Stop        Responsible Staff  10/23/22    Name Role Begin End    Tiffany Cota M.D. Anesth 0751 0828        Overtime Reason:  no overtime (within assigned shift)    Comments:

## 2022-10-23 NOTE — ANESTHESIA PROCEDURE NOTES
Airway    Date/Time: 10/23/2022 7:55 AM  Performed by: Tiffany Cota M.D.  Authorized by: Tiffany Cota M.D.     Location:  OR  Urgency:  Elective  Indications for Airway Management:  Anesthesia      Spontaneous Ventilation: absent    Sedation Level:  Deep  Preoxygenated: Yes    Patient Position:  Sniffing  Mask Difficulty Assessment:  1 - vent by mask  Final Airway Type:  Endotracheal airway  Final Endotracheal Airway:  ETT  Cuffed: Yes    Technique Used for Successful ETT Placement:  Direct laryngoscopy  Devices/Methods Used in Placement:  Intubating stylet    Insertion Site:  Oral  Blade Type:  Yin  Laryngoscope Blade/Videolaryngoscope Blade Size:  3  ETT Size (mm):  6.5  Measured from:  Teeth  ETT to Teeth (cm):  19  Placement Verified by: auscultation and capnometry    Cormack-Lehane Classification:  Grade I - full view of glottis  Number of Attempts at Approach:  1

## 2022-10-23 NOTE — PROGRESS NOTES
Telemetry Shift Summary    Rhythm NSR  HR Range 78-96  Ectopy rPVC/PAC  Measurements 0.14/0.08/0.34        Normal Values  Rhythm SR  HR Range    Measurements 0.12-0.20 / 0.06-0.10  / 0.30-0.52

## 2022-10-23 NOTE — ANESTHESIA PREPROCEDURE EVALUATION
Case: [id number] Date/Time: 10/23/22 0800    Procedure: ERCP (ENDOSCOPIC RETROGRADE CHOLANGIOPANCREATOGRAPHY)    Location:  ENDOSCOPIC ULTRASOUND ROOM / SURGERY [facility]    Surgeons: Efrain Aldrich M.D.          Relevant Problems   NEURO   (positive) History of known metastasis to liver      GI   (positive) Gastroesophageal reflux disease without esophagitis       Physical Exam    Airway   Mallampati: I  TM distance: >3 FB  Neck ROM: full       Cardiovascular - normal exam     Dental - normal exam           Pulmonary   Breath sounds clear to auscultation     Abdominal    Neurological - normal exam                 Anesthesia Plan    ASA 3 (pancreatic CA w/ elevated liver enzymes )   ASA physical status 3 criteria: other (comment)    Plan - general       Airway plan will be ETT          Induction: intravenous      Pertinent diagnostic labs and testing reviewed    Informed Consent:    Anesthetic plan and risks discussed with patient.

## 2022-10-23 NOTE — PROCEDURES
Endoscopic Retrograde Cholangiopancreatography    Date of Procedure:  10/23/2022  Attending Physician:  Efrain Aldrich MD  Indications: Choledocholithiasis, elevated LFT      Instrument: Olympus Flexible Sideviewing Endoscope  Sedation:     Anesthesiologist/Type of Anesthesia:  Anesthesiologist: Tiffany Cota M.D./General    Surgical Staff:  Circulator: Allison Greco R.N.  Endoscopy Technician: Elizabeth Cope    Specimens removed if any:  ID Type Source Tests Collected by Time Destination   A : duodenal biopsy Tissue Duodenum PATHOLOGY SPECIMEN Efrain Aldrich M.D. 10/23/2022  8:18 AM    B : gastric biopsy Tissue Gastric PATHOLOGY SPECIMEN Efrain Aldrich M.D. 10/23/2022  8:20 AM        Pre-Anesthesia Assessment:  Prior to the procedure, a History and Physical was performed, and patient medications and allergies were reviewed. The patient’s tolerance of previous anesthesia was also reviewed. The risks and benefits of the procedure and the sedation options and risks were discussed with the patient including but not limited to infection, bleeding, aspiration, perforation, adverse medication reaction, missed diagnosis, missed lesions, and pancreatitis. The patient verbalized understanding. All questions were answered, and informed consent was obtained    After I obtained informed consent from the patient, the patient was placed in the prone/swimmer position. Appropriate time-out protocol was followed: the correct patient, the correct procedure, and the correct equipment in the room were confirmed. Throughout the procedure, the patient’s blood pressure, pulse, and oxygen saturations were monitored continuously. The Olymus flexible sideviewing duodenoscope was inserted through the oropharynx, esophagus intubated, then advanced to the gastrointestinal tract to the major papilla. The duct(s) were cannulated and contrast was injected I personally interpreted the ductal images.  Findings and interventions were performed and documented  below. Air was then withdrawn and the duodenoscope was removed. The patient tolerated the procedure well. There were no immediate postoperative complications    Findings:    film demonstrated right upper quadrant clip consistent with cholecystectomy.  Scope was inserted to second portion of duodenum without difficulty.  Upon insertion there was mucosal erosion/ulceration in the duodenum transition immediately above the papilla.  Cannulation with a 0.025 wire sphincterotome was pure.  Throughout the entire procedure pancreatic duct was never cannulated or injected.  Wire was inserted to the left intrahepatic ductal system.  Test cholangiogram demonstrated filling defect as well as demonstrated anatomy.  A traction biliary sphincterotomy was was performed to the edge of transverse fold.  Utilizing a 9-12 mm extractor balloon 1 stone was removed without difficulty.  Multiple additional sweeps were performed to ensure no residual debris.  Stepwise occlusion cholangiogram demonstrated no additional filling defect.'s please see fluoroscopic images.  Impacted cholangiogram demonstrated no extravasation of contrast.  Additional sweeps were then performed to remove excisional contrast.  Utilizing side-viewing duodena scope forcep biopsy was performed of the edge of the ulceration.  Biopsy of the stomach was also performed with a side-viewing duodena scope to ensure no ulcer.  No obvious gastric ulcer was seen.    Impressions:   Choledocholithiasis treated with ERCP sphincterotomy balloon sweep  Duodenal ulceration/erosion at the D1-D2 transition biopsy with forceps. No active bleeding was seen.  Gastric erythema biopsy    Recommendations:   Monitor for postprocedure complication including perforation bleeding pancreatitis  Trend LFTs  Await biopsy result  Start PPI therapy for at least 4 weeks  Ideally avoid anticoagulation for 3 days to avoid sphincterotomy bleeding however if necessary may resume immediately if  benefit outweighs risk  6. Ice chips/clear liquid diet immediately; if pain stop and NPO. If no worsening pain in 4 hours can advance as tolerated.       NOTE: Radiologic interpretation of dynamic and static fluoroscopic imaging by myself.  At no time was/were a Radiologist present.     This note was generated using voice recognition software which has a small chance of producing errors of grammar and possibly content. I have made every reasonable attempt to find and correct any obvious errors, but expect that some may not be found prior to finalization of this note

## 2022-10-23 NOTE — ANESTHESIA POSTPROCEDURE EVALUATION
Patient: Ella Kent    Procedure Summary     Date: 10/23/22 Room / Location:  ENDOSCOPIC ULTRASOUND ROOM / SURGERY [facility]    Anesthesia Start: 0751 Anesthesia Stop: 0828    Procedure: ERCP (ENDOSCOPIC RETROGRADE CHOLANGIOPANCREATOGRAPHY) with sphincterotomy balloon sweep and biopsies (Mouth) Diagnosis: (Choledocholithiasis, Duodenal ulceration/erosion at the D1-D2 transition, Gastric erythema)    Surgeons: Efrain Aldrich M.D. Responsible Provider: Tiffany Cota M.D.    Anesthesia Type: general ASA Status: 3          Final Anesthesia Type: general  Last vitals  BP   Blood Pressure : 129/78, NIBP: 138/91    Temp   36 °C (96.8 °F)    Pulse   72   Resp   16    SpO2   95 %      Anesthesia Post Evaluation    Patient location during evaluation: PACU  Patient participation: complete - patient participated  Level of consciousness: awake and alert  Pain score: 4    Airway patency: patent  Anesthetic complications: no  Cardiovascular status: adequate  Respiratory status: acceptable  Hydration status: acceptable    PONV: none          No notable events documented.     Nurse Pain Score: 4 (NPRS)

## 2022-10-23 NOTE — OR NURSING
Patient allergies and NPO status verified. Pt verbalizes understanding of pain scale, expected course of stay, and plan of care. Procedure verified w/ pt. IV access checked for patency.

## 2022-10-23 NOTE — PROGRESS NOTES
Indication:Bile duct stone, elevated LFT  Risks, benefits, and alternatives were discussed with consenting person(s). Consenting person(s) were given an opportunity to ask questions and discuss other options. Risks including but not limited to failed or incomplete ERCP with possible stent, ineffective therapy, perforation, infection, bleeding, missed lesion(s), missed diagnosis, cardiac and/or pulmonary event, aspiration, stroke, possible need for surgery, hospitalization possibly prolonged, discomfort, unsuccessful and/or incomplete procedure, possible need for repeat procedures and/or additional testings, damage to adjacent organs and/or vascular structures, medication reaction, disability, death, and other adverse events possibly life-threatening. Discussion was undertaken with Layman's terms. Consenting persons stated understanding and acceptance of these risks, and wished to proceed. Consent was given in clear state of mind.

## 2022-10-23 NOTE — OR NURSING
0825 PT RECEIVED IN PACU, REPORT RECEIVED.  VSS, RESP SPONT, EVEN, NON LABORED.    0840 DR. LEE AT BEDSIDE. SEVERAL ATTEMPTS TO UPDATE POC TO . UNABLE TO LEAVE MESSAGE. VSS     0847 PT WAKES TO VERBAL STIMULI. DENIES PAIN, NAUSEA.     0853 PT AWAKE, REPORTS SORE RUQ PAIN 4 AND TOLERABLE. DENIES NAUSEA.      0855 VSS.     0905 PT REPORTS RUQ PAIN 3/10, DENIES NAUSEA. TOLERATING PO ICE CHIPS.  VSS. BOWEL SOUNDS AUSCULTATED X4 QUAD.      0915 PT MEETS CRITERIA FOR TRANSFER TO ROOM.

## 2022-10-23 NOTE — PROGRESS NOTES
Hospital Medicine Daily Progress Note    Date of Service  10/23/2022    Chief Complaint  Hyperbilirubinemia    Hospital Course  Ella Kent is a 67 y.o. female pancreatic cancer metastatic to the liver, on chemotherapy with last chemotherapy infusion on 9/23/2022, admitted 10/21/2022 with abdominal pain for 3 days, with worsening abdominal distention, jaundice, and progressive weakness.  Evaluation showed WBC of 19,000, sodium 127, AST of 167, ALT 56, bilirubin of 9, and negative lipase.  Lactate was elevated.  CT of the abdomen pelvis showed no significant interval change with numerous hepatic metastasis similar to prior, and grossly unchanged ill-defined pancreatic tail mass.  GI was consulted recommended doing MRCP to rule out possible obstruction secondary to small bile duct obstruction from the numerous liver mets versus possible choledocholithiasis.  She is started on Zosyn for possible cholangitis, along with IV fluids for sepsis.    Interval Problem Update  10/23/2022 - I reviewed the patient's chart today. Uneventful night. VSS. Afebrile. Saturating well on RA.  WBC 16,200.  Platelet count 89,000.  Hemoglobin stable.  Potassium 3.4.  Sodium 133.  Creatinine 0.47.  Total bilirubin 8.7.  Lactate remains elevated, but has trended down.  MRCP showed 3 mm distal CBD stone.  She had an ERCP this morning, with findings of choledocholithiasis treated with sphincterotomy with balloon sweep.     > I have personally seen and examined the patient today.  She is still recovering from anesthesia/sedation.  Otherwise she is doing well.  She has some discomfort in her abdomen.  Not nauseated.  No vomiting.  Answers questions appropriately.    I have discussed this patient's plan of care and discharge plan at IDT rounds today with Case Management, Nursing, Nursing leadership, and other members of the IDT team.    Consultants/Specialty  GI    Code Status  Full Code    Disposition  Patient is not medically cleared  for discharge.   Anticipate discharge to to home with close outpatient follow-up.  I have placed the appropriate orders for post-discharge needs.    Review of Systems  ROS     Pertinent positives/negatives as mentioned above.     A complete review of systems was personally done by me. All other systems were negative.       Physical Exam  Temp:  [36 °C (96.8 °F)-37.1 °C (98.8 °F)] 36.3 °C (97.3 °F)  Pulse:  [71-92] 71  Resp:  [12-19] 16  BP: (106-130)/(70-88) 128/82  SpO2:  [95 %-100 %] 96 %    Physical Exam  Vitals reviewed.   Constitutional:       General: She is not in acute distress.     Appearance: Normal appearance. She is normal weight. She is ill-appearing. She is not diaphoretic.   HENT:      Head: Normocephalic and atraumatic.      Mouth/Throat:      Mouth: Mucous membranes are moist.      Pharynx: No oropharyngeal exudate or posterior oropharyngeal erythema.   Eyes:      General: Scleral icterus present.      Extraocular Movements: Extraocular movements intact.      Conjunctiva/sclera: Conjunctivae normal.      Pupils: Pupils are equal, round, and reactive to light.   Cardiovascular:      Rate and Rhythm: Normal rate and regular rhythm.      Heart sounds: Normal heart sounds. No murmur heard.  Pulmonary:      Effort: Pulmonary effort is normal. No respiratory distress.      Breath sounds: Normal breath sounds. No stridor. No wheezing, rhonchi or rales.   Chest:      Chest wall: No tenderness.   Abdominal:      General: Bowel sounds are normal. There is no distension.      Palpations: Abdomen is soft. There is no mass.      Tenderness: There is no abdominal tenderness. There is no guarding or rebound.   Musculoskeletal:         General: No swelling. Normal range of motion.      Cervical back: Normal range of motion and neck supple. No rigidity. No muscular tenderness.      Right lower leg: Edema present.      Left lower leg: Edema present.   Lymphadenopathy:      Cervical: No cervical adenopathy.   Skin:      General: Skin is warm and dry.      Coloration: Skin is jaundiced.      Findings: No rash.   Neurological:      General: No focal deficit present.      Mental Status: She is alert and oriented to person, place, and time. Mental status is at baseline.      Cranial Nerves: No cranial nerve deficit.   Psychiatric:         Mood and Affect: Mood normal.         Behavior: Behavior normal.         Thought Content: Thought content normal.         Judgment: Judgment normal.     I have performed the physical examination today 10/23/2022.  In review of yesterday's note, there are no new changes except as documented above.      Fluids    Intake/Output Summary (Last 24 hours) at 10/23/2022 1034  Last data filed at 10/23/2022 0915  Gross per 24 hour   Intake 1200 ml   Output --   Net 1200 ml       Laboratory  Recent Labs     10/21/22  0935 10/22/22  0110 10/23/22  0401   WBC 19.8* 14.7* 16.2*   RBC 3.54* 3.15* 3.44*   HEMOGLOBIN 13.0 11.4* 12.6   HEMATOCRIT 38.0 33.8* 38.2   .3* 107.3* 111.0*   MCH 36.7* 36.2* 36.6*   MCHC 34.2 33.7 33.0*   RDW 87.7* 86.6* 94.0*   PLATELETCT 116* 99* 89*   MPV 9.6 10.3 10.4     Recent Labs     10/21/22  0935 10/22/22  0110 10/23/22  0401   SODIUM 127* 128* 133*   POTASSIUM 3.8 3.8 3.4*   CHLORIDE 87* 93* 97   CO2 21 22 20   GLUCOSE 74 108* 92   BUN 19 18 16   CREATININE 0.53 0.51 0.47*   CALCIUM 9.6 8.8 8.5     Recent Labs     10/21/22  1550   INR 1.50*               Imaging  [id number]   Final Result      Fluoroscopic image(s) obtained during ERCP. Please see the patient's chart for full procedural details.      Fluoroscopy time less than 1 minute.      Fluoroscopy Dose-Area Product: 3.28 Gy*cm^2.         PB-OMXYALN-D/O   Final Result      1. A 3 mm distal CBD stone. No significant upstream biliary dilatation.   2. Prior cholecystectomy.   3. Known pancreatic mass, unchanged since recent CT.   4. Innumerable bilobar hepatic metastases.   5. Partially visualized small to  moderate ascites.   6. Trace bilateral pleural effusions.         US-RUQ   Final Result      1. Multiple masses throughout the liver are redemonstrated, in keeping with known hepatic metastasis.   2. Moderate abdominal ascites.   3. Poor flow detected in the main portal vein, likely artifact. Patent portal vein is seen on same-day CT.      CT-ABDOMEN-PELVIS WITH   Final Result         No significant interval change.      1. Numerous hepatic metastases, similar to prior.   2. Grossly unchanged ill-defined pancreatic tail mass.   3. Moderate abdominopelvic ascites, similar to prior      DX-PORTABLE FLUOROSCOPY < 1 HOUR    (Results Pending)        Assessment/Plan  * Obstructive jaundice due to cancer (HCC)- (present on admission)  Assessment & Plan  - In setting of known pancreatic cancer metastatic to the liver.  Total bilirubin 9 on admission, with elevated AST and ALT.  CT scan showed no significant interval changes with numerous hepatic metastasis similar to prior, and grossly unchanged ill-defined pancreatic tail mass. RCP showed 3 mm distal CBD stone.    - s/p ERCP 10/23/22, with findings of choledocholithiasis treated with sphincterotomy with balloon sweep.   -Continue Zosyn for possible cholangitis.  Trend WBC count, want to see that this trends down.  Watch for fevers.  -Trend LFTs.    Sepsis (HCC)- (present on admission)  Assessment & Plan  -This is Sepsis Present on admission  -SIRS criteria identified on my evaluation include: Tachycardia, with heart rate greater than 90 BPM and Leukocytosis, with WBC greater than 12,000  -Source is abdominal, possible cholangitis.  -Continue sepsis protocol, and fluid resuscitation.  -Continue IV antibiotics as appropriate for source of sepsis.  Continue Zosyn.  Follow cultures.  Trend WBC count.  -Close hemodynamic monitoring.  Has elevated lactate, but hemodynamically stable.      Hyponatremia- (present on admission)  Assessment & Plan  -Likely from hypovolemia, also  SIADH from pain and cancer.  -Improved.  Continue hydration with IV fluids, NS at 125 cc/h.  -Trend sodium levels.    Pancreatic carcinoma (HCC)- (present on admission)  Assessment & Plan  -Diagnosed 5/20/22; initiated FOLFIRINOX 7/15/22  -Follow up with Dr. Leggett and Dr. Grey  -Continue pain control with Toradol, and naproxen.  She refuses opioid analgesics.    Thrombocytopenia (HCC)- (present on admission)  Assessment & Plan  -116 on admission, 89 this morning.  -No signs of bleeding.  -Continue to monitor, especially on antibiotics.  CBC in the morning.       VTE prophylaxis: SCDs/TEDs

## 2022-10-24 NOTE — PROGRESS NOTES
Gastroenterology Progress Note    Date of Service  10/24/2022    Referring Physician  Kathi Downs D.O.    Consulting Physician  MANNIE Fuentes    Reason for Consultation  Elevated liver tests    History of Presenting Illness  67 y.o. female with pancreas cancer on chemotherapy, prior cholecystectomy who presented 10/21/2022 with elevated liver tests, RUQ and epigastric abdominal pain.    Patient has known pancreas tail cancer with liver mets which are progressing.  On FOLFOX chemotherapy but given progression of liver disease and rising CA 19-9, plan for switch in therapy.    She notes developing some epigastric abdominal pain and distention with some ashley colored stools 3 days ago.  No nausea, vomiting, fevers, chills.  Appetite normal.    Had labs prior to chemotherapy and has new elevated liver tests, obstructive pattern and mild leukocytosis and told to come to ED.    During prior EUS for pancreas cancer diagnosis 5/2022, she was noted to have distal CBD stone not removed.    INTERVAL HISTORY:    10/22/22: Stable. No acute overnight events. Denies abdominal pain. LFT's trending down. Total bilirubin: 8.0 (9.1 on 10/21/22). AST: 145. ALT: 47. ALP: 677.     MRCP: A 3 mm distal CBD stone. No significant upstream biliary dilatation.  Prior cholecystectomy. Known pancreatic mass, unchanged since recent CT.  Innumerable bilobar hepatic metastases. Partially visualized small to moderate ascites.  Trace bilateral pleural effusions.    10/23/2022: ERCP  Impressions:   Choledocholithiasis treated with ERCP sphincterotomy balloon sweep  Duodenal ulceration/erosion at the D1-D2 transition biopsy with forceps. No active bleeding was seen.  Gastric erythema biopsy    10/24/2022: Patient does have mild upper abdominal pain, ongoing issue per patient and not new.  No nausea or vomiting.  No fevers or chills.  Her AST and ALT came down slightly as well as alkaline phosphatase, however bilirubin still quite elevated  8.8.  No significant improvement from yesterday 8.7.   .    Review of Systems  Review of Systems   Constitutional:  Positive for malaise/fatigue and weight loss.   Respiratory:  Negative for cough and hemoptysis.    Cardiovascular:  Negative for chest pain and palpitations.   Gastrointestinal:  Positive for abdominal pain. Negative for heartburn and nausea.   Skin:  Negative for itching and rash.   Neurological:  Positive for weakness.   All other systems reviewed and are negative.    Past Medical History   has a past medical history of Cancer (HCC) and Pain.    Surgical History   has a past surgical history that includes kamar by laparoscopy (2018); pr upper gi endoscopy,diagnosis (N/A, 5/20/2022); egd w/endoscopic ultrasound (N/A, 5/20/2022); egd with asp/bx (N/A, 5/20/2022); and cath placement (Right, 6/30/2022).    Family History  family history is not on file.    Social History   reports that she has quit smoking. She has never used smokeless tobacco. She reports that she does not currently use alcohol. She reports that she does not use drugs.    Medications    Current Facility-Administered Medications:     heparin lock flush    omeprazole    senna-docusate **AND** polyethylene glycol/lytes **AND** magnesium hydroxide **AND** bisacodyl    NS    acetaminophen    ketorolac    naproxen    ondansetron    ondansetron    [DISCONTINUED] piperacillin-tazobactam **AND** piperacillin-tazobactam      Allergies  Allergies   Allergen Reactions    Codeine Nausea     Nauseous with most narcotics    Hydrocodone Nausea       Physical Exam  Temp:  [36.3 °C (97.4 °F)-37.2 °C (98.9 °F)] 36.3 °C (97.4 °F)  Pulse:  [72-96] 95  Resp:  [16-19] 18  BP: (118-130)/(71-92) 130/86  SpO2:  [96 %-100 %] 99 %    Physical Exam  Constitutional:       General: She is not in acute distress.     Appearance: She is ill-appearing. She is not toxic-appearing.   HENT:      Head: Normocephalic and atraumatic.      Right Ear: External ear normal.       Left Ear: External ear normal.      Nose: Nose normal.   Eyes:      General: Scleral icterus present.   Cardiovascular:      Rate and Rhythm: Normal rate and regular rhythm.      Pulses: Normal pulses.      Heart sounds: Normal heart sounds.   Pulmonary:      Effort: Pulmonary effort is normal.   Abdominal:      General: Bowel sounds are normal. There is distension.      Palpations: Abdomen is soft. There is no mass.      Tenderness: There is no guarding or rebound.      Hernia: No hernia is present.   Musculoskeletal:      Cervical back: Normal range of motion and neck supple.      Right lower leg: No edema.      Left lower leg: No edema.   Skin:     General: Skin is warm and dry.      Coloration: Skin is jaundiced.   Neurological:      General: No focal deficit present.      Mental Status: She is alert and oriented to person, place, and time.   Psychiatric:         Mood and Affect: Mood normal.         Behavior: Behavior normal.       Fluids      Laboratory  Recent Labs     10/22/22  0110 10/23/22  0401 10/24/22  0300   WBC 14.7* 16.2* 16.8*   RBC 3.15* 3.44* 3.25*   HEMOGLOBIN 11.4* 12.6 11.7*   HEMATOCRIT 33.8* 38.2 35.9*   .3* 111.0* 110.5*   MCH 36.2* 36.6* 36.0*   MCHC 33.7 33.0* 32.6*   RDW 86.6* 94.0* 94.6*   PLATELETCT 99* 89* 97*   MPV 10.3 10.4 9.9       Recent Labs     10/22/22  0110 10/23/22  0401 10/24/22  0300   SODIUM 128* 133* 133*   POTASSIUM 3.8 3.4* 3.5*   CHLORIDE 93* 97 99   CO2 22 20 16*   GLUCOSE 108* 92 78   BUN 18 16 18   CREATININE 0.51 0.47* 0.60   CALCIUM 8.8 8.5 8.0*       Recent Labs     10/21/22  1550   INR 1.50*                   Imaging  [id number]   Final Result      Fluoroscopic image(s) obtained during ERCP. Please see the patient's chart for full procedural details.      Fluoroscopy time less than 1 minute.      Fluoroscopy Dose-Area Product: 3.28 Gy*cm^2.         [id number]-O/O   Final Result      1. A 3 mm distal CBD stone. No significant upstream biliary  dilatation.   2. Prior cholecystectomy.   3. Known pancreatic mass, unchanged since recent CT.   4. Innumerable bilobar hepatic metastases.   5. Partially visualized small to moderate ascites.   6. Trace bilateral pleural effusions.         US-RUQ   Final Result      1. Multiple masses throughout the liver are redemonstrated, in keeping with known hepatic metastasis.   2. Moderate abdominal ascites.   3. Poor flow detected in the main portal vein, likely artifact. Patent portal vein is seen on same-day CT.      CT-ABDOMEN-PELVIS WITH   Final Result         No significant interval change.      1. Numerous hepatic metastases, similar to prior.   2. Grossly unchanged ill-defined pancreatic tail mass.   3. Moderate abdominopelvic ascites, similar to prior      DX-PORTABLE FLUOROSCOPY < 1 HOUR    (Results Pending)         Assessment/Plan  66 y/o with pancreas tail cancer and liver metastatic disease, possible prior CBD stone on EUS, that presented for abdominal pain and elevated liver tests, obstructive pattern.  Imaging not suggestive of biliary obstruction given normal CBD size.  MRCP demonstrates small distal cbd stone ERCP  by Dr. Aldrich with removal. Bilirubin remains elevated.     PROBLEMS:  Epigastric and RUQ pain-  Elevated liver enzymes, alkaline phosphatase, bilirubin  Leukocytosis  Ascites, suspect malignant  Pancreas tail cancer with progressive liver mets  Choledocholithiasis status post ERCP 10/23/2022    PLAN:  Low-fat diet today  Trend liver enzymes, bilirubin.  If bilirubin has not improved tomorrow, suspect that her ongoing pain and elevation of her bilirubin more likely secondary to her metastatic liver disease.  This was discussed with the patient and her  at bedside.  Depending on results tomorrow, may need to have further discussion with Dr. Aldrich as well as potentially interventional radiology.

## 2022-10-24 NOTE — PROGRESS NOTES
Report received from tameka Nicholson sleeping during report.     came in about 0830 and assisted her to the bathroom and she sat up on the edge of the bed.  Morning assessment done about 0850; pt asking where her breakfast tray was she was hungry.  Followed up with kitchen and tray was left outside her room. She also drinks boost at home; will f/u with doctor when rounding.  POC discussed and she was hoping to go home today.  Went to reheat her tray and came back to hospitalist and GI rounding together discussing her procedure yesterday and imaging; plan for to recheck her labs in the morning for bilirubin.      Pt ate her breakfast and then has been resting most of the day; did change her linens when to she got up to void.    has been at her bedside all day.

## 2022-10-24 NOTE — DISCHARGE PLANNING
Case Management Discharge Planning    Admission Date: 10/21/2022  GMLOS: 3.5  ALOS: 3    6-Clicks ADL Score: 21  6-Clicks Mobility Score: 18      Anticipated Discharge Dispo:  Home with spouse    DME Needed: No    Action(s) Taken: Discussed pt in IDT rounds. Per MD, GI is following. Pt has 6-clicks score of 24. No d/c needs identified or reported at this time.    Escalations Completed: None    Medically Clear: No    Next Steps: LSW to follow and assist as needed.    Barriers to Discharge: None    Is the patient up for discharge tomorrow: No

## 2022-10-24 NOTE — THERAPY
Missed Therapy     Patient Name: Ella Kent  Age:  67 y.o., Sex:  female  Medical Record #: [id number]  Today's Date: 10/24/2022    Discussed missed therapy with Nursing       10/24/22 4469   Initial Contact Note    Initial Contact Note Order Received and Verified, Occupational Therapy Evaluation in Progress with Full Report to Follow.   Interdisciplinary Plan of Care Collaboration   IDT Collaboration with  Certified Nursing Assistant;Family / Caregiver;Nursing   Collaboration Comments Patient reported fatigue and requested OT eval at another time.. Will attempt later.

## 2022-10-24 NOTE — CONSULTS
"Reason for Palliative Care Consult: Advance Care Planning (ACP)    Consulted by: Kathi Downs D.O.    HPI: Ella Kent is a 67 y.o. female, with a past medical history of pancreatic cancer metastatic to the liver (diagnosed 05/2022), s/p 5 cycles chemoterapy (most recent 9/23/2022), who was admitted 10/21/2022 with abdominal pain for 3 days, in combination with worsening abdominal distention, jaundice, and progressive weakness.  Lactate elevated.  CT abdomen/pelvis showed no significant interval change, with numerous hepatic metastasis similar to prior, and grossly unchanged ill-defined pancreatic tail mass.  Patient admitted for sepsis and started on IV fluids and Zosyn for possible cholangitis.  GI was consulted. Patient underwent MRCP 10/22, which showed 3 mm distal CBD stones. She underwent ERCP sphincterotomy with balloon sweep 10/23 (Dr. Aldrich).    Past medical/surgical history:   Past Medical History:   Diagnosis Date    Cancer (HCC)     Pancreatic with mets to liver    Pain     LBP after pancreas issue began     Additional consults:   Gastroenterology  Hospital Medicine    Assessment:  Neuro: Patient alert & oriented X4  Dyspnea: No-    Last BM: 10/22/22-    Pain: No-    Depression: No-    Dementia: No;       Living situation & psychosocial: Patient is , works full time, and lives with her  Papa in [place], [place].  They have been  for 43 years.  They have 3 children and 6 grandchildren.  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  Patient reports she has very good family, friend, and Taoist support.  She indicates her daughters-in-law, Marry and Leyda, come up every week to help her with meals, house cleaning, etc.    Ella derives divina from spending time with her family.    Spiritual:  Is Catholic or spirituality important for coping with this illness? Yes- \"Amish: Quaker\" per face sheet.  Patient declined offer for chaplaincy visit, " "stating her own  will be visiting her in hospital.  Patient reports she has \"a [place] of [place] that has been praying for my since I was diagnosed.\"    Has a  or spiritual provider visit been requested? Yes    Palliative Performance Scale: 50%    Advance Directive: None-    DPOA: No-    POLST: No-      To locate and/or review the AD/POLST, please hover the cursor over the patient's code status, then scroll down under Documents to access ACP document links.    Code Status: Full-      Discussion:  This APRN and Per Melvin MS4 met with patient,  Papa, and friend Tatum at bedside. Introduced myself and explained my role in Palliative Care (PC). From a functional standpoint, prior to admission, patient was able to get around on her own, make her coffee/meals, shower independently, etc.  She reports she has been on leave with her job since May 2022, when she was diagnosed with pancreatic cancer.    When queried about her understanding of her current health situation, Ella affirms she was diagnosed via biopsy with pancreatic cancer in May.  She indicates she has had 5 infusions of chemotherapy.  Papa said, \"They may have been hitting her a little hard with the aggressive chemo, which is a nightmare.  But the pancreasx actually responded well to the chemo.  The mass has come down in size a little bit.\"  When asked what brought her to the hospital Ella said, \"We came down and got the news of the blockage.  The bilirubin hasn't come down like they think it should, so they're thinking something else may be going on, but they don't know what yet.\"     Discussed goals of care (GOC). When asked how she is wanting to proceed with her medical care, Ella said, \"I go by what my doctor says -- Dr. Leggett.\"  Papa shared when they first met with Dr. Leggett, \"He told us we could let it proceed with its course, or continue to fight.\"  Ella verbalized she wishes to take it a day-at-a-time.  Her hope is that " "her bilirubin will come down, so she can continue to receive chemo.  Papa said, \"If the bilirubin doesn't come down, they can't continue with the chemo.\"  For now, she wishes to continue aggressive disease-modifying treatment.  Her most important goal \"is to go home.\"      Attempted to discuss Hospice as a treatment option that patient may wish to employ down the road.  Patient politely declined offer to discuss hospice at this time stating, \"That's waaaay in the future.  I'm not ready for that yet.\"     Discussed Advance Directive (AD) and educated patient/spouse about purpose of form.  Patient declined offer to complete AD today.  This APRN educated her that if no AD in place, per [facility], Papa would be her default healthcare surrogate.    Discussed code status and educated patient about term.  Described measures employed in a full code including defibrillation, CPR, and intubation.  Patient affirmed she wishes to remain Full Code at this time.     Acknowledged these are really hard things to think about and talk about.  Advised me and my team are here to support her and her family during this difficult time.  Provided Ella with Palliative Care contact information, and encouraged her to call with any questions/needs, or if she just needs to talk.  All questions answered.    Provided therapeutic communication including open-ended questions, therapeutic silence, reflective listening, normalization of feelings, and empathic support throughout encounter.      Outcome:  Patient wishing to continue pursuing aggressive disease-modifying treatment at this time.  Declined offer to discuss hospice, as patient feels it is premature.  Declined offer for AD completion.    Plan: Palliative care to continue to follow, provide support, and help facilitate decision making as clinical picture evolves.    Updated: Dr. Alexander via Vo[employer].    Thank you for allowing Palliative Care to participate in Ella's care. Please call " our team with questions and/or additional needs.    As appropriate, Palliative Care encourages medical team to engage in further conversation, education for patient/family at bedside regarding code status, GOC/POC.    Total visit time was 35 minutes discussing and coordinating advance care planning.     Anali Arora, DNP, APRN, [facility]  Palliative Care Nurse Practitioner  [phone]

## 2022-10-24 NOTE — CARE PLAN
The patient is Stable - Low risk of patient condition declining or worsening    Shift Goals  Clinical Goals: IV ABX, and safety  Patient Goals: sleep  Family Goals: HUYEN    Progress made toward(s) clinical / shift goals:  Patient pain is managed and controlled, IV ABX and IVF, ambulates to the restroom, fall precautions in place, pt is resting and sleeping, call light within reach      Problem: Knowledge Deficit - Standard  Goal: Patient and family/care givers will demonstrate understanding of plan of care, disease process/condition, diagnostic tests and medications  Outcome: Progressing     Problem: Pain - Standard  Goal: Alleviation of pain or a reduction in pain to the patient’s comfort goal  Outcome: Progressing       Patient is not progressing towards the following goals:

## 2022-10-24 NOTE — PROGRESS NOTES
Patient neurologically intact. Hemodynamically stable. Off tele as ordered. Tolerating PO. Ambulating to commode without difficulty. Had ERCP today.  Patient on CLD temporarily and will advance as tolerated.  Patient has decided that she will take it slow and have broth and advance diet tomorrow.  Patient did express some minimal discomfort to RUQ that would come and go and has now subsided without intervention.   Family at bedside and updated. Patient resting comfortably and will continue to monitor.

## 2022-10-24 NOTE — PROGRESS NOTES
12-hour chart check complete.    Monitor Summary  Rhythm: NSR  Rate: 79  Ectopy: 0  Measurements: .16/.08/.34

## 2022-10-24 NOTE — PROGRESS NOTES
Hospital Medicine Daily Progress Note    Date of Service  10/24/2022    Chief Complaint  Hyperbilirubinemia    Hospital Course  lEla Kent is a 67 y.o. female pancreatic cancer metastatic to the liver, on chemotherapy with last chemotherapy infusion on 9/23/2022, admitted 10/21/2022 with abdominal pain for 3 days, with worsening abdominal distention, jaundice, and progressive weakness.  Evaluation showed WBC of 19,000, sodium 127, AST of 167, ALT 56, bilirubin of 9, and negative lipase.  Lactate was elevated.  CT of the abdomen pelvis showed no significant interval change with numerous hepatic metastasis similar to prior, and grossly unchanged ill-defined pancreatic tail mass.  GI was consulted recommended doing MRCP to rule out possible obstruction secondary to small bile duct obstruction from the numerous liver mets versus possible choledocholithiasis.  She is started on Zosyn for possible cholangitis, along with IV fluids for sepsis. MRCP showed 3 mm distal CBD stone.  She had an ERCP this morning, with findings of choledocholithiasis treated with sphincterotomy with balloon sweep.     Interval Problem Update  10/24/2022 - I reviewed the patient's chart. There were no significant overnight events. Remains hemodynamically stable and afebrile. Stable on RA.  WBC 16,800.  Bilirubin remains elevated at 8.8.  Potassium 3.5.  Sodium 133.  ALT now normal.  AST down to 122.  Alkaline phosphatase 698.    > I have personally seen and examined the patient today.  She is comfortable today.  She has very mild abdominal discomfort.  Not nauseated.  No chest pain or shortness of breath.  No fevers.    I have discussed this patient's plan of care and discharge plan at IDT rounds today with Case Management, Nursing, Nursing leadership, and other members of the IDT team.    Consultants/Specialty  GI    Code Status  Full Code    Disposition  Patient is not medically cleared for discharge.   Anticipate discharge to to  home with close outpatient follow-up.  I have placed the appropriate orders for post-discharge needs.    Review of Systems  ROS     Pertinent positives/negatives as mentioned above.     A complete review of systems was personally done by me. All other systems were negative.       Physical Exam  Temp:  [36.3 °C (97.4 °F)-37.2 °C (98.9 °F)] 36.3 °C (97.4 °F)  Pulse:  [72-96] 95  Resp:  [16-19] 18  BP: (118-130)/(71-92) 130/86  SpO2:  [96 %-100 %] 99 %    Physical Exam  Vitals reviewed.   Constitutional:       General: She is not in acute distress.     Appearance: Normal appearance. She is normal weight. She is ill-appearing. She is not diaphoretic.   HENT:      Head: Normocephalic and atraumatic.      Mouth/Throat:      Mouth: Mucous membranes are moist.      Pharynx: No oropharyngeal exudate or posterior oropharyngeal erythema.   Eyes:      General: Scleral icterus present.      Extraocular Movements: Extraocular movements intact.      Conjunctiva/sclera: Conjunctivae normal.      Pupils: Pupils are equal, round, and reactive to light.   Cardiovascular:      Rate and Rhythm: Normal rate and regular rhythm.      Heart sounds: Normal heart sounds. No murmur heard.  Pulmonary:      Effort: Pulmonary effort is normal. No respiratory distress.      Breath sounds: Normal breath sounds. No stridor. No wheezing, rhonchi or rales.   Chest:      Chest wall: No tenderness.   Abdominal:      General: Bowel sounds are normal. There is no distension.      Palpations: Abdomen is soft. There is no mass.      Tenderness: There is no abdominal tenderness. There is no guarding or rebound.   Musculoskeletal:         General: No swelling. Normal range of motion.      Cervical back: Normal range of motion and neck supple. No rigidity. No muscular tenderness.      Right lower leg: Edema present.      Left lower leg: Edema present.   Lymphadenopathy:      Cervical: No cervical adenopathy.   Skin:     General: Skin is warm and dry.       Coloration: Skin is jaundiced.      Findings: No rash.   Neurological:      General: No focal deficit present.      Mental Status: She is alert and oriented to person, place, and time. Mental status is at baseline.      Cranial Nerves: No cranial nerve deficit.   Psychiatric:         Mood and Affect: Mood normal.         Behavior: Behavior normal.         Thought Content: Thought content normal.         Judgment: Judgment normal.     I have performed the physical examination today 10/24/2022.  In review of yesterday's note, there are no new changes except as documented above.      Fluids    Intake/Output Summary (Last 24 hours) at 10/24/2022 1009  Last data filed at 10/24/2022 0830  Gross per 24 hour   Intake 240 ml   Output --   Net 240 ml       Laboratory  Recent Labs     10/22/22  0110 10/23/22  0401 10/24/22  0300   WBC 14.7* 16.2* 16.8*   RBC 3.15* 3.44* 3.25*   HEMOGLOBIN 11.4* 12.6 11.7*   HEMATOCRIT 33.8* 38.2 35.9*   .3* 111.0* 110.5*   MCH 36.2* 36.6* 36.0*   MCHC 33.7 33.0* 32.6*   RDW 86.6* 94.0* 94.6*   PLATELETCT 99* 89* 97*   MPV 10.3 10.4 9.9     Recent Labs     10/22/22  0110 10/23/22  0401 10/24/22  0300   SODIUM 128* 133* 133*   POTASSIUM 3.8 3.4* 3.5*   CHLORIDE 93* 97 99   CO2 22 20 16*   GLUCOSE 108* 92 78   BUN 18 16 18   CREATININE 0.51 0.47* 0.60   CALCIUM 8.8 8.5 8.0*     Recent Labs     10/21/22  1550   INR 1.50*               Imaging  DX-PORTABLE FLUOROSCOPY < 1 HOUR   Preliminary Result      Portable fluoroscopy utilized for 2 seconds.         INTERPRETING LOCATION: [address] [place] [address], [id number]      [id number] [id number]   Final Result      Fluoroscopic image(s) obtained during ERCP. Please see the patient's chart for full procedural details.      Fluoroscopy time less than 1 minute.      Fluoroscopy Dose-Area Product: 3.28 Gy*cm^2.         [id number]   Final Result      1. A 3 mm distal CBD stone. No significant upstream biliary dilatation.   2. Prior cholecystectomy.   3.  Known pancreatic mass, unchanged since recent CT.   4. Innumerable bilobar hepatic metastases.   5. Partially visualized small to moderate ascites.   6. Trace bilateral pleural effusions.         US-RUQ   Final Result      1. Multiple masses throughout the liver are redemonstrated, in keeping with known hepatic metastasis.   2. Moderate abdominal ascites.   3. Poor flow detected in the main portal vein, likely artifact. Patent portal vein is seen on same-day CT.      CT-ABDOMEN-PELVIS WITH   Final Result         No significant interval change.      1. Numerous hepatic metastases, similar to prior.   2. Grossly unchanged ill-defined pancreatic tail mass.   3. Moderate abdominopelvic ascites, similar to prior           Assessment/Plan  * Obstructive jaundice due to cancer (HCC)- (present on admission)  Assessment & Plan  - In setting of known pancreatic cancer metastatic to the liver.  Total bilirubin 9 on admission, with elevated AST and ALT.  CT scan showed no significant interval changes with numerous hepatic metastasis similar to prior, and grossly unchanged ill-defined pancreatic tail mass. RCP showed 3 mm distal CBD stone.    - s/p ERCP 10/23/22, with findings of choledocholithiasis treated with sphincterotomy with balloon sweep.   -Total bilirubin persistently elevated at 8.8 today.  -Continue Zosyn for possible cholangitis.  WBC count remains elevated.  Continue to monitor WBC count, want to see that this trends down.  Watch for fevers.  -Trend LFTs and see if this will improve.  GI following.    Sepsis (HCC)- (present on admission)  Assessment & Plan  -This is Sepsis Present on admission  -SIRS criteria identified on my evaluation include: Tachycardia, with heart rate greater than 90 BPM and Leukocytosis, with WBC greater than 12,000  -Source is abdominal, possible cholangitis.  -Continue sepsis protocol, and fluid resuscitation.  -Continue IV antibiotics as appropriate for source of sepsis.  Continue Zosyn.   Follow cultures.  Trend WBC count.  -Close hemodynamic monitoring.  Has elevated lactate, but hemodynamically stable.      Hyponatremia- (present on admission)  Assessment & Plan  -Likely from hypovolemia, also SIADH from pain and cancer.  -Improved.  Continue hydration with IV fluids, NS at 100 cc/h.  -Trend sodium levels.    Pancreatic carcinoma (HCC)- (present on admission)  Assessment & Plan  -Diagnosed 5/20/22; initiated FOLFIRINOX 7/15/22  -Follow up with Dr. Leggett and Dr. Grey  -Continue pain control with Toradol, and naproxen.  She refuses opioid analgesics.    Thrombocytopenia (HCC)- (present on admission)  Assessment & Plan  -116 on admission, 97 this morning.  -No signs of bleeding.  -Continue to monitor, especially on antibiotics.  CBC in the morning.       VTE prophylaxis: SCDs/TEDs

## 2022-10-24 NOTE — CARE PLAN
The patient is Stable - Low risk of patient condition declining or worsening    Shift Goals  Clinical Goals: IV antibx, Discharge?  Patient Goals: Go home  Family Goals: HUYEN    Progress made toward(s) clinical / shift goals:      Patient is not progressing towards the following goals:  Bilirubin still elevated, continuing IVF and antibx, will reassess in the morning for possible discharge.      Problem: Knowledge Deficit - Standard  Goal: Patient and family/care givers will demonstrate understanding of plan of care, disease process/condition, diagnostic tests and medications  Outcome: Progressing     Problem: Fluid Volume  Goal: Fluid volume balance will be maintained  Outcome: Progressing

## 2022-10-24 NOTE — THERAPY
Missed Therapy     Patient Name: Ella Kent  Age:  67 y.o., Sex:  female  Medical Record #: [id number]  Today's Date: 10/24/2022    Discussed missed therapy with Nursing       10/24/22 1420   Interdisciplinary Plan of Care Collaboration   IDT Collaboration with  Nursing   Collaboration Comments Patient reported fatigue and requested OT eval at another time.

## 2022-10-25 PROBLEM — E87.20 LACTIC ACIDOSIS: Status: ACTIVE | Noted: 2022-01-01

## 2022-10-25 PROBLEM — E87.6 HYPOKALEMIA: Status: ACTIVE | Noted: 2022-01-01

## 2022-10-25 NOTE — CARE PLAN
The patient is Stable - Low risk of patient condition declining or worsening    Shift Goals  Clinical Goals: Discussion with GI, MRI of abdomen, get choices for meals to increase her intake  Patient Goals: Control pain, sleep well  Family Goals: HUYEN    Progress made toward(s) clinical / shift goals:  achieved    Patient is not progressing towards the following goals:      Problem: Physical Regulation  Goal: Diagnostic test results will improve  Outcome: Not Progressing  Goal: Signs and symptoms of infection will decrease  Outcome: Not Progressing       Problem: Pain - Standard  Goal: Alleviation of pain or a reduction in pain to the patient’s comfort goal  Outcome: Progressing     Problem: Knowledge Deficit - Standard  Goal: Patient and family/care givers will demonstrate understanding of plan of care, disease process/condition, diagnostic tests and medications  Outcome: Progressing

## 2022-10-25 NOTE — HOSPITAL COURSE
Ms Kent has past medical history that includes pancreatic cancer,  she has been treated with chemotherapy with last treatment in September 2020.  She was admitted to the hospital on 10/21/2022 for abnormal liver enzymes.  Patient was treated for sepsis and gastroenterology was consulted.  On 10/23/2022 she underwent ERCP with sphincterotomy and balloon sweep.

## 2022-10-25 NOTE — CARE PLAN
The patient is Stable - Low risk of patient condition declining or worsening    Shift Goals  Clinical Goals: Maintain safety, control pain  Patient Goals: Control pain, sleep well  Family Goals: HUYEN    Progress made toward(s) clinical / shift goals:    Problem: Pain - Standard  Goal: Alleviation of pain or a reduction in pain to the patient’s comfort goal  Outcome: Progressing  Note: Patient appropriately verbalizes needs related to pain.      Problem: Knowledge Deficit - Standard  Goal: Patient and family/care givers will demonstrate understanding of plan of care, disease process/condition, diagnostic tests and medications  Outcome: Progressing  Note: Updated patient on plan of care including medications and treatments. Encouraged verbalization of questions and concerns. All concerns have been addressed at this time.        Patient is not progressing towards the following goals:

## 2022-10-25 NOTE — PROGRESS NOTES
[facility] Medicine Daily Progress Note    Date of Service  10/25/2022    Chief Complaint  Ella Kent is a 67 y.o. female admitted 10/21/2022 with abnormal labs.    Hospital Course  Ms Kent has past medical history that includes pancreatic cancer,  she has been treated with chemotherapy with last treatment in September 2020.  She was admitted to the hospital on 10/21/2022 for abnormal liver enzymes.  Patient was treated for sepsis and gastroenterology was consulted.  On 10/23/2022 she underwent ERCP with sphincterotomy and balloon sweep.      Interval Problem Update  Patient seen and examined on medical floor, her  at the bedside  Patient is fatigued, she complains of generalized abdominal pain which she rates 4 out of 10, no nausea, she has no appetite  We discussed the patient's labs and concerning lack of improvement in her bilirubin  They are agreeable to MRCP and would like to discuss further after results are available  I discussed the case with Joaquin MURRY for gastroenterology, repeat ERCP and/or percutaneous intervention are not recommended at this time    I have discussed this patient's plan of care and discharge plan at IDT rounds today with Case Management, Nursing, Nursing leadership, and other members of the IDT team.    Consultants/Specialty  GI    Code Status  Full Code    Disposition  Patient is not medically cleared for discharge.   Anticipate discharge to to home with close outpatient follow-up.  I have placed the appropriate orders for post-discharge needs.    Review of Systems  Review of Systems   Constitutional:  Positive for malaise/fatigue. Negative for chills.   Respiratory:  Negative for cough, hemoptysis and sputum production.    Cardiovascular:  Negative for chest pain, palpitations and orthopnea.   Gastrointestinal:  Positive for abdominal pain. Negative for nausea and vomiting.   Skin:  Negative for itching and rash.   Neurological:  Negative for dizziness.    All other systems reviewed and are negative.     Physical Exam  Temp:  [36.3 °C (97.4 °F)-36.6 °C (97.8 °F)] 36.6 °C (97.8 °F)  Pulse:  [61-95] 61  Resp:  [18] 18  BP: (121-143)/(80-88) 121/84  SpO2:  [90 %-100 %] 90 %    Physical Exam  Constitutional:       General: She is not in acute distress.     Appearance: Normal appearance. She is normal weight.      Comments: Appears fatigued   HENT:      Head: Normocephalic and atraumatic.      Right Ear: External ear normal.      Left Ear: External ear normal.      Nose: Nose normal.      Mouth/Throat:      Mouth: Mucous membranes are moist.      Pharynx: Oropharynx is clear.   Eyes:      General: Scleral icterus present.      Extraocular Movements: Extraocular movements intact.      Conjunctiva/sclera: Conjunctivae normal.      Pupils: Pupils are equal, round, and reactive to light.   Cardiovascular:      Rate and Rhythm: Normal rate and regular rhythm.      Pulses: Normal pulses.   Pulmonary:      Effort: Pulmonary effort is normal. No respiratory distress.      Breath sounds: Normal breath sounds. No wheezing.   Abdominal:      General: Abdomen is flat. Bowel sounds are normal.      Palpations: Abdomen is soft.      Tenderness: There is abdominal tenderness.      Comments: Mild tenderness no rebound or guarding   Musculoskeletal:         General: Normal range of motion.      Cervical back: Normal range of motion and neck supple.   Skin:     General: Skin is warm and dry.      Capillary Refill: Capillary refill takes less than 2 seconds.      Coloration: Skin is jaundiced.   Neurological:      General: No focal deficit present.      Mental Status: She is alert and oriented to person, place, and time.      Cranial Nerves: No cranial nerve deficit.      Gait: Gait normal.   Psychiatric:         Mood and Affect: Mood normal.         Behavior: Behavior normal.       Fluids    Intake/Output Summary (Last 24 hours) at 10/25/2022 1252  Last data filed at 10/25/2022  0830  Gross per 24 hour   Intake 9989.97 ml   Output --   Net 9989.97 ml       Laboratory  Recent Labs     10/23/22  0401 10/24/22  0300 10/25/22  0157   WBC 16.2* 16.8* 21.3*   RBC 3.44* 3.25* 3.34*   HEMOGLOBIN 12.6 11.7* 12.1   HEMATOCRIT 38.2 35.9* 37.3   .0* 110.5* 111.7*   MCH 36.6* 36.0* 36.2*   MCHC 33.0* 32.6* 32.4*   RDW 94.0* 94.6* 94.4*   PLATELETCT 89* 97* 101*   MPV 10.4 9.9 10.5     Recent Labs     10/23/22  0401 10/24/22  0300 10/25/22  0157   SODIUM 133* 133* 133*   POTASSIUM 3.4* 3.5* 3.1*   CHLORIDE 97 99 99   CO2 20 16* 15*   GLUCOSE 92 78 163*   BUN 16 18 24*   CREATININE 0.47* 0.60 0.64   CALCIUM 8.5 8.0* 8.2*                   Imaging  DX-PORTABLE FLUOROSCOPY < 1 HOUR   Final Result      Portable fluoroscopy utilized for 2 seconds.         INTERPRETING LOCATION: [address] [place] [address], [address]      [id number] DUCTS   Final Result      Fluoroscopic image(s) obtained during ERCP. Please see the patient's chart for full procedural details.      Fluoroscopy time less than 1 minute.      Fluoroscopy Dose-Area Product: 3.28 Gy*cm^2.         GT-XHILAMB-M/O   Final Result      1. A 3 mm distal CBD stone. No significant upstream biliary dilatation.   2. Prior cholecystectomy.   3. Known pancreatic mass, unchanged since recent CT.   4. Innumerable bilobar hepatic metastases.   5. Partially visualized small to moderate ascites.   6. Trace bilateral pleural effusions.         US-RUQ   Final Result      1. Multiple masses throughout the liver are redemonstrated, in keeping with known hepatic metastasis.   2. Moderate abdominal ascites.   3. Poor flow detected in the main portal vein, likely artifact. Patent portal vein is seen on same-day CT.      CT-ABDOMEN-PELVIS WITH   Final Result         No significant interval change.      1. Numerous hepatic metastases, similar to prior.   2. Grossly unchanged ill-defined pancreatic tail mass.   3. Moderate abdominopelvic ascites, similar to prior       XY-KAAMFTD-R/O    (Results Pending)        Assessment/Plan  * Obstructive jaundice due to cancer (HCC)- (present on admission)  Assessment & Plan  Pancreatic cancer with known metastasis to the liver  s/p ERCP 10/23/22, with findings of choledocholithiasis treated with sphincterotomy with balloon sweep  Jaundice persists after ERCP  Appreciate GI consult and recommendations, repeat imaging with MRCP  Overall picture is concerning for progression of metastasis  Patient has been would like to discuss after MRI imaging  Continue zosyn to cover cholangitis    Pancreatic carcinoma (HCC)- (present on admission)  Assessment & Plan  Diagnosed 5/20/22; initiated FOLFIRINOX 7/15/22  Followed by Dr. Leggett of oncology.    Lactic acidosis- (present on admission)  Assessment & Plan  Likely related to liver dysfunction  Blood pressure adequate    Hypokalemia- (present on admission)  Assessment & Plan  Replace  I have placed orders for follow labs to assess response to treatment    Hyponatremia- (present on admission)  Assessment & Plan  May be related to effective intravascular depletion    Thrombocytopenia (HCC)- (present on admission)  Assessment & Plan  Need for transfusion, continue to monitor    Sepsis (HCC)- (present on admission)  Assessment & Plan  Sepsis has resolved       VTE prophylaxis: SCDs/TEDs, restart DVT prophylaxis tomorrow and unless invasive procedures planned    I have performed a physical exam and reviewed and updated ROS and Plan today (10/25/2022). In review of yesterday's note (10/24/2022), there are no changes except as documented above.

## 2022-10-25 NOTE — PROGRESS NOTES
Gastroenterology Progress Note    Date of Service  10/25/2022    Referring Physician  Kathi Downs D.O.    Consulting Physician  MANNIE Fuentes    Reason for Consultation  Elevated liver tests    History of Presenting Illness  67 y.o. female with pancreas cancer on chemotherapy, prior cholecystectomy who presented 10/21/2022 with elevated liver tests, RUQ and epigastric abdominal pain.    Patient has known pancreas tail cancer with liver mets which are progressing.  On FOLFOX chemotherapy but given progression of liver disease and rising CA 19-9, plan for switch in therapy.    She notes developing some epigastric abdominal pain and distention with some ashley colored stools 3 days ago.  No nausea, vomiting, fevers, chills.  Appetite normal.    Had labs prior to chemotherapy and has new elevated liver tests, obstructive pattern and mild leukocytosis and told to come to ED.    During prior EUS for pancreas cancer diagnosis 5/2022, she was noted to have distal CBD stone not removed.    INTERVAL HISTORY:    10/22/22: Stable. No acute overnight events. Denies abdominal pain. LFT's trending down. Total bilirubin: 8.0 (9.1 on 10/21/22). AST: 145. ALT: 47. ALP: 677.     MRCP: A 3 mm distal CBD stone. No significant upstream biliary dilatation.  Prior cholecystectomy. Known pancreatic mass, unchanged since recent CT.  Innumerable bilobar hepatic metastases. Partially visualized small to moderate ascites.  Trace bilateral pleural effusions.    10/23/2022: ERCP  Impressions:   Choledocholithiasis treated with ERCP sphincterotomy balloon sweep  Duodenal ulceration/erosion at the D1-D2 transition biopsy with forceps. No active bleeding was seen.  Gastric erythema biopsy    10/24/2022: Patient does have mild upper abdominal pain, ongoing issue per patient and not new.  No nausea or vomiting.  No fevers or chills.  Her AST and ALT came down slightly as well as alkaline phosphatase, however bilirubin still quite elevated  8.8.  No significant improvement from yesterday 8.7.    10/25/2022: Bili trending up to 10 this AM. Patient is very sleepy, only awakens for short time when spoken to. Upper abdomen more tender to touch today.    .    Review of Systems  Review of Systems   Constitutional:  Positive for malaise/fatigue and weight loss.   Respiratory:  Negative for cough and hemoptysis.    Cardiovascular:  Negative for chest pain and palpitations.   Gastrointestinal:  Positive for abdominal pain. Negative for heartburn and nausea.   Skin:  Negative for itching and rash.   Neurological:  Positive for weakness.   All other systems reviewed and are negative.    Past Medical History   has a past medical history of Cancer (HCC) and Pain.    Surgical History   has a past surgical history that includes kamar by laparoscopy (2018); pr upper gi endoscopy,diagnosis (N/A, 5/20/2022); egd w/endoscopic ultrasound (N/A, 5/20/2022); egd with asp/bx (N/A, 5/20/2022); cath placement (Right, 6/30/2022); and pr ercp,diagnostic (N/A, 10/23/2022).    Family History  family history is not on file.    Social History   reports that she has quit smoking. She has never used smokeless tobacco. She reports that she does not currently use alcohol. She reports that she does not use drugs.    Medications    Current Facility-Administered Medications:     heparin lock flush    omeprazole    senna-docusate **AND** polyethylene glycol/lytes **AND** magnesium hydroxide **AND** bisacodyl    NS    acetaminophen    ketorolac    naproxen    ondansetron    ondansetron    [DISCONTINUED] piperacillin-tazobactam **AND** piperacillin-tazobactam      Allergies  Allergies   Allergen Reactions    Codeine Nausea     Nauseous with most narcotics    Hydrocodone Nausea       Physical Exam  Temp:  [36.3 °C (97.4 °F)-36.6 °C (97.8 °F)] 36.6 °C (97.8 °F)  Pulse:  [61-95] 61  Resp:  [18] 18  BP: (121-143)/(80-88) 121/84  SpO2:  [90 %-100 %] 90 %    Physical Exam  Constitutional:        General: She is not in acute distress.     Appearance: She is ill-appearing. She is not toxic-appearing.   HENT:      Head: Normocephalic and atraumatic.      Right Ear: External ear normal.      Left Ear: External ear normal.      Nose: Nose normal.   Eyes:      General: Scleral icterus present.   Cardiovascular:      Rate and Rhythm: Normal rate and regular rhythm.      Pulses: Normal pulses.      Heart sounds: Normal heart sounds.   Pulmonary:      Effort: Pulmonary effort is normal.   Abdominal:      General: Bowel sounds are normal. There is distension.      Palpations: Abdomen is soft. There is no mass.      Tenderness: There is no guarding or rebound.      Hernia: No hernia is present.   Musculoskeletal:      Cervical back: Normal range of motion and neck supple.      Right lower leg: No edema.      Left lower leg: No edema.   Skin:     General: Skin is warm and dry.      Coloration: Skin is jaundiced.   Neurological:      General: No focal deficit present.      Mental Status: She is alert and oriented to person, place, and time.   Psychiatric:         Mood and Affect: Mood normal.         Behavior: Behavior normal.       Fluids      Laboratory  Recent Labs     10/23/22  0401 10/24/22  0300 10/25/22  0157   WBC 16.2* 16.8* 21.3*   RBC 3.44* 3.25* 3.34*   HEMOGLOBIN 12.6 11.7* 12.1   HEMATOCRIT 38.2 35.9* 37.3   .0* 110.5* 111.7*   MCH 36.6* 36.0* 36.2*   MCHC 33.0* 32.6* 32.4*   RDW 94.0* 94.6* 94.4*   PLATELETCT 89* 97* 101*   MPV 10.4 9.9 10.5       Recent Labs     10/23/22  0401 10/24/22  0300 10/25/22  0157   SODIUM 133* 133* 133*   POTASSIUM 3.4* 3.5* 3.1*   CHLORIDE 97 99 99   CO2 20 16* 15*   GLUCOSE 92 78 163*   BUN 16 18 24*   CREATININE 0.47* 0.60 0.64   CALCIUM 8.5 8.0* 8.2*                         Imaging  DX-PORTABLE FLUOROSCOPY < 1 HOUR   Final Result      Portable fluoroscopy utilized for 2 seconds.         INTERPRETING LOCATION: [address] [place] [address], [place], [address]      [id number] DUCTS    Final Result      Fluoroscopic image(s) obtained during ERCP. Please see the patient's chart for full procedural details.      Fluoroscopy time less than 1 minute.      Fluoroscopy Dose-Area Product: 3.28 Gy*cm^2.         [id number]-D/O   Final Result      1. A 3 mm distal CBD stone. No significant upstream biliary dilatation.   2. Prior cholecystectomy.   3. Known pancreatic mass, unchanged since recent CT.   4. Innumerable bilobar hepatic metastases.   5. Partially visualized small to moderate ascites.   6. Trace bilateral pleural effusions.         US-RUQ   Final Result      1. Multiple masses throughout the liver are redemonstrated, in keeping with known hepatic metastasis.   2. Moderate abdominal ascites.   3. Poor flow detected in the main portal vein, likely artifact. Patent portal vein is seen on same-day CT.      CT-ABDOMEN-PELVIS WITH   Final Result         No significant interval change.      1. Numerous hepatic metastases, similar to prior.   2. Grossly unchanged ill-defined pancreatic tail mass.   3. Moderate abdominopelvic ascites, similar to prior      XM-PCNDLVN-V/O    (Results Pending)         Assessment/Plan  66 y/o with pancreas tail cancer and liver metastatic disease, possible prior CBD stone on EUS, that presented for abdominal pain and elevated liver tests, obstructive pattern.  Imaging not suggestive of biliary obstruction given normal CBD size.  MRCP demonstrates small distal cbd stone ERCP  by Dr. Aldrich with removal. Bilirubin remains elevated.     PROBLEMS:  Epigastric and RUQ pain-  Elevated liver enzymes, alkaline phosphatase, bilirubin  Leukocytosis  Ascites, suspect malignant  Pancreas tail cancer with progressive liver mets  Choledocholithiasis status post ERCP 10/23/2022    PLAN:  Discussed patient's case with Dr. Efrain Aldrich as well as Dr. Morales Koehler with interventional radiology at [facility].  Per Dr. Aldrich, no indications for repeat ERCP.  Additionally, per   Hill patient does not have a centralized area of ductal obstruction that is amendable to PTC.  Dr. Aldrich recommended repeat MRCP to be 100% sure that this is from her liver metastatic disease  Due to slightly increased pain today I will also get a lipase level in addition to the MRCP  If MRCP is negative for continued choledocholithiasis or evidence of treatable obstruction, would recommend goals of care discussion and likely palliative/hospice

## 2022-10-25 NOTE — PROGRESS NOTES
"      Spiritual Care Note    Patient Information     Patient's Name: Ella Kent   MRN: [id number]    YOB: 1955   Age and Gender: 67 y.o. female   Service Area: [facility]   [facility]and Bed[facility]   Ethnicity or Nationality:     Primary Language: English   Mandaeism/Spiritual preference: Jewish   Place of Residence: [place]   Family/Friends/Others Present:    Clinical Team Present: Dickson Adamson spiritual care volunteer   Medical Diagnosis(-es)/Procedure(s): Obstructive jaundice due to cancer (HCC)- (present on admission)   Code Status: Full Code    Date of Admission: 10/21/2022   Length of Stay: 4 days        Spiritual Care Provider Information:   MARLA Weathers  [phone]   [email or web address]   Total time : 10 minutes    Spiritual Screen Results:    Gen Nursing  Spiritual Screen  Sources of Hope/Louisa: Companionship, Prayer, Mandaeism/Spiritual community     Palliative Care  PC Mandaeism/Spiritual Screening  Is your spiritual health or inner well-being important to you as you cope with your medical condition?: Yes  Would you like to receive a visit from our Spiritual Care team or your own Alevism or spiritual leader?: Yes  PC Sprituality screening comment: \"Islam: Jewish\" per face sheet.  Patient declined offer for chaplaincy visit, stating her own  will be visiting her in hospital      Encounter/Request Information  Encounter/Request Type   Visited With: Family  Nature of the Visit: Initial, On shift  Continue Visiting:  (Upon request)  General Visit: Yes  Referral From/ Origin of Request: Epic other (Comment) (Palitive care Nurse Practitioner)    Religous Needs/Values       Spiritual Assessment   Spiritual Care Encounters  Observations/Symptoms:  (Patient was sleeping,  trying to accept potential loss)  Interacton/Conversation: Patient was sleeping.   told us that she had extensive pastoral and family prayer.  He " spent time expressing that he was still trying to get his head around losing his .  Assessment: Distress  Distress: Anxiety about the Future, Coping, Grief/Loss/Bereavement, Overwhelmed, Upsetting Diagnosis/Prognosis  Intended Effects: Build Relationship of Care and Support, Demonstrate Caring and Concern, Establish Rapport and Connectedness, Helping Someone Feel Comforted, Journeying With Someone in Grief Process  Interventions: Compassionate presence, reflictive listening, emotional support  Outcomes: Coping, Emotional Release, Progress with Grief  Plan: Visit Upon Request    Notes:

## 2022-10-26 NOTE — FACE TO FACE
Face to Face Supporting Documentation - Home Health    The encounter with this patient was in whole or in part the primary reason for home health admission.    Date of encounter:   Patient:                    MRN:                       YOB: 2022  Ella Kent  [id number]  1955     Home health to see patient for:  Skilled Nursing care for assessment, interventions & education    Skilled need for:  Recent Deterioration of Health Status pancreatic cancer    Skilled nursing interventions to include:  Comment: medication management, labs    Homebound status evidenced by:  Needs the assistance of another person in order to leave the home. Leaving home requires a considerable and taxing effort. There is a normal inability to leave the home.    Community Physician to provide follow up care: Misael Heredia M.D.     Optional Interventions? No      I certify the face to face encounter for this home health care referral meets the CMS requirements and the encounter/clinical assessment with the patient was, in whole, or in part, for the medical condition(s) listed above, which is the primary reason for home health care. Based on my clinical findings: the service(s) are medically necessary, support the need for home health care, and the homebound criteria are met.  I certify that this patient has had a face to face encounter by myself.  Dave Villarreal M.D. - NPI: [id number]

## 2022-10-26 NOTE — CARE PLAN
The patient is Watcher - Medium risk of patient condition declining or worsening    Shift Goals  Clinical Goals: Family meeting with GI at 1430 to discuss POC  Patient Goals: rest, comfort  Family Goals: Understand why she is not getting better    Progress made toward(s) clinical / shift goals:  Meeting set up with family and GI. Patient states her pain has improved now that she has been having BM's. Patient has not required any pain medication. Receiving IV ABX.       Problem: Pain - Standard  Goal: Alleviation of pain or a reduction in pain to the patient’s comfort goal  Outcome: Progressing     Problem: Hemodynamics  Goal: Patient's hemodynamics, fluid balance and neurologic status will be stable or improve  Outcome: Progressing     Problem: Fluid Volume  Goal: Fluid volume balance will be maintained  Outcome: Progressing     Problem: Urinary - Renal Perfusion  Goal: Ability to achieve and maintain adequate renal perfusion and functioning will improve  Outcome: Progressing       Patient is not progressing towards the following goals:

## 2022-10-26 NOTE — DISCHARGE PLANNING
Case Management Discharge Planning    Admission Date: 10/21/2022  GMLOS: 3.5  ALOS: 5    6-Clicks ADL Score: 21  6-Clicks Mobility Score: 18      Anticipated Discharge Dispo: Discharge Disposition: Discharged to home/self care (01)    DME Needed: No    Action(s) Taken: Discussed pt in IDT rounds. Per MD, having a discussion with family around 1430 to discuss prognosis and possible hospice.    Escalations Completed: Provider    Next Steps: LSW to follow up with MD discussion with family and assist with d/c planning.    Barriers to Discharge: POC/GOC discussion

## 2022-10-26 NOTE — PROGRESS NOTES
Gastroenterology Progress Note    Date of Service  10/26/2022    Referring Physician  Kathi Downs D.O.    Consulting Physician  MANNIE Fuentes    Reason for Consultation  Elevated liver tests    History of Presenting Illness  67 y.o. female with pancreas cancer on chemotherapy, prior cholecystectomy who presented 10/21/2022 with elevated liver tests, RUQ and epigastric abdominal pain.    Patient has known pancreas tail cancer with liver mets which are progressing.  On FOLFOX chemotherapy but given progression of liver disease and rising CA 19-9, plan for switch in therapy.    She notes developing some epigastric abdominal pain and distention with some ashley colored stools 3 days ago.  No nausea, vomiting, fevers, chills.  Appetite normal.    Had labs prior to chemotherapy and has new elevated liver tests, obstructive pattern and mild leukocytosis and told to come to ED.    During prior EUS for pancreas cancer diagnosis 5/2022, she was noted to have distal CBD stone not removed.    INTERVAL HISTORY:    10/22/22: Stable. No acute overnight events. Denies abdominal pain. LFT's trending down. Total bilirubin: 8.0 (9.1 on 10/21/22). AST: 145. ALT: 47. ALP: 677.     MRCP: A 3 mm distal CBD stone. No significant upstream biliary dilatation.  Prior cholecystectomy. Known pancreatic mass, unchanged since recent CT.  Innumerable bilobar hepatic metastases. Partially visualized small to moderate ascites.  Trace bilateral pleural effusions.    10/23/2022: ERCP  Impressions:   Choledocholithiasis treated with ERCP sphincterotomy balloon sweep  Duodenal ulceration/erosion at the D1-D2 transition biopsy with forceps. No active bleeding was seen.  Gastric erythema biopsy    10/24/2022: Patient does have mild upper abdominal pain, ongoing issue per patient and not new.  No nausea or vomiting.  No fevers or chills.  Her AST and ALT came down slightly as well as alkaline phosphatase, however bilirubin still quite elevated  8.8.  No significant improvement from yesterday 8.7.    10/25/2022: Bili trending up to 10 this AM. Patient is very sleepy, only awakens for short time when spoken to. Upper abdomen more tender to touch today.     10/26/2022: Patient still quite sleepy this morning but does awaken to voice.  Repeat MRCP negative for any acute changes, and the previous present stone is now gone.  CMP this morning still pending results.  Lipase elevated to 23.   .    Review of Systems  Review of Systems   Constitutional:  Positive for malaise/fatigue and weight loss.   Respiratory:  Negative for cough and hemoptysis.    Cardiovascular:  Negative for chest pain and palpitations.   Gastrointestinal:  Positive for abdominal pain. Negative for heartburn and nausea.   Skin:  Negative for itching and rash.   Neurological:  Positive for weakness.   All other systems reviewed and are negative.    Past Medical History   has a past medical history of Cancer (HCC) and Pain.    Surgical History   has a past surgical history that includes kamar by laparoscopy (2018); pr upper gi endoscopy,diagnosis (N/A, 5/20/2022); egd w/endoscopic ultrasound (N/A, 5/20/2022); egd with asp/bx (N/A, 5/20/2022); cath placement (Right, 6/30/2022); and pr ercp,diagnostic (N/A, 10/23/2022).    Family History  family history is not on file.    Social History   reports that she has quit smoking. She has never used smokeless tobacco. She reports that she does not currently use alcohol. She reports that she does not use drugs.    Medications    Current Facility-Administered Medications:     0.9 % NaCl with KCl 40 mEq 1,000 mL    heparin lock flush    omeprazole    senna-docusate **AND** polyethylene glycol/lytes **AND** magnesium hydroxide **AND** bisacodyl    acetaminophen    ketorolac    naproxen    ondansetron    ondansetron    [DISCONTINUED] piperacillin-tazobactam **AND** piperacillin-tazobactam      Allergies  Allergies   Allergen Reactions    Codeine Nausea      Nauseous with most narcotics    Hydrocodone Nausea       Physical Exam  Temp:  [36.4 °C (97.6 °F)-36.9 °C (98.4 °F)] 36.9 °C (98.4 °F)  Pulse:  [89-97] 93  Resp:  [16-18] 18  BP: (114-124)/(75-92) 118/92  SpO2:  [97 %-100 %] 99 %    Physical Exam  Constitutional:       General: She is not in acute distress.     Appearance: She is ill-appearing. She is not toxic-appearing.   HENT:      Head: Normocephalic and atraumatic.      Right Ear: External ear normal.      Left Ear: External ear normal.      Nose: Nose normal.   Eyes:      General: Scleral icterus present.   Cardiovascular:      Rate and Rhythm: Normal rate and regular rhythm.      Pulses: Normal pulses.      Heart sounds: Normal heart sounds.   Pulmonary:      Effort: Pulmonary effort is normal.   Abdominal:      General: Bowel sounds are normal. There is distension.      Palpations: Abdomen is soft. There is no mass.      Tenderness: There is no guarding or rebound.      Hernia: No hernia is present.   Musculoskeletal:      Cervical back: Normal range of motion and neck supple.      Right lower leg: No edema.      Left lower leg: No edema.   Skin:     General: Skin is warm and dry.      Coloration: Skin is jaundiced.   Neurological:      General: No focal deficit present.      Mental Status: She is alert and oriented to person, place, and time.   Psychiatric:         Mood and Affect: Mood normal.         Behavior: Behavior normal.       Fluids      Laboratory  Recent Labs     10/24/22  0300 10/25/22  0157   WBC 16.8* 21.3*   RBC 3.25* 3.34*   HEMOGLOBIN 11.7* 12.1   HEMATOCRIT 35.9* 37.3   .5* 111.7*   MCH 36.0* 36.2*   MCHC 32.6* 32.4*   RDW 94.6* 94.4*   PLATELETCT 97* 101*   MPV 9.9 10.5       Recent Labs     10/24/22  0300 10/25/22  0157   SODIUM 133* 133*   POTASSIUM 3.5* 3.1*   CHLORIDE 99 99   CO2 16* 15*   GLUCOSE 78 163*   BUN 18 24*   CREATININE 0.60 0.64   CALCIUM 8.0* 8.2*                         Imaging  [id number]   Final Result       1.  No significant biliary dilation.   2.  Distal common bile duct stone is no longer seen.   3.  Extensive hepatic metastatic disease again seen.   4.  Moderate ascites, increased from prior exam.   5.  Body wall edema appears worse as well.      DX-PORTABLE FLUOROSCOPY < 1 HOUR   Final Result      Portable fluoroscopy utilized for 2 seconds.         INTERPRETING LOCATION: [address] [place] [address] A[place] KLEIN, [id number]      [id number] DUCTS   Final Result      Fluoroscopic image(s) obtained during ERCP. Please see the patient's chart for full procedural details.      Fluoroscopy time less than 1 minute.      Fluoroscopy Dose-Area Product: 3.28 Gy*cm^2.         [id number]   Final Result      1. A 3 mm distal CBD stone. No significant upstream biliary dilatation.   2. Prior cholecystectomy.   3. Known pancreatic mass, unchanged since recent CT.   4. Innumerable bilobar hepatic metastases.   5. Partially visualized small to moderate ascites.   6. Trace bilateral pleural effusions.         US-RUQ   Final Result      1. Multiple masses throughout the liver are redemonstrated, in keeping with known hepatic metastasis.   2. Moderate abdominal ascites.   3. Poor flow detected in the main portal vein, likely artifact. Patent portal vein is seen on same-day CT.      CT-ABDOMEN-PELVIS WITH   Final Result         No significant interval change.      1. Numerous hepatic metastases, similar to prior.   2. Grossly unchanged ill-defined pancreatic tail mass.   3. Moderate abdominopelvic ascites, similar to prior            Assessment/Plan  66 y/o with pancreas tail cancer and liver metastatic disease, possible prior CBD stone on EUS, that presented for abdominal pain and elevated liver tests, obstructive pattern.  Imaging not suggestive of biliary obstruction given normal CBD size.  MRCP demonstrates small distal cbd stone ERCP  by Dr. Aldrich with removal. Bilirubin remains elevated.     PROBLEMS:  Epigastric and RUQ pain-  Elevated liver  enzymes, alkaline phosphatase, bilirubin  Leukocytosis  Ascites, suspect malignant  Pancreas tail cancer with progressive liver mets  Choledocholithiasis status post ERCP 10/23/2022    PLAN:  Discussed patient's case with Dr. Efrain Aldrich as well as Dr. Morales Koehler with interventional radiology at [facility].  Per Dr. Aldrich, no indications for repeat ERCP.  Additionally, per Dr. Koehler patient does not have a centralized area of ductal obstruction that is amendable to PTC.  Repeat MRCP as recommended by Dr. Aldrich negative for any acute findings.  2.  No further diagnostic or therapeutic options available from a GI standpoint  3.  Recommend family discussion and goals of care discussion as well as palliative/hospice consultation  4.  Consider involving oncology in decision making process with the patient and her family    GI will sign off but is available at any point if needed

## 2022-10-26 NOTE — CARE PLAN
The patient is Stable - Low risk of patient condition declining or worsening    Shift Goals  Clinical Goals: Family meeting with GI to discuss POC  Patient Goals: Comfort  Family Goals: Understand why she is not getting better    Progress made toward(s) clinical / shift goals:    Problem: Pain - Standard  Goal: Alleviation of pain or a reduction in pain to the patient’s comfort goal  Outcome: Progressing     Problem: Knowledge Deficit - Standard  Goal: Patient and family/care givers will demonstrate understanding of plan of care, disease process/condition, diagnostic tests and medications  Outcome: Progressing     Problem: Fluid Volume  Goal: Fluid volume balance will be maintained  Outcome: Progressing     Problem: Urinary - Renal Perfusion  Goal: Ability to achieve and maintain adequate renal perfusion and functioning will improve  Outcome: Progressing     Problem: Respiratory  Goal: Patient will achieve/maintain optimum respiratory ventilation and gas exchange  Outcome: Progressing     Problem: Fall Risk  Goal: Patient will remain free from falls  Outcome: Progressing     Problem: Skin Integrity  Goal: Skin integrity is maintained or improved  Outcome: Progressing       Patient is not progressing towards the following goals:

## 2022-10-26 NOTE — PROGRESS NOTES
Hospital Medicine Daily Progress Note    Date of Service  10/26/2022    Chief Complaint  Ella Kent is a 67 y.o. female admitted 10/21/2022 with abnormal labs.    Hospital Course  Ms Kent has past medical history that includes pancreatic cancer,  she has been treated with chemotherapy with last treatment in September 2020.  She was admitted to the hospital on 10/21/2022 for abnormal liver enzymes.  Patient was treated for sepsis and gastroenterology was consulted.  On 10/23/2022 she underwent ERCP with sphincterotomy and balloon sweep.      Interval Problem Update  The patient complains of mild abdominal pain today, about the same as yesterday  Taking only small amounts of food, denies nausea  Patient did have a bowel movement today  Discussed goals of care and discharge planning with the patient and her family    Total visit time = 45 minutes; more than 50% spent counseling/coordinating care including I discussion with GI specialist, discussion with oncology specialist, family meeting with discussion of goals of care    I have discussed this patient's plan of care and discharge plan at IDT rounds today with Case Management, Nursing, Nursing leadership, and other members of the IDT team.    Consultants/Specialty  GI    Code Status  Full Code    Disposition  Patient is not medically cleared for discharge.   Anticipate discharge to to home with close outpatient follow-up.  I have placed the appropriate orders for post-discharge needs.    Review of Systems  Review of Systems   Constitutional:  Positive for malaise/fatigue. Negative for chills.   Respiratory:  Negative for cough, hemoptysis and sputum production.    Cardiovascular:  Negative for chest pain, palpitations and orthopnea.   Gastrointestinal:  Positive for abdominal pain. Negative for nausea and vomiting.   Skin:  Negative for itching and rash.   Neurological:  Negative for dizziness.   All other systems reviewed and are negative.      Physical Exam  Temp:  [36.4 °C (97.5 °F)-36.9 °C (98.4 °F)] 36.4 °C (97.5 °F)  Pulse:  [89-97] 90  Resp:  [16-18] 18  BP: (107-124)/(75-92) 107/75  SpO2:  [97 %-100 %] 98 %    Physical Exam  Constitutional:       General: She is not in acute distress.     Appearance: Normal appearance. She is normal weight.      Comments: Appears fatigued   HENT:      Head: Normocephalic and atraumatic.      Right Ear: External ear normal.      Left Ear: External ear normal.      Nose: Nose normal.      Mouth/Throat:      Mouth: Mucous membranes are moist.      Pharynx: Oropharynx is clear.   Eyes:      General: Scleral icterus present.      Extraocular Movements: Extraocular movements intact.      Conjunctiva/sclera: Conjunctivae normal.      Pupils: Pupils are equal, round, and reactive to light.   Cardiovascular:      Rate and Rhythm: Normal rate and regular rhythm.      Pulses: Normal pulses.   Pulmonary:      Effort: Pulmonary effort is normal. No respiratory distress.      Breath sounds: Normal breath sounds. No wheezing.   Abdominal:      General: Abdomen is flat. Bowel sounds are normal.      Palpations: Abdomen is soft.      Tenderness: There is abdominal tenderness.      Comments: Mild tenderness no rebound or guarding   Musculoskeletal:         General: Normal range of motion.      Cervical back: Normal range of motion and neck supple.   Skin:     General: Skin is warm and dry.      Capillary Refill: Capillary refill takes less than 2 seconds.      Coloration: Skin is jaundiced.   Neurological:      General: No focal deficit present.      Mental Status: She is alert and oriented to person, place, and time.      Cranial Nerves: No cranial nerve deficit.      Gait: Gait normal.   Psychiatric:         Mood and Affect: Mood normal.         Behavior: Behavior normal.       Fluids    Intake/Output Summary (Last 24 hours) at 10/26/2022 1623  Last data filed at 10/26/2022 0456  Gross per 24 hour   Intake 1025.04 ml   Output  --   Net 1025.04 ml         Laboratory  Recent Labs     10/24/22  0300 10/25/22  0157 10/26/22  1028   WBC 16.8* 21.3* 24.4*   RBC 3.25* 3.34* 3.45*   HEMOGLOBIN 11.7* 12.1 12.7   HEMATOCRIT 35.9* 37.3 38.1   .5* 111.7* 110.4*   MCH 36.0* 36.2* 36.8*   MCHC 32.6* 32.4* 33.3*   RDW 94.6* 94.4* 93.9*   PLATELETCT 97* 101* 59*   MPV 9.9 10.5 9.9       Recent Labs     10/24/22  0300 10/25/22  0157 10/26/22  1028   SODIUM 133* 133* 130*   POTASSIUM 3.5* 3.1* 3.9   CHLORIDE 99 99 99   CO2 16* 15* 14*   GLUCOSE 78 163* 78   BUN 18 24* 31*   CREATININE 0.60 0.64 0.95   CALCIUM 8.0* 8.2* 8.2*                     Imaging  US-EXTREMITY VENOUS LOWER BILAT   Final Result      [id number]   Final Result      1.  No significant biliary dilation.   2.  Distal common bile duct stone is no longer seen.   3.  Extensive hepatic metastatic disease again seen.   4.  Moderate ascites, increased from prior exam.   5.  Body wall edema appears worse as well.      DX-PORTABLE FLUOROSCOPY < 1 HOUR   Final Result      Portable fluoroscopy utilized for 2 seconds.         INTERPRETING LOCATION: [address] [place] [address], [facility]      [id number] DUCTS   Final Result      Fluoroscopic image(s) obtained during ERCP. Please see the patient's chart for full procedural details.      Fluoroscopy time less than 1 minute.      Fluoroscopy Dose-Area Product: 3.28 Gy*cm^2.         [id number]   Final Result      1. A 3 mm distal CBD stone. No significant upstream biliary dilatation.   2. Prior cholecystectomy.   3. Known pancreatic mass, unchanged since recent CT.   4. Innumerable bilobar hepatic metastases.   5. Partially visualized small to moderate ascites.   6. Trace bilateral pleural effusions.         US-RUQ   Final Result      1. Multiple masses throughout the liver are redemonstrated, in keeping with known hepatic metastasis.   2. Moderate abdominal ascites.   3. Poor flow detected in the main portal vein, likely artifact. Patent portal  vein is seen on same-day CT.      CT-ABDOMEN-PELVIS WITH   Final Result         No significant interval change.      1. Numerous hepatic metastases, similar to prior.   2. Grossly unchanged ill-defined pancreatic tail mass.   3. Moderate abdominopelvic ascites, similar to prior             Assessment/Plan  * Obstructive jaundice due to cancer (HCC)- (present on admission)  Assessment & Plan  Pancreatic cancer with known metastasis to the liver  s/p ERCP 10/23/22, with findings of choledocholithiasis treated with sphincterotomy with balloon sweep  Jaundice persists after ERCP  Appreciate GI consult and recommendations, repeat imaging with MRCP  Overall picture is concerning for progression of metastasis    I discussed patient's clinical progress with Dr. Leggett of oncology, Dr. Leggett did point out that the patient has relatively good functional status but that chemotherapy cannot be provided unless her bilirubin is less than 3    Extended family meeting today, I discussed my concerns with the patient and her family about progression of liver metastasis  The patient's primary goal is to return to her home, we discussed a plan involving home antibiotics and referral to home health, she is agreeable to this  The patient does not feel she is ready for hospice, we did discuss that many home health agencies also provide hospice care  Patient wishes to remain full code at this time    Pancreatic carcinoma (HCC)- (present on admission)  Assessment & Plan  Diagnosed 5/20/22; initiated FOLFIRINOX 7/15/22  Followed by Dr. Leggett of oncology.    Lactic acidosis- (present on admission)  Assessment & Plan  Likely related to liver dysfunction  Blood pressure adequate    Hypokalemia- (present on admission)  Assessment & Plan  Replace  I have placed orders for follow labs to assess response to treatment    Hyponatremia- (present on admission)  Assessment & Plan  May be related to effective intravascular depletion    Thrombocytopenia  (HCC)- (present on admission)  Assessment & Plan  Need for transfusion, continue to monitor    Sepsis (HCC)- (present on admission)  Assessment & Plan  Sepsis has resolved       VTE prophylaxis: SCDs/TEDs, restart DVT prophylaxis tomorrow and unless invasive procedures planned    I have performed a physical exam and reviewed and updated ROS and Plan today (10/26/2022). In review of yesterday's note (10/25/2022), there are no changes except as documented above.

## 2022-10-27 PROBLEM — G93.40 ENCEPHALOPATHY ACUTE: Status: ACTIVE | Noted: 2022-01-01

## 2022-10-27 NOTE — FACE TO FACE
Face to Face Note  -  Durable Medical Equipment    Dave Villarreal M.D. - NPI: [id number]  I certify that this patient is under my care and that they had a durable medical equipment(DME)face to face encounter by myself that meets the physician DME face-to-face encounter requirements with this patient on:    Date of encounter:   Patient:                    MRN:                       YOB: 2022  Ella Kent  [id number]  1955     The encounter with the patient was in whole, or in part, for the following medical condition, which is the primary reason for durable medical equipment:  Other - pancreatic cancer    I certify that, based on my findings, the following durable medical equipment is medically necessary:    Beds.    My Clinical findings support the need for the above equipment due to:  Other - Advanced pancreatic cancer with metastsis , the patient is bedbound with altered mental status, she requires a hospital bed for frequent repositioning to provide comfort and prevent pressure injury, options other than a hospital bed would be inadequate

## 2022-10-27 NOTE — CARE PLAN
The patient is Watcher - Medium risk of patient condition declining or worsening    Shift Goals  Clinical Goals: Improve strength, PT/OT, ABX  Patient Goals: rest, comfort  Family Goals: Understand why she is not getting better    Progress made toward(s) clinical / shift goals:  Patient appears very fatigued and lethargic. Denies any pain/ need for pain medication. Receiving IV ABX. CHG bath complete. Patient encouraged to use call light for any needs.       Problem: Pain - Standard  Goal: Alleviation of pain or a reduction in pain to the patient’s comfort goal  Outcome: Progressing     Problem: Knowledge Deficit - Standard  Goal: Patient and family/care givers will demonstrate understanding of plan of care, disease process/condition, diagnostic tests and medications  Outcome: Progressing     Problem: Hemodynamics  Goal: Patient's hemodynamics, fluid balance and neurologic status will be stable or improve  Outcome: Progressing       Patient is not progressing towards the following goals:

## 2022-10-27 NOTE — ASSESSMENT & PLAN NOTE
Patient developed acute encephalopathy on 10/27/2022  Likely toxic/metabolic encephalopathy due to liver metastasis and potentially cholangitis

## 2022-10-27 NOTE — CARE PLAN
The patient is Stable - Low risk of patient condition declining or worsening    Shift Goals  Clinical Goals: PT/OT, ABX.  Patient Goals: Rest, comfort.  Family Goals: Understand why she is not getting better    Progress made toward(s) clinical / shift goals:    Problem: Pain - Standard  Goal: Alleviation of pain or a reduction in pain to the patient’s comfort goal  Outcome: Progressing     Problem: Knowledge Deficit - Standard  Goal: Patient and family/care givers will demonstrate understanding of plan of care, disease process/condition, diagnostic tests and medications  Outcome: Progressing     Problem: Hemodynamics  Goal: Patient's hemodynamics, fluid balance and neurologic status will be stable or improve  Outcome: Progressing     Problem: Physical Regulation  Goal: Diagnostic test results will improve  Outcome: Progressing     Problem: Fall Risk  Goal: Patient will remain free from falls  Outcome: Progressing     Problem: Skin Integrity  Goal: Skin integrity is maintained or improved  Outcome: Progressing       Patient is not progressing towards the following goals:

## 2022-10-27 NOTE — PROGRESS NOTES
[facility] Medicine Daily Progress Note    Date of Service  10/27/2022    Chief Complaint  Ella Kent is a 67 y.o. female admitted 10/21/2022 with abnormal labs.    Hospital Course  Ms Kent has past medical history that includes pancreatic cancer,  she has been treated with chemotherapy with last treatment in September 2020.  She was admitted to the hospital on 10/21/2022 for abnormal liver enzymes.  Patient was treated for sepsis and gastroenterology was consulted.  On 10/23/2022 she underwent ERCP with sphincterotomy and balloon sweep.      Interval Problem Update  Patient has become lethargic and confused, she is minimally verbal, she will answer some simple question but is unable to express her wishes.  She is unable to provide interval history    Patient seen and evaluated, discussed with the patient's .  The patient is no longer able to make medical decisions.  Her  Papa has elected for comfort care    Total visit time = 52 minutes; more than 50% spent counseling/coordinating care including I discussion with GI specialist, discussion with oncology specialist, family meeting with discussion of goals of care    I have discussed this patient's plan of care and discharge plan at IDT rounds today with Case Management, Nursing, Nursing leadership, and other members of the IDT team.    Consultants/Specialty  GI    Code Status  Comfort Care/DNR    Disposition  Patient is not medically cleared for discharge.   Anticipate discharge to to home with close outpatient follow-up.  I have placed the appropriate orders for post-discharge needs.    Review of Systems  Review of Systems   Constitutional:  Positive for malaise/fatigue. Negative for chills.   Respiratory:  Negative for cough, hemoptysis and sputum production.    Cardiovascular:  Negative for chest pain, palpitations and orthopnea.   Gastrointestinal:  Positive for abdominal pain. Negative for nausea and vomiting.   Skin:  Negative for  itching and rash.   Neurological:  Negative for dizziness.   All other systems reviewed and are negative.     Physical Exam  Temp:  [36.3 °C (97.4 °F)-36.5 °C (97.7 °F)] 36.4 °C (97.5 °F)  Pulse:  [] 101  Resp:  [14-18] 14  BP: (113-129)/(76-97) 129/76  SpO2:  [98 %-100 %] 100 %    Physical Exam  Constitutional:       General: She is not in acute distress.     Appearance: Normal appearance. She is normal weight.      Comments: Appears fatigued   HENT:      Head: Normocephalic and atraumatic.      Right Ear: External ear normal.      Left Ear: External ear normal.      Nose: Nose normal.      Mouth/Throat:      Mouth: Mucous membranes are moist.      Pharynx: Oropharynx is clear.   Eyes:      General: Scleral icterus present.      Extraocular Movements: Extraocular movements intact.      Conjunctiva/sclera: Conjunctivae normal.      Pupils: Pupils are equal, round, and reactive to light.   Cardiovascular:      Rate and Rhythm: Normal rate and regular rhythm.      Pulses: Normal pulses.   Pulmonary:      Effort: Pulmonary effort is normal. No respiratory distress.      Breath sounds: Normal breath sounds. No wheezing.   Abdominal:      General: Abdomen is flat. Bowel sounds are normal.      Palpations: Abdomen is soft.      Tenderness: There is abdominal tenderness.      Comments: Mild tenderness no rebound or guarding   Musculoskeletal:         General: Normal range of motion.      Cervical back: Normal range of motion and neck supple.   Skin:     General: Skin is warm and dry.      Capillary Refill: Capillary refill takes less than 2 seconds.      Coloration: Skin is jaundiced.   Neurological:      General: No focal deficit present.      Mental Status: She is alert.      Cranial Nerves: No cranial nerve deficit.      Gait: Gait normal.      Comments: Lethargic, minimally verbal  Moves all extremities normally  Follows simple commands   Psychiatric:         Mood and Affect: Mood normal.         Behavior:  Behavior normal.       Fluids    Intake/Output Summary (Last 24 hours) at 10/27/2022 1212  Last data filed at 10/27/2022 0128  Gross per 24 hour   Intake 327.18 ml   Output --   Net 327.18 ml         Laboratory  Recent Labs     10/25/22  0157 10/26/22  1028 10/27/22  0512   WBC 21.3* 24.4* 21.2*   RBC 3.34* 3.45* 3.46*   HEMOGLOBIN 12.1 12.7 12.8   HEMATOCRIT 37.3 38.1 38.4   .7* 110.4* 111.0*   MCH 36.2* 36.8* 37.0*   MCHC 32.4* 33.3* 33.3*   RDW 94.4* 93.9* 92.8*   PLATELETCT 101* 59* 66*   MPV 10.5 9.9 10.8       Recent Labs     10/25/22  0157 10/26/22  1028 10/27/22  0512   SODIUM 133* 130* 132*   POTASSIUM 3.1* 3.9 4.0   CHLORIDE 99 99 100   CO2 15* 14* 13*   GLUCOSE 163* 78 67   BUN 24* 31* 37*   CREATININE 0.64 0.95 1.19   CALCIUM 8.2* 8.2* 8.2*                     Imaging  US-EXTREMITY VENOUS LOWER BILAT   Final Result      SO-[id number]-A/O   Final Result      1.  No significant biliary dilation.   2.  Distal common bile duct stone is no longer seen.   3.  Extensive hepatic metastatic disease again seen.   4.  Moderate ascites, increased from prior exam.   5.  Body wall edema appears worse as well.      DX-PORTABLE FLUOROSCOPY < 1 HOUR   Final Result      Portable fluoroscopy utilized for 2 seconds.         INTERPRETING LOCATION: [address] [place] [address], [id number]      [id number] DUCTS   Final Result      Fluoroscopic image(s) obtained during ERCP. Please see the patient's chart for full procedural details.      Fluoroscopy time less than 1 minute.      Fluoroscopy Dose-Area Product: 3.28 Gy*cm^2.         TI-EDOIQII-H/O   Final Result      1. A 3 mm distal CBD stone. No significant upstream biliary dilatation.   2. Prior cholecystectomy.   3. Known pancreatic mass, unchanged since recent CT.   4. Innumerable bilobar hepatic metastases.   5. Partially visualized small to moderate ascites.   6. Trace bilateral pleural effusions.         US-RUQ   Final Result      1. Multiple masses throughout the liver are  redemonstrated, in keeping with known hepatic metastasis.   2. Moderate abdominal ascites.   3. Poor flow detected in the main portal vein, likely artifact. Patent portal vein is seen on same-day CT.      CT-ABDOMEN-PELVIS WITH   Final Result         No significant interval change.      1. Numerous hepatic metastases, similar to prior.   2. Grossly unchanged ill-defined pancreatic tail mass.   3. Moderate abdominopelvic ascites, similar to prior             Assessment/Plan  * Obstructive jaundice due to cancer (HCC)- (present on admission)  Assessment & Plan  Pancreatic cancer with known metastasis to the liver  s/p ERCP 10/23/22, with findings of choledocholithiasis treated with sphincterotomy with balloon sweep  Jaundice persists after ERCP  Appreciate GI consult and recommendations, repeat imaging with MRCP  Overall picture is concerning for progression of metastasis    Patient had a major change in clinical status on 10/27/2022.  Patient became lethargic and essentially nonverbal.     After extensive discussion with the patient's , he has elected to transition her to comfort care.  He would still like to pursue hospice in [place] and I have discussed this option with     Pancreatic carcinoma (HCC)- (present on admission)  Assessment & Plan  Diagnosed 5/20/22; initiated FOLFIRINOX 7/15/22  Followed by Dr. Leggett of oncology.    Lactic acidosis- (present on admission)  Assessment & Plan  Likely related to liver dysfunction  Blood pressure adequate    Hypokalemia- (present on admission)  Assessment & Plan  Replaced    Hyponatremia- (present on admission)  Assessment & Plan  Mild hyponatremia    Encephalopathy acute- (present on admission)  Assessment & Plan  Patient developed acute encephalopathy on 10/27/2022  Likely toxic/metabolic encephalopathy due to liver metastasis and potentially cholangitis    Thrombocytopenia (HCC)- (present on admission)  Assessment & Plan  Need for transfusion,  continue to monitor    Sepsis (HCC)- (present on admission)  Assessment & Plan  Sepsis has resolved       VTE prophylaxis: SCDs/TEDs, restart DVT prophylaxis tomorrow and unless invasive procedures planned    I have performed a physical exam and reviewed and updated ROS and Plan today (10/27/2022). In review of yesterday's note (10/26/2022), there are no changes except as documented above.

## 2022-10-27 NOTE — DISCHARGE PLANNING
Received Choice form at 0820  Agency/Facility Name: [facility]   Referral sent per Choice form @ 3832     @[phone]  DPA received a fax from [facility] accepting the pt and requesting and physician progress note from 10/26 and a dc summary. DPA to fax both once made available.  LSW notified    @[phone]  Received Choice form at 1410  Agency/Facility Name: [facility]  Referral sent per Choice form @ 1422     @[phone]  Agency/Facility Name: [facility]  Outcome: Left a voicemail regarding referral status. DPA requested a call back.  LSW notified    @[phone]  Agency/Facility Name: [facility]  Spoke To: James  Outcome: Per James, their hospital beds are out of stock at this time and he doesn't know when they'll be receiving more.

## 2022-10-27 NOTE — FACE TO FACE
Face to Face Note  -  Durable Medical Equipment    Dave Villarreal M.D. - NPI: [id number]  I certify that this patient is under my care and that they had a durable medical equipment(DME)face to face encounter by myself that meets the physician DME face-to-face encounter requirements with this patient on:    Date of encounter:   Patient:                    MRN:                       YOB: 2022  Ella Kent  [id number]  1955     The encounter with the patient was in whole, or in part, for the following medical condition, which is the primary reason for durable medical equipment:  Other - pancreatic cancer    I certify that, based on my findings, the following durable medical equipment is medically necessary:    Walkers.    My Clinical findings support the need for the above equipment due to:  Abnormal Gait

## 2022-10-27 NOTE — DISCHARGE PLANNING
Case Management Discharge Planning    Admission Date: 10/21/2022  GMLOS: 3.5  ALOS: 6    6-Clicks ADL Score: 21  6-Clicks Mobility Score: 18      Anticipated Discharge Dispo: Discharge Disposition: Home with HH    DME Needed: No    Action(s) Taken: LSW completed chart review. Per review, MD placed HH face to face note but no order. Discussed pt in morning rounds. Per MD, pt and family would like to d/c home with HH. Per MD, they would only like the HH associated with [facility]. LSW requested HH order from MD.     Per chart review, family lives in [place], only serviced by [facility]. HH order placed by MD. LSW faxed HH choice form to DPA per verbal and continuity of care.    1040: Discussed pt in IDT rounds. Per MD, pt more lethargic today. Per MD, will have another discussion with family.    1140: LSW notified by DPA that [facility] accepted. LSW notified bedside RN and MD.    1200: LSW notified by MD that pt is now comfort care and hospice order is placed. LSW called [facility] and spoke to Rossy. Per Rossy, they have hospice volunteers that can see pt. Rossy requested documentation of hospice/comfort care. LSW notified MD.    1215: Per MD,  concerned about hospital bed and transport. Per MD, plan to possibly d/c tomorrow.    1226: LSW faxed hospice referral and MD progress note reflecting comfort care to [facility].     1335: LSW spoke with Rossy at [facility]. Per Rossy, it is best if we order bed and have it delivered to pt's home prior to d/c. Per Rossy, she will have HH RN come out on Monday. Per Rossy, hospice volunteer would also likely come out on Monday. Per Rossy, HH handles the medications, but if pt needs pain medications, will need to order those from here or PCP. LSW updated MD and RN. Per MD, will order hospital bed and pain medications.    1445: LSW met with pt's spouse and family at bedside. LSW notified family that HH and hospice volunteer would be out to see pt on Monday morning.  Spouse gave consent to send DME referral for hospital bed to [facility]. Spouse also requested walker. Spouse states he would like to transport pt tomorrow. Spouse provided physical address: [address] And phone [phone].    1400: LSW notified MD that spouse requested a walker.    1410: Walker order placed, LSW faxed DME choice form to DPA, orders for hospital bed and walker.    Escalations Completed: None    Medically Clear: Yes    Next Steps: LSW to follow up with HH referral.    Barriers to Discharge: None    Is the patient up for discharge tomorrow: No, today

## 2022-10-28 PROBLEM — A41.9 SEPSIS (HCC): Status: RESOLVED | Noted: 2022-01-01 | Resolved: 2022-01-01

## 2022-10-28 PROBLEM — E87.1 HYPONATREMIA: Status: RESOLVED | Noted: 2022-01-01 | Resolved: 2022-01-01

## 2022-10-28 PROBLEM — E87.6 HYPOKALEMIA: Status: RESOLVED | Noted: 2022-01-01 | Resolved: 2022-01-01

## 2022-10-28 NOTE — DISCHARGE INSTRUCTIONS
Discharge Instructions    Discharged to home by medical transportation with escort. Discharged via ambulance, hospital escort: Yes.  Special equipment needed: Not Applicable    Be sure to schedule a follow-up appointment with your primary care doctor or any specialists as instructed.     Discharge Plan:   Diet Plan: Discussed  Activity Level: Discussed  Confirmed Follow up Appointment: Patient to Call and Schedule Appointment  Confirmed Symptoms Management: Discussed  Medication Reconciliation Updated: Yes  Influenza Vaccine Indication: Patient Refuses    I understand that a diet low in cholesterol, fat, and sodium is recommended for good health. Unless I have been given specific instructions below for another diet, I accept this instruction as my diet prescription.   Other diet: Regular    Special Instructions: None    -Is this patient being discharged with medication to prevent blood clots?  No    Is patient discharged on Warfarin / Coumadin?   No       Hospice  Hospice is a service that is designed to provide people who are terminally ill and their families with medical, spiritual, and psychological support. Its aim is to improve your quality of life by keeping you as comfortable as possible in the final stages of life.  Who will be my providers when I begin hospice care?  Hospice teams often include:  A nurse.  A doctor. The hospice doctor will be available for your care, but you can include your regular doctor or nurse practitioner.  A .  A counselor.  A  (such as a ).  A dietitian.  Therapists.  Trained volunteers who can help with care.  What services does hospice provide?  Hospice services can vary depending on the center or organization. Generally, they include:  Ways to keep you comfortable, such as:  Providing care in your home or in a home-like setting.  Working with your family and friends to help meet your needs.  Allowing you to enjoy the support of loved ones by  receiving much of your basic care from family and friends.  Pain relief and symptom management. The staff will supply all necessary medicines and equipment so that you can stay comfortable and alert enough to enjoy the company of your friends and family.  Visits or care from a nurse and doctor. This may include 24-hour on-call services.  Companionship when you are alone.  Allowing you and your family to rest. Hospice staff may do light housekeeping, prepare meals, and run errands.  Counseling. They will make sure your emotional, spiritual, and social needs are being met, as well as those needs of your family members.  Spiritual care. This will be individualized to meet your needs and your family's needs. It may involve:  Helping you and your family understand the dying process.  Helping you say goodbye to your family and friends.  Performing a specific Mu-ism ceremony or ritual.  Massage.  Nutrition therapy.  Physical and occupational therapy.  Short-term inpatient care, if something cannot be managed in the home.  Art or music therapy.  Bereavement support for grieving family members.  When should hospice care begin?  Most people who use hospice are believed to have less than 6 months to live.  Your family and health care providers can help you decide when hospice services should begin.  If you live longer than 6 months but your condition does not improve, your doctor may be able to approve you for continued hospice care.  If your condition improves, you may discontinue the program.  What should I consider before selecting a program?  Most hospice programs are run by nonprofit, independent organizations. Some are affiliated with hospitals, nursing homes, or home health care agencies. Hospice programs can take place in your home or at a hospice center, hospital, or skilled nursing facility. When choosing a hospice program, ask the following questions:  What services are available to me?  What services will be  offered to my loved ones?  How involved will my loved ones be?  How involved will my health care provider be?  Who makes up the hospice care team? How are they trained or screened?  How will my pain and symptoms be managed?  If my circumstances change, can the services be provided in a different setting, such as my home or in the hospital?  Is the program reviewed and licensed by the state or certified in some other way?  What does it cost? Is it covered by insurance?  If I choose a hospice center or nursing home, where is the hospice center located? Is it convenient for family and friends?  If I choose a hospice center or nursing home, can my family and friends visit any time?  Will you provide emotional and spiritual support?  Who can my family call with questions?  Where can I learn more about hospice?  You can learn about existing hospice programs in your area from your health care providers. You can also read more about hospice online. The websites of the following organizations have helpful information:  National Hospice and Palliative Care Organization (NHPCO): www.nhpco.org  National Association for Home Care & Hospice (NAHC): www.nahc.org  Hospice Foundation of [place] (HFA): www.hospicefoundation.org  American Cancer Society (ACS): www.cancer.org  Hospice Net: www.hospicenet.org  Visiting Nurse Associations of [place] (VNAA): www.vnaa.org  You may also find more information by contacting the following agencies:  A local agency on aging.  Your local [facility] chapter.  Your state's department of health or .  Summary  Hospice is a service that is designed to provide people who are terminally ill and their families with medical, spiritual, and psychological support.  Hospice aims to improve your quality of life by keeping you as comfortable as possible in the final stages of life.  Hospice teams often include a doctor, nurse, , counselor, ,dietitian, therapists,  and volunteers.  Hospice care generally includes medicine for symptom management, visits from doctors and nurses, physical and occupational therapy, nutrition counseling, spiritual and emotional counseling, caregiver support, and bereavement support for grieving family members.  Hospice programs can take place in your home or at a hospice center, hospital, or skilled nursing facility.  This information is not intended to replace advice given to you by your health care provider. Make sure you discuss any questions you have with your health care provider.  Document Released: 04/05/2005 Document Revised: 11/30/2018 Document Reviewed: 01/09/2018  Elsevier Patient Education © 2020 Elsevier Inc.

## 2022-10-28 NOTE — CARE PLAN
The patient is Unstable - High likelihood or risk of patient condition declining or worsening    Patient is on comfort care.      Shift Goals  Clinical Goals: PT/OT, ABT  Patient Goals: Rest, comfort  Family Goals: HUYEN    Progress made toward(s) clinical / shift goals:    Problem: Pain - Standard  Goal: Alleviation of pain or a reduction in pain to the patient’s comfort goal  Outcome: Not Met     Problem: Knowledge Deficit - Standard  Goal: Patient and family/care givers will demonstrate understanding of plan of care, disease process/condition, diagnostic tests and medications  Outcome: Not Met     Problem: Hemodynamics  Goal: Patient's hemodynamics, fluid balance and neurologic status will be stable or improve  Outcome: Not Met     Problem: Fluid Volume  Goal: Fluid volume balance will be maintained  Outcome: Not Met       Patient is not progressing towards the following goals:      Problem: Pain - Standard  Goal: Alleviation of pain or a reduction in pain to the patient’s comfort goal  Outcome: Not Met     Problem: Knowledge Deficit - Standard  Goal: Patient and family/care givers will demonstrate understanding of plan of care, disease process/condition, diagnostic tests and medications  Outcome: Not Met     Problem: Hemodynamics  Goal: Patient's hemodynamics, fluid balance and neurologic status will be stable or improve  Outcome: Not Met     Problem: Fluid Volume  Goal: Fluid volume balance will be maintained  Outcome: Not Met

## 2022-10-28 NOTE — PROGRESS NOTES
Received report from DEMETRIA Mann. Patient care assumed. Patient sleeping with even breathing.  sleeping at bedside.     0129 clarified lab orders to be drawn for CMP and CBC with differential with Dr. Le. Due to patient's comfort care status, will hold 0300 blood draw per Dr. Le.

## 2022-10-28 NOTE — DISCHARGE SUMMARY
Discharge Summary    CHIEF COMPLAINT ON ADMISSION  Chief Complaint   Patient presents with    Abnormal Labs     Pt was suppose to get a chemo infusion this am Labs revealed high bilirubin and pt referred to ED for admit and possibly a biliary stent  Hx pancreatic CA with mets to liver       Reason for Admission  sent by MD, lower back pain     Admission Date  10/21/2022    CODE STATUS  Comfort Care/DNR    HPI & HOSPITAL COURSE  This is a 67 y.o. female here with weakness and abnormal labs.    Ms Kent has past medical history that includes pancreatic cancer,  she has been treated with chemotherapy with last treatment in September 2022.  She was admitted to the hospital on 10/21/2022 for choledocholithiasis and cholangitis .  Patient was treated for sepsis and gastroenterology was consulted.  On 10/23/2022 she underwent ERCP with sphincterotomy and balloon sweep.      Post procedure the patient continued to have elevated white blood cell count and her bilirubin continues to increase.  She underwent further work-up with MRCP.  The follow up MRCP did not show any evidence of ductal dilatation.  It did demonstrate multiple hepatic masses consistent with known metastasis.  Imaging was reviewed with gastroenterology and interventional radiology, no target for intervention was identified and it is suspected that her worsening jaundice is due to progression of her malignancy.  Treat with antibiotics continued.    On 10/27/2022 the patient was noted to have a change in her mental status.  She was significantly more confused and lethargic.  Patient was unable to make medical decisions.  After further discussion with the patient's , he elected to transition the patient to comfort care.  Patient was referred to hospice and will be discharged home to the care of hospice today.    Therefore, she is discharged in guarded and stable condition to hospice.    The patient met 2-midnight criteria for an inpatient stay at the  time of discharge.    Discharge Date  10/28/2022    FOLLOW UP ITEMS POST DISCHARGE  Patient will be discharged to hospice    DISCHARGE DIAGNOSES  Principal Problem:    Obstructive jaundice due to cancer (HCC) POA: Yes  Active Problems:    Pancreatic carcinoma (HCC) POA: Yes    Lactic acidosis POA: Yes    Encephalopathy acute POA: Yes    Thrombocytopenia (HCC) POA: Yes  Resolved Problems:    Hyponatremia POA: Yes    Hypokalemia POA: Yes    Sepsis (HCC) POA: Yes      FOLLOW UP  Future Appointments   Date Time Provider Department Center   11/18/2022  1:45 PM [facility] [address]   11/25/2022 10:15 AM [facility] [address]   12/2/2022 10:15 AM [facility] [address]   12/16/2022 10:45 AM [facility] [address]   12/23/2022 10:45 AM [facility] [address]   12/30/2022 10:15 AM [facility] [address]     No follow-up provider specified.    MEDICATIONS ON DISCHARGE     Medication List        STOP taking these medications      Aleve 220 MG tablet  Generic drug: naproxen              Allergies  Allergies   Allergen Reactions    Codeine Nausea     Nauseous with most narcotics    Hydrocodone Nausea       DIET  Orders Placed This Encounter   Procedures    Diet Order Diet: Regular     Standing Status:   Standing     Number of Occurrences:   1     Order Specific Question:   Diet:     Answer:   Regular [1]       ACTIVITY  As tolerated.  Weight bearing as tolerated    CONSULTATIONS  Gastroenterology    PROCEDURES    CT ABDOMEN AND PELVIS 10/21/2022   1. Numerous hepatic metastases, similar to prior.  2. Grossly unchanged ill-defined pancreatic tail mass.  3. Moderate abdominopelvic ascites, similar to prior    10/22/2022 MRI ABDOMEN:  1. A 3 mm distal CBD stone. No significant upstream biliary dilatation.  2. Prior cholecystectomy.  3. Known pancreatic mass, unchanged since recent CT.  4. Innumerable bilobar hepatic metastases.  5. Partially visualized small  to moderate ascites.  6. Trace bilateral pleural effusions.    ERCP 10/23/2022  Impressions:   Choledocholithiasis treated with ERCP sphincterotomy balloon sweep  Duodenal ulceration/erosion at the D1-D2 transition biopsy with forceps. No active bleeding was seen.  Gastric erythema biopsy  Recommendations:   Monitor for postprocedure complication including perforation bleeding pancreatitis  Trend LFTs  Await biopsy result  Start PPI therapy for at least 4 weeks  Ideally avoid anticoagulation for 3 days to avoid sphincterotomy bleeding however if necessary may resume immediately if benefit outweighs risk  6. Ice chips/clear liquid diet immediately; if pain stop and NPO. If no worsening pain in 4 hours can advance as tolerated.    10/25/2022 MRI ABDOMEN  IMPRESSION:   1.  No significant biliary dilation.  2.  Distal common bile duct stone is no longer seen.  3.  Extensive hepatic metastatic disease again seen.  4.  Moderate ascites, increased from prior exam.  5.  Body wall edema appears worse as well.    LABORATORY  Lab Results   Component Value Date    SODIUM 132 (L) 10/27/2022    POTASSIUM 4.0 10/27/2022    CHLORIDE 100 10/27/2022    CO2 13 (L) 10/27/2022    GLUCOSE 67 10/27/2022    BUN 37 (H) 10/27/2022    CREATININE 1.19 10/27/2022        Lab Results   Component Value Date    WBC 21.2 (H) 10/27/2022    HEMOGLOBIN 12.8 10/27/2022    HEMATOCRIT 38.4 10/27/2022    PLATELETCT 66 (L) 10/27/2022        Total time of the discharge process exceeds 50 minutes.

## 2022-10-28 NOTE — CARE PLAN
The patient is Unstable - High likelihood or risk of patient condition declining or worsening    Shift Goals  Clinical Goals: comfort, D/C with hospice  Patient Goals: Rest, comfort  Family Goals: Comfort    Progress made toward(s) clinical / shift goals:      Pt planned for discharge today. Pt to go home with hospice, education provided  and reminded to family as to what to expect once pt is home. Pt is being kept comfortable per family requests and needs.   Medication not given due to pt family member declining. Repositioning done as a non-pharmacological intervention.     Problem: Pain - Standard  Goal: Alleviation of pain or a reduction in pain to the patient’s comfort goal  Outcome: Progressing     Problem: Knowledge Deficit - Standard  Goal: Patient and family/care givers will demonstrate understanding of plan of care, disease process/condition, diagnostic tests and medications  Outcome: Progressing     Problem: Hemodynamics  Goal: Patient's hemodynamics, fluid balance and neurologic status will be stable or improve  Outcome: Progressing     Problem: Fluid Volume  Goal: Fluid volume balance will be maintained  Outcome: Progressing     Problem: Urinary - Renal Perfusion  Goal: Ability to achieve and maintain adequate renal perfusion and functioning will improve  Outcome: Progressing     Problem: Respiratory  Goal: Patient will achieve/maintain optimum respiratory ventilation and gas exchange  Outcome: Progressing     Problem: Mechanical Ventilation  Goal: Safe management of artificial airway and ventilation  Outcome: Progressing  Goal: Successful weaning off mechanical ventilator, spontaneously maintains adequate gas exchange  Outcome: Progressing  Goal: Patient will be able to express needs and understand communication  Outcome: Progressing     Problem: Skin Integrity  Goal: Skin integrity is maintained or improved  Outcome: Progressing       Patient is not progressing towards the following goals:

## 2022-10-28 NOTE — DISCHARGE PLANNING
Case Management Discharge Planning    Admission Date: 10/21/2022  GMLOS: 3.5  ALOS: 7    6-Clicks ADL Score: 21  6-Clicks Mobility Score: 18      Anticipated Discharge Dispo: Discharge Disposition: Home with hospice    DME Needed: No, hospice will provide DME    Action(s) Taken: Discussed pt in morning rounds. Per MD, will meet with family and see pt at bedside to let this LSW know if pt is stable to transfer.    LSW notified by MD that family would like pt to stay local in [place] with local hospice agency, they can provide 24/7 care. LSW to collect choice.    0850: LSW met with spouse Papa at bedside. Papa states he would like to have pt transferred to his son's house in [place]. Papa provided address: [address] [place] [address] [place] [address] (2 steps). Papa states [facility] and second choice is [facility].     0904: LSW sent Voalte message to Geoff ROGERS with [facility] to see if they take [employer] and go out to [place].     0907: Per Geoff, they do go out to [place], but [facility] does not take [employer]. LSW faxed hospice choice to [facility] for [facility].     0913: LSW received call from [facility]. LSW informed [facility] of new plan to d/c to [place] and cancelled DME order. LSW called [facility], spoke to Rossy. LSW informed Rossy, pt is discharging to [place].    0947: LSW called Pradeep with [facility]  and notified him that we sent referral. Per Pradeep, he will come to hospital and visit pt and spouse.     1000: LSW met with spouse and family at bedside. LSW notified them that [facility] does not contract with [employer]. LSW notified them that Pradeep with [facility] is coming to speak with them.    1120: LSW spoke with Pradeep. Per Pradeep, equipment is ordered. Pradeep requested transport be set up for 1600. LSW to set up transport.    1218: No [facility] available today. LSW faxed transport forms to [facility], requested [facility] 1600.    1300: Per [facility], transport is confirmed for 1600. LSW notified MD and bedside RN  and MD. LSW called and notified Pradeep that transport is confirmed for 1600.    Escalations Completed: Speciality Provider    Medically Clear: Yes    Next Steps: LSW to follow up with [facility]. LSW to coordinate transfer home with hospice.    Barriers to Discharge: None    Is the patient up for discharge tomorrow: No, today.

## 2022-10-28 NOTE — DISCHARGE PLANNING
Received Choice form at 0910  Agency/Facility Name: [facility]  Referral sent per Choice form @ 0968     Agency/Facility Name: [facility]  Spoke To: Helen  Outcome: Per Helen, transport is confirmed for 1600 today.

## 2022-10-29 NOTE — PROGRESS NOTES
Pt not alert, discharge summary given to  and daughter, discharge education provided.  IV removed. All belongings returned. [facility]

## 2024-09-09 NOTE — NON-PROVIDER
The following appointments, labs, and medications have been provided to the patient:  Line: port already placed  ECHO: none on file  On Pro/Neulasta: OnPro ordered  Labs: last set completed 6/29/22  Lab Appointments: done in [facility] on infusion days  Follow up appts: f/u call from RN 7/22/22  Chemo/immunotherapy class: completed 7/15 jhony/ Maddy AUGUSTIN  Nausea medication: zofran/compazine prescribed (e-script sent)  Other treatment specific meds: EMLA for port  Oral chemo follow up: n/a  Pain medication: none ordered this visit  Nurse thomas: Referral placed 7/15/22  Dietician:  Referred per policy  SW: n/a at this time      Additional info/teaching for chemotherapy patients:  1.  Neutropenia reminder card provided to patient      Patient and patient's spouse were provided with drug specific handouts and [facility] side effects sheet. Patient verbalized that questions and concerns regarding treatment have been addressed. Consent to proceed with treatment was reviewed and signed during this visit.    Spent 75 minutes of continuous, non-interrupted, face-to-face patient contact in which greater than 50% of the visit was spent counseling and coordinating of care.   152.4

## (undated) DEVICE — TOWEL STOP TIMEOUT SAFETY FLAG (40EA/CA)

## (undated) DEVICE — GLOVE, LITE (PAIR)

## (undated) DEVICE — CANISTER SUCTION RIGID RED 1500CC (40EA/CA)

## (undated) DEVICE — GUIDEWARE ENDOSCOPIC JAGWIRE REVOLUTION RX 39 SPHINCTEROTOME (1EA)

## (undated) DEVICE — SUCTION INSTRUMENT YANKAUER BULBOUS TIP W/O VENT (50EA/CA)

## (undated) DEVICE — HEAD HOLDER JUNIOR/ADULT

## (undated) DEVICE — MASK WITH FACE SHIELD (25/BX 4BX/CA)

## (undated) DEVICE — BLOCK BITE ENDOSCOPIC 2809 - (100/BX) INTERMEDIATE

## (undated) DEVICE — SPONGE GAUZESTER. 2X2 4-PL - (2/PK 50PK/BX 30BX/CS)

## (undated) DEVICE — PROTECTOR ULNA NERVE - (36PR/CA)

## (undated) DEVICE — SODIUM CHL IRRIGATION 0.9% 1000ML (12EA/CA)

## (undated) DEVICE — TUBE CONNECTING SUCTION - CLEAR PLASTIC STERILE 72 IN (50EA/CA)

## (undated) DEVICE — SYRINGE SAFETY 10 ML 18 GA X 1 1/2 BLUNT LL (100/BX 4BX/CA)

## (undated) DEVICE — KIT  I.V. START (100EA/CA)

## (undated) DEVICE — CHLORAPREP 26 ML APPLICATOR - ORANGE TINT(25/CA)

## (undated) DEVICE — ELECTRODE DUAL RETURN W/ CORD - (50/PK)

## (undated) DEVICE — CATHETER IV SAFETY 22 GA X 1 (50EA/BX)

## (undated) DEVICE — LACTATED RINGERS INJ 1000 ML - (14EA/CA 60CA/PF)

## (undated) DEVICE — ADHESIVE MASTISOL - (48/BX)

## (undated) DEVICE — NEPTUNE 4 PORT MANIFOLD - (20/PK)

## (undated) DEVICE — ACQUIRE 22G FNB NEEDLE

## (undated) DEVICE — SUTURE 3-0 VICRYL PLUS SH - 8X 18 INCH (12/BX)

## (undated) DEVICE — CATHETER IV SAFETY 20 GA X 1-1/4 (50/BX)

## (undated) DEVICE — SENSOR SPO2 ADULT LNCS ADTX (20/BX) ORDER ITEM #19593

## (undated) DEVICE — TUBE SUCTION YANKAUER  1/4 X 6FT (20EA/CA)"

## (undated) DEVICE — SYRINGE SAFETY 3 ML 18 GA X 1 1/2 BLUNT LL (100/BX 8BX/CA)

## (undated) DEVICE — STAPLER SKIN DISP - (6/BX 10BX/CA) VISISTAT

## (undated) DEVICE — GLOVE BIOGEL SZ 7.5 SURGICAL PF LTX - (50PR/BX 4BX/CA)

## (undated) DEVICE — SET EXTENSION WITH 2 PORTS (48EA/CA) ***PART #2C8610 IS A SUBSTITUTE*****

## (undated) DEVICE — SYRINGE 12 CC LUER TIP - (80/BX) OBSOLETE ITEM

## (undated) DEVICE — FORCEP RADIAL JAW 4 STANDARD CAPACITY W/NEEDLE 240CM (40EA/BX)

## (undated) DEVICE — SENSOR OXIMETER ADULT SPO2 RD SET (20EA/BX)

## (undated) DEVICE — KIT ANESTHESIA W/CIRCUIT & 3/LT BAG W/FILTER (20EA/CA)

## (undated) DEVICE — TUBING CLEARLINK DUO-VENT - C-FLO (48EA/CA)

## (undated) DEVICE — PACK MINOR BASIN - (2EA/CA)

## (undated) DEVICE — SUTURE 2-0 PROLENE SH (36PK/BX)

## (undated) DEVICE — TUBE E-T HI-LO CUFF 7.5MM (10EA/PK)

## (undated) DEVICE — WATER IRRIGATION STERILE 1000ML (12EA/CA)

## (undated) DEVICE — SYRINGE SAFETY 5 ML 18 GA X 1-1/2 BLUNT LL (100/BX 4BX/CA)

## (undated) DEVICE — SYRINGE DISP. 60 CC LL - (30/BX, 12BX/CA)**WHEN THESE ARE GONE ORDER #500206**

## (undated) DEVICE — TUBE E-T HI-LO CUFF 8.0MM (10EA/PK)

## (undated) DEVICE — TUBE E-T HI-LO CUFF 7.0MM (10EA/PK)

## (undated) DEVICE — KIT CUSTOM PROCEDURE SINGLE FOR ENDO  (15/CA)

## (undated) DEVICE — BALLOON RETRIEVAL EXTRACTOR PRO RX   9-12MM

## (undated) DEVICE — GOWN SURGEONS LARGE - (32/CA)

## (undated) DEVICE — DRAPE LAPAROTOMY T SHEET - (12EA/CA)

## (undated) DEVICE — GOWN WARMING STANDARD FLEX - (30/CA)

## (undated) DEVICE — BITE BLOCK ADULT 60FR (100EA/CA)

## (undated) DEVICE — TUBE E-T HI-LO CUFF 8.5MM (10EA/PK)

## (undated) DEVICE — TUBE E-T HI-LO CUFF 6.5MM (10EA/BX)

## (undated) DEVICE — TUBE CONNECT SUCTION CLEAR 120 X 1/4" (50EA/CA)"

## (undated) DEVICE — SODIUM CHL. INJ. 0.9% 500ML (24EA/CA 50CA/PF)

## (undated) DEVICE — GLOVE BIOGEL SZ 8 SURGICAL PF LTX - (50PR/BX 4BX/CA)

## (undated) DEVICE — DRAPE IOBAN II INCISE 23X17 - (10EA/BX 4BX/CA)

## (undated) DEVICE — CANISTER SUCTION 3000ML MECHANICAL FILTER AUTO SHUTOFF MEDI-VAC NONSTERILE LF DISP  (40EA/CA)

## (undated) DEVICE — ELECTRODE 850 FOAM ADHESIVE - HYDROGEL RADIOTRNSPRNT (50/PK)

## (undated) DEVICE — GLOVE BIOGEL PI ORTHO SZ 7.5 PF LF (40PR/BX)

## (undated) DEVICE — SLEEVE, VASO, THIGH, MED

## (undated) DEVICE — MASK, LARYNGEAL AIRWAY #4

## (undated) DEVICE — MASK ANESTHESIA ADULT  - (100/CA)

## (undated) DEVICE — SPONGE GAUZE NON-STERILE 4X4 - (2000/CA 10PK/CA)

## (undated) DEVICE — SUTURE GENERAL

## (undated) DEVICE — GOWN SURGICAL X-LARGE ULTRA - FILM-REINFORCED (20/CA)

## (undated) DEVICE — SET LEADWIRE 5 LEAD BEDSIDE DISPOSABLE ECG (1SET OF 5/EA)

## (undated) DEVICE — BAG SPONGE COUNT 10.25 X 32 - BLUE (250/CA)

## (undated) DEVICE — TOWELS CLOTH SURGICAL - (4/PK 20PK/CA)

## (undated) DEVICE — SUTURE 4-0 MONOCRYL PLUS PS-2 - 27 INCH (36/BX)